# Patient Record
Sex: FEMALE | Race: WHITE | NOT HISPANIC OR LATINO
[De-identification: names, ages, dates, MRNs, and addresses within clinical notes are randomized per-mention and may not be internally consistent; named-entity substitution may affect disease eponyms.]

---

## 2019-01-05 ENCOUNTER — TRANSCRIPTION ENCOUNTER (OUTPATIENT)
Age: 54
End: 2019-01-05

## 2019-01-06 ENCOUNTER — RESULT REVIEW (OUTPATIENT)
Age: 54
End: 2019-01-06

## 2019-01-06 ENCOUNTER — INPATIENT (INPATIENT)
Facility: HOSPITAL | Age: 54
LOS: 4 days | Discharge: HOME | DRG: 329 | End: 2019-01-11
Attending: STUDENT IN AN ORGANIZED HEALTH CARE EDUCATION/TRAINING PROGRAM | Admitting: STUDENT IN AN ORGANIZED HEALTH CARE EDUCATION/TRAINING PROGRAM
Payer: COMMERCIAL

## 2019-01-06 VITALS
WEIGHT: 229.94 LBS | TEMPERATURE: 100 F | OXYGEN SATURATION: 97 % | SYSTOLIC BLOOD PRESSURE: 113 MMHG | HEART RATE: 120 BPM | RESPIRATION RATE: 26 BRPM | HEIGHT: 67 IN | DIASTOLIC BLOOD PRESSURE: 69 MMHG

## 2019-01-06 DIAGNOSIS — K56.609 UNSPECIFIED INTESTINAL OBSTRUCTION, UNSPECIFIED AS TO PARTIAL VERSUS COMPLETE OBSTRUCTION: ICD-10-CM

## 2019-01-06 DIAGNOSIS — K46.0 UNSPECIFIED ABDOMINAL HERNIA WITH OBSTRUCTION, WITHOUT GANGRENE: ICD-10-CM

## 2019-01-06 LAB
ALBUMIN SERPL ELPH-MCNC: 4.4 G/DL — SIGNIFICANT CHANGE UP (ref 3.3–5.2)
ALP SERPL-CCNC: 122 U/L — HIGH (ref 40–120)
ALT FLD-CCNC: 10 U/L — SIGNIFICANT CHANGE UP
ANION GAP SERPL CALC-SCNC: 21 MMOL/L — HIGH (ref 5–17)
APTT BLD: 27.7 SEC — SIGNIFICANT CHANGE UP (ref 27.5–36.3)
AST SERPL-CCNC: 8 U/L — SIGNIFICANT CHANGE UP
BASE EXCESS BLDV CALC-SCNC: -2.2 MMOL/L — LOW (ref -2–2)
BASOPHILS # BLD AUTO: 0 K/UL — SIGNIFICANT CHANGE UP (ref 0–0.2)
BASOPHILS NFR BLD AUTO: 0.1 % — SIGNIFICANT CHANGE UP (ref 0–2)
BILIRUB SERPL-MCNC: 0.7 MG/DL — SIGNIFICANT CHANGE UP (ref 0.4–2)
BUN SERPL-MCNC: 18 MG/DL — SIGNIFICANT CHANGE UP (ref 8–20)
CA-I SERPL-SCNC: 1.09 MMOL/L — LOW (ref 1.15–1.33)
CALCIUM SERPL-MCNC: 9.1 MG/DL — SIGNIFICANT CHANGE UP (ref 8.6–10.2)
CHLORIDE BLDV-SCNC: 99 MMOL/L — SIGNIFICANT CHANGE UP (ref 98–107)
CHLORIDE SERPL-SCNC: 92 MMOL/L — LOW (ref 98–107)
CO2 SERPL-SCNC: 21 MMOL/L — LOW (ref 22–29)
CREAT SERPL-MCNC: 0.66 MG/DL — SIGNIFICANT CHANGE UP (ref 0.5–1.3)
EOSINOPHIL # BLD AUTO: 0 K/UL — SIGNIFICANT CHANGE UP (ref 0–0.5)
EOSINOPHIL NFR BLD AUTO: 0 % — SIGNIFICANT CHANGE UP (ref 0–6)
GAS PNL BLDV: 138 MMOL/L — SIGNIFICANT CHANGE UP (ref 135–145)
GAS PNL BLDV: SIGNIFICANT CHANGE UP
GAS PNL BLDV: SIGNIFICANT CHANGE UP
GLUCOSE BLDV-MCNC: 423 MG/DL — HIGH (ref 70–99)
GLUCOSE SERPL-MCNC: 482 MG/DL — HIGH (ref 70–115)
HCO3 BLDV-SCNC: 22 MMOL/L — SIGNIFICANT CHANGE UP (ref 20–26)
HCT VFR BLD CALC: 43.1 % — SIGNIFICANT CHANGE UP (ref 37–47)
HCT VFR BLDA CALC: 47 — SIGNIFICANT CHANGE UP (ref 39–50)
HGB BLD CALC-MCNC: 15.4 G/DL — SIGNIFICANT CHANGE UP (ref 11.5–15.5)
HGB BLD-MCNC: 14.7 G/DL — SIGNIFICANT CHANGE UP (ref 12–16)
INR BLD: 1.11 RATIO — SIGNIFICANT CHANGE UP (ref 0.88–1.16)
LACTATE BLDV-MCNC: 2.1 MMOL/L — HIGH (ref 0.5–2)
LYMPHOCYTES # BLD AUTO: 0.9 K/UL — LOW (ref 1–4.8)
LYMPHOCYTES # BLD AUTO: 4.2 % — LOW (ref 20–55)
MCHC RBC-ENTMCNC: 31.3 PG — HIGH (ref 27–31)
MCHC RBC-ENTMCNC: 34.1 G/DL — SIGNIFICANT CHANGE UP (ref 32–36)
MCV RBC AUTO: 91.7 FL — SIGNIFICANT CHANGE UP (ref 81–99)
MONOCYTES # BLD AUTO: 1.2 K/UL — HIGH (ref 0–0.8)
MONOCYTES NFR BLD AUTO: 5.5 % — SIGNIFICANT CHANGE UP (ref 3–10)
NEUTROPHILS # BLD AUTO: 19.7 K/UL — HIGH (ref 1.8–8)
NEUTROPHILS NFR BLD AUTO: 89.7 % — HIGH (ref 37–73)
OTHER CELLS CSF MANUAL: 12 ML/DL — LOW (ref 18–22)
PCO2 BLDV: 44 MMHG — SIGNIFICANT CHANGE UP (ref 35–50)
PH BLDV: 7.34 — SIGNIFICANT CHANGE UP (ref 7.32–7.43)
PLATELET # BLD AUTO: 287 K/UL — SIGNIFICANT CHANGE UP (ref 150–400)
PO2 BLDV: 28 MMHG — SIGNIFICANT CHANGE UP (ref 25–45)
POTASSIUM BLDV-SCNC: 4.1 MMOL/L — SIGNIFICANT CHANGE UP (ref 3.4–4.5)
POTASSIUM SERPL-MCNC: 4.3 MMOL/L — SIGNIFICANT CHANGE UP (ref 3.5–5.3)
POTASSIUM SERPL-SCNC: 4.3 MMOL/L — SIGNIFICANT CHANGE UP (ref 3.5–5.3)
PROT SERPL-MCNC: 7.8 G/DL — SIGNIFICANT CHANGE UP (ref 6.6–8.7)
PROTHROM AB SERPL-ACNC: 12.8 SEC — SIGNIFICANT CHANGE UP (ref 10–12.9)
RBC # BLD: 4.7 M/UL — SIGNIFICANT CHANGE UP (ref 4.4–5.2)
RBC # FLD: 12.6 % — SIGNIFICANT CHANGE UP (ref 11–15.6)
SAO2 % BLDV: 59 % — SIGNIFICANT CHANGE UP
SODIUM SERPL-SCNC: 134 MMOL/L — LOW (ref 135–145)
TYPE + AB SCN PNL BLD: SIGNIFICANT CHANGE UP
WBC # BLD: 22 K/UL — HIGH (ref 4.8–10.8)
WBC # FLD AUTO: 22 K/UL — HIGH (ref 4.8–10.8)

## 2019-01-06 PROCEDURE — 44140 PARTIAL REMOVAL OF COLON: CPT

## 2019-01-06 PROCEDURE — 88307 TISSUE EXAM BY PATHOLOGIST: CPT | Mod: 26

## 2019-01-06 PROCEDURE — 99291 CRITICAL CARE FIRST HOUR: CPT

## 2019-01-06 PROCEDURE — 93010 ELECTROCARDIOGRAM REPORT: CPT

## 2019-01-06 PROCEDURE — 49561: CPT | Mod: 59

## 2019-01-06 PROCEDURE — 49568: CPT

## 2019-01-06 PROCEDURE — 71045 X-RAY EXAM CHEST 1 VIEW: CPT | Mod: 26

## 2019-01-06 PROCEDURE — 74177 CT ABD & PELVIS W/CONTRAST: CPT | Mod: 26

## 2019-01-06 PROCEDURE — 93010 ELECTROCARDIOGRAM REPORT: CPT | Mod: 77

## 2019-01-06 PROCEDURE — 71045 X-RAY EXAM CHEST 1 VIEW: CPT | Mod: 26,77

## 2019-01-06 RX ORDER — LISINOPRIL 2.5 MG/1
10 TABLET ORAL DAILY
Qty: 0 | Refills: 0 | Status: DISCONTINUED | OUTPATIENT
Start: 2019-01-06 | End: 2019-01-07

## 2019-01-06 RX ORDER — INSULIN LISPRO 100/ML
VIAL (ML) SUBCUTANEOUS
Qty: 0 | Refills: 0 | Status: DISCONTINUED | OUTPATIENT
Start: 2019-01-06 | End: 2019-01-07

## 2019-01-06 RX ORDER — PIPERACILLIN AND TAZOBACTAM 4; .5 G/20ML; G/20ML
3.38 INJECTION, POWDER, LYOPHILIZED, FOR SOLUTION INTRAVENOUS ONCE
Qty: 0 | Refills: 0 | Status: COMPLETED | OUTPATIENT
Start: 2019-01-06 | End: 2019-01-06

## 2019-01-06 RX ORDER — PIPERACILLIN AND TAZOBACTAM 4; .5 G/20ML; G/20ML
3.38 INJECTION, POWDER, LYOPHILIZED, FOR SOLUTION INTRAVENOUS EVERY 8 HOURS
Qty: 0 | Refills: 0 | Status: COMPLETED | OUTPATIENT
Start: 2019-01-06 | End: 2019-01-09

## 2019-01-06 RX ORDER — ATORVASTATIN CALCIUM 80 MG/1
20 TABLET, FILM COATED ORAL AT BEDTIME
Qty: 0 | Refills: 0 | Status: DISCONTINUED | OUTPATIENT
Start: 2019-01-06 | End: 2019-01-07

## 2019-01-06 RX ORDER — SODIUM CHLORIDE 9 MG/ML
3200 INJECTION INTRAMUSCULAR; INTRAVENOUS; SUBCUTANEOUS ONCE
Qty: 0 | Refills: 0 | Status: COMPLETED | OUTPATIENT
Start: 2019-01-06 | End: 2019-01-06

## 2019-01-06 RX ORDER — DEXTROSE 50 % IN WATER 50 %
12.5 SYRINGE (ML) INTRAVENOUS ONCE
Qty: 0 | Refills: 0 | Status: DISCONTINUED | OUTPATIENT
Start: 2019-01-06 | End: 2019-01-11

## 2019-01-06 RX ORDER — GLUCAGON INJECTION, SOLUTION 0.5 MG/.1ML
1 INJECTION, SOLUTION SUBCUTANEOUS ONCE
Qty: 0 | Refills: 0 | Status: DISCONTINUED | OUTPATIENT
Start: 2019-01-06 | End: 2019-01-11

## 2019-01-06 RX ORDER — MORPHINE SULFATE 50 MG/1
4 CAPSULE, EXTENDED RELEASE ORAL ONCE
Qty: 0 | Refills: 0 | Status: DISCONTINUED | OUTPATIENT
Start: 2019-01-06 | End: 2019-01-06

## 2019-01-06 RX ORDER — SODIUM CHLORIDE 9 MG/ML
1000 INJECTION, SOLUTION INTRAVENOUS
Qty: 0 | Refills: 0 | Status: DISCONTINUED | OUTPATIENT
Start: 2019-01-06 | End: 2019-01-08

## 2019-01-06 RX ORDER — HYDROCHLOROTHIAZIDE 25 MG
12.5 TABLET ORAL DAILY
Qty: 0 | Refills: 0 | Status: DISCONTINUED | OUTPATIENT
Start: 2019-01-06 | End: 2019-01-07

## 2019-01-06 RX ORDER — INSULIN HUMAN 100 [IU]/ML
10 INJECTION, SOLUTION SUBCUTANEOUS ONCE
Qty: 0 | Refills: 0 | Status: COMPLETED | OUTPATIENT
Start: 2019-01-06 | End: 2019-01-06

## 2019-01-06 RX ORDER — DEXTROSE 50 % IN WATER 50 %
25 SYRINGE (ML) INTRAVENOUS ONCE
Qty: 0 | Refills: 0 | Status: DISCONTINUED | OUTPATIENT
Start: 2019-01-06 | End: 2019-01-11

## 2019-01-06 RX ORDER — ENOXAPARIN SODIUM 100 MG/ML
40 INJECTION SUBCUTANEOUS DAILY
Qty: 0 | Refills: 0 | Status: DISCONTINUED | OUTPATIENT
Start: 2019-01-06 | End: 2019-01-11

## 2019-01-06 RX ORDER — DEXTROSE 50 % IN WATER 50 %
15 SYRINGE (ML) INTRAVENOUS ONCE
Qty: 0 | Refills: 0 | Status: DISCONTINUED | OUTPATIENT
Start: 2019-01-06 | End: 2019-01-11

## 2019-01-06 RX ORDER — ACETAMINOPHEN 500 MG
650 TABLET ORAL ONCE
Qty: 0 | Refills: 0 | Status: COMPLETED | OUTPATIENT
Start: 2019-01-06 | End: 2019-01-06

## 2019-01-06 RX ORDER — SODIUM CHLORIDE 9 MG/ML
1000 INJECTION, SOLUTION INTRAVENOUS
Qty: 0 | Refills: 0 | Status: DISCONTINUED | OUTPATIENT
Start: 2019-01-06 | End: 2019-01-11

## 2019-01-06 RX ORDER — ONDANSETRON 8 MG/1
4 TABLET, FILM COATED ORAL ONCE
Qty: 0 | Refills: 0 | Status: COMPLETED | OUTPATIENT
Start: 2019-01-06 | End: 2019-01-06

## 2019-01-06 RX ADMIN — MORPHINE SULFATE 4 MILLIGRAM(S): 50 CAPSULE, EXTENDED RELEASE ORAL at 14:43

## 2019-01-06 RX ADMIN — SODIUM CHLORIDE 125 MILLILITER(S): 9 INJECTION, SOLUTION INTRAVENOUS at 19:35

## 2019-01-06 RX ADMIN — Medication 650 MILLIGRAM(S): at 10:43

## 2019-01-06 RX ADMIN — MORPHINE SULFATE 4 MILLIGRAM(S): 50 CAPSULE, EXTENDED RELEASE ORAL at 11:57

## 2019-01-06 RX ADMIN — Medication 650 MILLIGRAM(S): at 12:45

## 2019-01-06 RX ADMIN — SODIUM CHLORIDE 3200 MILLILITER(S): 9 INJECTION INTRAMUSCULAR; INTRAVENOUS; SUBCUTANEOUS at 10:26

## 2019-01-06 RX ADMIN — ONDANSETRON 4 MILLIGRAM(S): 8 TABLET, FILM COATED ORAL at 10:27

## 2019-01-06 RX ADMIN — SODIUM CHLORIDE 3200 MILLILITER(S): 9 INJECTION INTRAMUSCULAR; INTRAVENOUS; SUBCUTANEOUS at 11:54

## 2019-01-06 RX ADMIN — MORPHINE SULFATE 4 MILLIGRAM(S): 50 CAPSULE, EXTENDED RELEASE ORAL at 10:27

## 2019-01-06 RX ADMIN — PIPERACILLIN AND TAZOBACTAM 200 GRAM(S): 4; .5 INJECTION, POWDER, LYOPHILIZED, FOR SOLUTION INTRAVENOUS at 10:27

## 2019-01-06 RX ADMIN — PIPERACILLIN AND TAZOBACTAM 3.38 GRAM(S): 4; .5 INJECTION, POWDER, LYOPHILIZED, FOR SOLUTION INTRAVENOUS at 11:54

## 2019-01-06 RX ADMIN — INSULIN HUMAN 10 UNIT(S): 100 INJECTION, SOLUTION SUBCUTANEOUS at 11:57

## 2019-01-06 RX ADMIN — MORPHINE SULFATE 4 MILLIGRAM(S): 50 CAPSULE, EXTENDED RELEASE ORAL at 11:45

## 2019-01-06 NOTE — ED PROVIDER NOTE - PHYSICAL EXAMINATION
Const: Awake, alert and oriented. Appears uncomfortable. Smells heavily of cigarettes.  HEENT: NC/AT. Dry mucous membranes.  Eyes: No scleral icterus. EOMI.  Neck:. Soft and supple. Full ROM without pain.  Cardiac: Tachycardic rate and regular rhythm. +S1/S2. No murmurs. Peripheral pulses 2+ and symmetric. No LE edema.  Resp: Speaking in full sentences. No evidence of respiratory distress. No wheezes, rales or rhonchi.  Abd: Soft, large 15 cm midline abdominal distension which is firm, extremely tender, absent bowel sounds within hernia, and non-reducible. Decreased bowel sounds in all other quadrants.   Back: Spine midline and non-tender. No CVAT.  Skin: No rashes, abrasions or lacerations.  Neuro: Awake, alert & oriented x 3. Moves all extremities symmetrically.

## 2019-01-06 NOTE — ED ADULT NURSE REASSESSMENT NOTE - NS ED NURSE REASSESS COMMENT FT1
Assumed care of patient at 1930, alert and oriented x4, resting in bed. IVS patient. Family at bedside, in NAD distress at this time.  NG tube to right nare to low wall suction noted. Chan catheter to straight drainage draining clear mehdi urine noted. Pt currently awaiting OR. Awaiting RN from OR to call for Report. pt educated on plan of care, pt able to successfully teach back plan of care to RN, RN will continue to reeducate pt during hospital stay.

## 2019-01-06 NOTE — H&P ADULT - NSHPPHYSICALEXAM_GEN_ALL_CORE
Constitutional: Patient resting comfortably in bed in no acute distress   HEENT: NG tube in place with 100cc gastric contents   Neck: No JVD, full ROM w/o pain   Respiratory: CTAB with unlabored respirations, no accessory muscle use or conversational dyspnea   Cardiovascular: Regular rate and rhythm with no arrythmias or murmurs  Gastrointestinal: No surgical incisions on inspection, large non reducible abdominal wall hernia likely umbilical in origin with no overlying skin changes but tender to palpation and firm to touch otherwise rest of abdomen soft, non-tender in other quadrants, non-distended with no rebound tenderness or guarding   : Chan in place with 300 mehdi colored in present   Neurological: GCS 15 ([id number]) with no gross sensory, motor or coordination deficits   Psychiatric: Normal mood and affect   Musculoskeletal: No joint pain, swelling or deformity with no limitation of movement

## 2019-01-06 NOTE — ED ADULT NURSE NOTE - OBJECTIVE STATEMENT
pt stated she has had an abd hernia and 10/10 she has been coughing which has made it worse.  pt also hasn't had a bm since friday. pt is febrile

## 2019-01-06 NOTE — ED PROVIDER NOTE - NS ED ROS FT
Const: Denies fever, chills  HEENT: Denies blurry vision, sore throat  Neck: Denies neck pain/stiffness  Resp: + coughing. Denies SOB  Cardiovascular: Denies CP, palpitations, LE edema  GI: + nausea, + vomiting, + abdominal pain, + constipation. Denies diarrhea, blood in stool  : Denies urinary frequency/urgency/dysuria, hematuria  MSK: Denies back pain  Neuro: Denies HA, dizziness, numbness, weakness  Skin: Denies rashes.

## 2019-01-06 NOTE — ED PROVIDER NOTE - MEDICAL DECISION MAKING DETAILS
Patient presents complaining of worsening abdominal pain, nausea, vomiting and now inability to pass stool or flatus with large, non-reducible, very tender midline abdominal hernia. No history of abdominal surgeries. I attempted to reduce hernia, but patient could not tolerate. Code Sepsis called, patient treated with 30 cc/kg fluid bolus, zosyn to cover intra-abdominal infections, tylenol for fever and will CT A/P to evaluate obstruction. Morphine given for pain and zofran for nausea.

## 2019-01-06 NOTE — ED PROVIDER NOTE - PROGRESS NOTE DETAILS
I shared results with patient and family who is at bedside. Patient is taking small sips of water and has not vomited since she has been here. She is still not passing gas. I explained plan for NG tube for decompression. She continues to complain of pain. Will give another 4 mg of morphine. Surgery to see.

## 2019-01-06 NOTE — ED PROVIDER NOTE - OBJECTIVE STATEMENT
Patient with no significant PMH presents complaining of severe abdominal pain, nausea, vomiting and inability to pass flatus for the past 4 days which started after coughing. She has had a large abdominal hernia since her last pregnancy many years ago, but 4 days ago she coughed and the hernia suddenly became enlarged and hard. She states at that time she became nauseas and had multiple episodes of vomiting afterwards. She has had some bowel movements but notes they are not normal in that they have been small and very soft, and today she was unable to pass stool or flatus. She has not taken any medication for pain. She denies fevers at home, chest pain, sore throat, SOB, urinary symptoms. She has been experiencing a dry cough. She denies any history of abdominal surgeries. Patient with no significant PMH presents complaining of severe abdominal pain, nausea, vomiting and inability to pass flatus for the past 4 days which started after coughing. She has had a large abdominal hernia since her last pregnancy many years ago, but 4 days ago she coughed and the hernia suddenly became enlarged and hard. She states at that time she became nauseas and had multiple episodes of vomiting afterwards. She has had some bowel movements but notes they are not normal in that they have been small and very soft, and today she was unable to pass stool or flatus. She has not taken any medication for pain. She denies fevers at home, chest pain, sore throat, SOB, urinary symptoms. She has been experiencing a dry cough. She denies any history of abdominal surgeries. She has not taken any of her medications in the past 4 days due to nausea and vomiting.  PMD: [place]

## 2019-01-06 NOTE — H&P ADULT - HISTORY OF PRESENT ILLNESS
53 year old female presenting with abdominal pain x 4 days. ACS/Trauma consulted to evaluate patient's hernia. Patient seen and examined at bedside with on call surgery attending. Per patient report, she has had this hernia for the past 20 years and has always been asymptomatic (thus had no plans for any operative repair) was reducible when it was smaller but has not been reducible for the past couple of years. Also states that the hernia has always been soft on palpation but she noticed contents getting hard/firm 4 days ago. Describes abdominal pain as located in the hernia as dull, intermittent non radiating pain that progressively got worse prompting her to come to the ED 1/6/19. Abdominal pain was associated with 2 episodes of bilious emesis over past 4 days. Poor PO intake secondary to nausea, vomiting and poor appetite (last meal Friday) Continues to have small BMs (last stool yesterday 1/5) and denies passing much flatus. Denies any surgical history and suspects that her hernia was secondary to diastasis rectus in the past versus umbilical hernia that worsened when she was pregnant.     PMHx: DM, HLD, HTN, TIAs in the past   PSHx: None   Allergies: Biaxin   Meds: Atorvastatin 20mg, Lisinopril-HCTZ 10/12.5mg, metformin 500mg BID, glipizide ER 10mg, gemfibrozil 600mg   FamHx: Non contributory   SocHx: Current smoker (1pack/day x >20yrs) denies EtOH and illicit drug use 53 year old female presenting with abdominal pain x 4 days. ACS/Trauma consulted to evaluate patient's hernia. Patient seen and examined at bedside with on call surgery attending. Per patient report, she has had this hernia for the past 20 years and has always been asymptomatic (thus had no plans for any operative repair) was reducible when it was smaller but has not been reducible for the past couple of years. Also states that the hernia has always been soft on palpation but she noticed contents getting hard/firm 4 days ago.     Describes abdominal pain as located in the hernia as dull, intermittent non radiating pain that progressively got worse prompting her to come to the ED 1/6/19. Abdominal pain was associated with 2 episodes of bilious emesis over past 4 days. Poor PO intake secondary to nausea, vomiting and poor appetite (last meal Friday) Continues to have small BMs (last stool yesterday 1/5) and denies passing much flatus. Denies any surgical history and suspects that her hernia was secondary to diastasis rectus in the past versus umbilical hernia that worsened when she was pregnant.     PMHx: DM, HLD, HTN, TIAs in the past   PSHx: None   Allergies: Biaxin   Meds: Atorvastatin 20mg, Lisinopril-HCTZ 10/12.5mg, metformin 500mg BID, glipizide ER 10mg, gemfibrozil 600mg   FamHx: Non contributory   SocHx: Current smoker (1pack/day x >20yrs) denies EtOH and illicit drug use

## 2019-01-06 NOTE — H&P ADULT - ASSESSMENT
53 year old female with incarcerated ventral wall hernia requiring operative intervention by ACS/Trauma team.

## 2019-01-06 NOTE — H&P ADULT - PROBLEM SELECTOR PLAN 1
1. Admit to ACS/Trauma under Dr. Collins   2. NPO, IVFs and IV ABx (zosyn) started   3. Pre-op labs ordered 1. Admit to ACS/Trauma under Dr. Collins   2. NPO, IVFs and IV ABx (zosyn) started   3. Pre-op labs ordered (type and screen, serum pregnancy test etc)   4. OR 1/6/18  5. Restart home medications 1. Admit to ACS/Trauma under Dr. Collins   2. NPO, IVFs and IV ABx (zosyn) started   3. Pre-op labs ordered (type and screen, serum pregnancy test etc)   4. Restart home medications post operatively   5. Routine DVT PPx

## 2019-01-07 LAB
ANION GAP SERPL CALC-SCNC: 10 MMOL/L — SIGNIFICANT CHANGE UP (ref 5–17)
BASOPHILS # BLD AUTO: 0 K/UL — SIGNIFICANT CHANGE UP (ref 0–0.2)
BASOPHILS NFR BLD AUTO: 0.1 % — SIGNIFICANT CHANGE UP (ref 0–2)
BUN SERPL-MCNC: 20 MG/DL — SIGNIFICANT CHANGE UP (ref 8–20)
CALCIUM SERPL-MCNC: 7.7 MG/DL — LOW (ref 8.6–10.2)
CHLORIDE SERPL-SCNC: 102 MMOL/L — SIGNIFICANT CHANGE UP (ref 98–107)
CO2 SERPL-SCNC: 23 MMOL/L — SIGNIFICANT CHANGE UP (ref 22–29)
CREAT SERPL-MCNC: 0.63 MG/DL — SIGNIFICANT CHANGE UP (ref 0.5–1.3)
EOSINOPHIL # BLD AUTO: 0 K/UL — SIGNIFICANT CHANGE UP (ref 0–0.5)
EOSINOPHIL NFR BLD AUTO: 0.1 % — SIGNIFICANT CHANGE UP (ref 0–6)
GAS PNL BLDA: SIGNIFICANT CHANGE UP
GAS PNL BLDA: SIGNIFICANT CHANGE UP
GLUCOSE BLDC GLUCOMTR-MCNC: 126 MG/DL — HIGH (ref 70–99)
GLUCOSE BLDC GLUCOMTR-MCNC: 156 MG/DL — HIGH (ref 70–99)
GLUCOSE BLDC GLUCOMTR-MCNC: 181 MG/DL — HIGH (ref 70–99)
GLUCOSE BLDC GLUCOMTR-MCNC: 183 MG/DL — HIGH (ref 70–99)
GLUCOSE BLDC GLUCOMTR-MCNC: 197 MG/DL — HIGH (ref 70–99)
GLUCOSE BLDC GLUCOMTR-MCNC: 207 MG/DL — HIGH (ref 70–99)
GLUCOSE BLDC GLUCOMTR-MCNC: 219 MG/DL — HIGH (ref 70–99)
GLUCOSE BLDC GLUCOMTR-MCNC: 235 MG/DL — HIGH (ref 70–99)
GLUCOSE BLDC GLUCOMTR-MCNC: 275 MG/DL — HIGH (ref 70–99)
GLUCOSE BLDC GLUCOMTR-MCNC: 284 MG/DL — HIGH (ref 70–99)
GLUCOSE BLDC GLUCOMTR-MCNC: 298 MG/DL — HIGH (ref 70–99)
GLUCOSE BLDC GLUCOMTR-MCNC: 299 MG/DL — HIGH (ref 70–99)
GLUCOSE BLDC GLUCOMTR-MCNC: 325 MG/DL — HIGH (ref 70–99)
GLUCOSE BLDC GLUCOMTR-MCNC: 361 MG/DL — HIGH (ref 70–99)
GLUCOSE SERPL-MCNC: 318 MG/DL — HIGH (ref 70–115)
HCT VFR BLD CALC: 36.5 % — LOW (ref 37–47)
HGB BLD-MCNC: 12.2 G/DL — SIGNIFICANT CHANGE UP (ref 12–16)
LYMPHOCYTES # BLD AUTO: 1.1 K/UL — SIGNIFICANT CHANGE UP (ref 1–4.8)
LYMPHOCYTES # BLD AUTO: 7.1 % — LOW (ref 20–55)
MAGNESIUM SERPL-MCNC: 2.1 MG/DL — SIGNIFICANT CHANGE UP (ref 1.6–2.6)
MCHC RBC-ENTMCNC: 30.8 PG — SIGNIFICANT CHANGE UP (ref 27–31)
MCHC RBC-ENTMCNC: 33.4 G/DL — SIGNIFICANT CHANGE UP (ref 32–36)
MCV RBC AUTO: 92.2 FL — SIGNIFICANT CHANGE UP (ref 81–99)
MONOCYTES # BLD AUTO: 0.8 K/UL — SIGNIFICANT CHANGE UP (ref 0–0.8)
MONOCYTES NFR BLD AUTO: 5.3 % — SIGNIFICANT CHANGE UP (ref 3–10)
NEUTROPHILS # BLD AUTO: 13.8 K/UL — HIGH (ref 1.8–8)
NEUTROPHILS NFR BLD AUTO: 87 % — HIGH (ref 37–73)
PHOSPHATE SERPL-MCNC: 3.1 MG/DL — SIGNIFICANT CHANGE UP (ref 2.4–4.7)
PLATELET # BLD AUTO: 217 K/UL — SIGNIFICANT CHANGE UP (ref 150–400)
POTASSIUM SERPL-MCNC: 4 MMOL/L — SIGNIFICANT CHANGE UP (ref 3.5–5.3)
POTASSIUM SERPL-SCNC: 4 MMOL/L — SIGNIFICANT CHANGE UP (ref 3.5–5.3)
RBC # BLD: 3.96 M/UL — LOW (ref 4.4–5.2)
RBC # FLD: 12.7 % — SIGNIFICANT CHANGE UP (ref 11–15.6)
SODIUM SERPL-SCNC: 135 MMOL/L — SIGNIFICANT CHANGE UP (ref 135–145)
WBC # BLD: 15.9 K/UL — HIGH (ref 4.8–10.8)
WBC # FLD AUTO: 15.9 K/UL — HIGH (ref 4.8–10.8)

## 2019-01-07 PROCEDURE — 71045 X-RAY EXAM CHEST 1 VIEW: CPT | Mod: 26

## 2019-01-07 PROCEDURE — 74018 RADEX ABDOMEN 1 VIEW: CPT | Mod: 26

## 2019-01-07 RX ORDER — METOCLOPRAMIDE HCL 10 MG
10 TABLET ORAL ONCE
Qty: 0 | Refills: 0 | Status: COMPLETED | OUTPATIENT
Start: 2019-01-07 | End: 2019-01-07

## 2019-01-07 RX ORDER — ACETAMINOPHEN 500 MG
1000 TABLET ORAL ONCE
Qty: 0 | Refills: 0 | Status: COMPLETED | OUTPATIENT
Start: 2019-01-07 | End: 2019-01-07

## 2019-01-07 RX ORDER — INSULIN LISPRO 100/ML
VIAL (ML) SUBCUTANEOUS
Qty: 0 | Refills: 0 | Status: DISCONTINUED | OUTPATIENT
Start: 2019-01-07 | End: 2019-01-07

## 2019-01-07 RX ORDER — INSULIN LISPRO 100/ML
VIAL (ML) SUBCUTANEOUS EVERY 6 HOURS
Qty: 0 | Refills: 0 | Status: DISCONTINUED | OUTPATIENT
Start: 2019-01-07 | End: 2019-01-08

## 2019-01-07 RX ORDER — PROPOFOL 10 MG/ML
5 INJECTION, EMULSION INTRAVENOUS
Qty: 1000 | Refills: 0 | Status: DISCONTINUED | OUTPATIENT
Start: 2019-01-07 | End: 2019-01-07

## 2019-01-07 RX ORDER — PROPOFOL 10 MG/ML
10 INJECTION, EMULSION INTRAVENOUS ONCE
Qty: 0 | Refills: 0 | Status: COMPLETED | OUTPATIENT
Start: 2019-01-07 | End: 2019-01-07

## 2019-01-07 RX ORDER — INSULIN HUMAN 100 [IU]/ML
4 INJECTION, SOLUTION SUBCUTANEOUS
Qty: 50 | Refills: 0 | Status: DISCONTINUED | OUTPATIENT
Start: 2019-01-07 | End: 2019-01-07

## 2019-01-07 RX ORDER — FENTANYL CITRATE 50 UG/ML
50 INJECTION INTRAVENOUS ONCE
Qty: 0 | Refills: 0 | Status: DISCONTINUED | OUTPATIENT
Start: 2019-01-07 | End: 2019-01-08

## 2019-01-07 RX ORDER — ONDANSETRON 8 MG/1
4 TABLET, FILM COATED ORAL ONCE
Qty: 0 | Refills: 0 | Status: COMPLETED | OUTPATIENT
Start: 2019-01-07 | End: 2019-01-07

## 2019-01-07 RX ORDER — FENTANYL CITRATE 50 UG/ML
0.5 INJECTION INTRAVENOUS
Qty: 2500 | Refills: 0 | Status: DISCONTINUED | OUTPATIENT
Start: 2019-01-07 | End: 2019-01-07

## 2019-01-07 RX ORDER — PROPOFOL 10 MG/ML
25 INJECTION, EMULSION INTRAVENOUS
Qty: 1000 | Refills: 0 | Status: DISCONTINUED | OUTPATIENT
Start: 2019-01-07 | End: 2019-01-07

## 2019-01-07 RX ORDER — SODIUM CHLORIDE 9 MG/ML
1000 INJECTION, SOLUTION INTRAVENOUS ONCE
Qty: 0 | Refills: 0 | Status: COMPLETED | OUTPATIENT
Start: 2019-01-07 | End: 2019-01-07

## 2019-01-07 RX ADMIN — INSULIN HUMAN 4 UNIT(S)/HR: 100 INJECTION, SOLUTION SUBCUTANEOUS at 02:29

## 2019-01-07 RX ADMIN — PROPOFOL 3.13 MICROGRAM(S)/KG/MIN: 10 INJECTION, EMULSION INTRAVENOUS at 07:05

## 2019-01-07 RX ADMIN — SODIUM CHLORIDE 125 MILLILITER(S): 9 INJECTION, SOLUTION INTRAVENOUS at 07:07

## 2019-01-07 RX ADMIN — Medication 1000 MILLIGRAM(S): at 17:23

## 2019-01-07 RX ADMIN — Medication 400 MILLIGRAM(S): at 10:50

## 2019-01-07 RX ADMIN — PIPERACILLIN AND TAZOBACTAM 25 GRAM(S): 4; .5 INJECTION, POWDER, LYOPHILIZED, FOR SOLUTION INTRAVENOUS at 22:12

## 2019-01-07 RX ADMIN — PIPERACILLIN AND TAZOBACTAM 25 GRAM(S): 4; .5 INJECTION, POWDER, LYOPHILIZED, FOR SOLUTION INTRAVENOUS at 13:24

## 2019-01-07 RX ADMIN — PIPERACILLIN AND TAZOBACTAM 25 GRAM(S): 4; .5 INJECTION, POWDER, LYOPHILIZED, FOR SOLUTION INTRAVENOUS at 07:06

## 2019-01-07 RX ADMIN — FENTANYL CITRATE 5.21 MICROGRAM(S)/KG/HR: 50 INJECTION INTRAVENOUS at 02:30

## 2019-01-07 RX ADMIN — SODIUM CHLORIDE 1000 MILLILITER(S): 9 INJECTION, SOLUTION INTRAVENOUS at 02:30

## 2019-01-07 RX ADMIN — Medication 10 MILLIGRAM(S): at 12:30

## 2019-01-07 RX ADMIN — ONDANSETRON 4 MILLIGRAM(S): 8 TABLET, FILM COATED ORAL at 10:40

## 2019-01-07 RX ADMIN — ENOXAPARIN SODIUM 40 MILLIGRAM(S): 100 INJECTION SUBCUTANEOUS at 11:26

## 2019-01-07 RX ADMIN — Medication 4: at 11:26

## 2019-01-07 RX ADMIN — Medication 400 MILLIGRAM(S): at 17:22

## 2019-01-07 RX ADMIN — ONDANSETRON 4 MILLIGRAM(S): 8 TABLET, FILM COATED ORAL at 12:00

## 2019-01-07 RX ADMIN — PROPOFOL 3.13 MICROGRAM(S)/KG/MIN: 10 INJECTION, EMULSION INTRAVENOUS at 02:30

## 2019-01-07 RX ADMIN — Medication 1000 MILLIGRAM(S): at 10:51

## 2019-01-07 RX ADMIN — Medication 4: at 17:22

## 2019-01-07 NOTE — BRIEF OPERATIVE NOTE - PROCEDURE
<<-----Click on this checkbox to enter Procedure Ventral hernia repair with mesh  01/07/2019    Active  CSULZBACK  Right hemicolectomy with anastomosis of small intestine to transverse colon  01/07/2019    Active  CSULZBACK  Exploratory laparotomy  01/07/2019    Active  CSULZBACK

## 2019-01-07 NOTE — BRIEF OPERATIVE NOTE - OPERATION/FINDINGS
ischemia of right colon with nonfixation, infarcted omentum which was resected  small bowel viable  right colectomy performed with primary side to side stapled anastomosis  hernia sac excised  subcutaneous skin flaps raised allowing fascia to be brought together in midline  fascia closed primary in midline  biologic onlay mesh placed over fascial closure  two JOSE CARLOS drains placed over mesh exiting in the R and L lower quadrants, RLQ drain runs along inferior aspect of wound and courses along L gutter, LLQ drain runs along inferior aspect of wound and courses along R gutter  skin closed with staples and dry dressing placed, abdominal binder placed in OR

## 2019-01-07 NOTE — PROGRESS NOTE ADULT - ASSESSMENT
A/p: 53yF s/p strangulated ventral hernia, now s/p right hemicolectomy w/ primary ileocolic anastomosis w/ onlay biological mesh    Neuro: Pain adequately controlled, transitioned to oral pain regimen    Card: Continue hemodynamic monitoring    Pulm: Pulmonary toileting out of bed to chair, IS    GI: NPO, will advance as tolerated     : Chan in place, strict i/o's     Endo: ISS, will transition to oral meds     Hem/DVT Lovenox    ID: Zosyn -likely d/c     Lines: PIV    Dispo: SICU A/p: 53yF s/p strangulated ventral hernia, now s/p right hemicolectomy w/ primary ileocolic anastomosis w/ onlay biological mesh    Neuro: Pain adequately controlled, transitioned to oral pain regimen    Card: Continue hemodynamic monitoring    Pulm: Pulmonary toileting out of bed to chair, IS    GI: NPO, will advance as tolerated     : Chan in place, strict i/o's     Endo: ISS, will transition to oral meds     Hem/DVT Lovenox    ID: Zosyn for 4 days    Lines: PIV    Dispo: SICU

## 2019-01-07 NOTE — PROGRESS NOTE ADULT - SUBJECTIVE AND OBJECTIVE BOX
HISTORY  53y Female    24 HOUR EVENTS: Pt doing well this morning, extubated.  Pt states pain is well controlled.  Pt complaining of nausea, given zofran x2. Not passing gas currently, no BM. Pt states nausea is controlled.  Continues on insulin gtt, transitioned to ISS.  JOSE CARLOS's x2 above mesh draining ~50/cc per shift.    SUBJECTIVE/ROS:  [x ] A ten-point review of systems was otherwise negative except as noted.  [ ] Due to altered mental status/intubation, subjective information were not able to be obtained from the patient. History was obtained, to the extent possible, from review of the chart and collateral sources of information.      NEURO  RASS: 0     GCS:  15   CAM ICU: neg  Exam: No focal neuro deficits, RITTER  Meds: acetaminophen  IVPB .. 1000 milliGRAM(s) IV Intermittent once  fentaNYL    Injectable 50 MICROGram(s) IV Push once    [x] Adequacy of sedation and pain control has been assessed and adjusted      RESPIRATORY  RR: 28 (01-07-19 @ 10:00) (18 - 36)  SpO2: 94% (01-07-19 @ 10:00) (89% - 97%)  Wt(kg): --  Exam: unlabored, clear to auscultation bilaterally  Mechanical Ventilation: Mode: CPAP with PS, RR (patient): 29, FiO2: 50, PEEP: 5, PS: 5, MAP: 7.6  ABG - ( 07 Jan 2019 10:01 )  pH: 7.42  /  pCO2: 39    /  pO2: 65    / HCO3: 25    / Base Excess: 0.7   /  SaO2: 95      Lactate: x                [ ] Extubation Readiness Assessed  Meds:       CARDIOVASCULAR  HR: 105 (01-07-19 @ 10:00) (94 - 120)  BP: 127/68 (01-07-19 @ 10:00) (91/52 - 136/63)  BP(mean): 92 (01-07-19 @ 10:00) (66 - 92)  ABP: --  ABP(mean): --  Wt(kg): --  CVP(cm H2O): --  VBG - ( 06 Jan 2019 10:25 )  pH: 7.34  /  pCO2: 44    /  pO2: 28    / HCO3: 22    / Base Excess: -2.2  /  SaO2: 59     Lactate: 2.1                Exam: RRR  Cardiac Rhythm: NSR  Perfusion     [ x]Adequate   [ ]Inadequate  Mentation   [x ]Normal       [ ]Reduced  Extremities  [x ]Warm         [ ]Cool  Volume Status [ ]Hypervolemic [x ]Euvolemic [ ]Hypovolemic  Meds:       GI/NUTRITION  Exam: soft, ttp over incision sites, nondistended, wet to dry in place  Diet: NPO  Meds:     GENITOURINARY  I&O's Detail    01-06 @ 07:01  -  01-07 @ 07:00  --------------------------------------------------------  IN:    fentaNYL Infusion.: 31.8 mL    insulin Infusion: 28 mL    Lactated Ringers IV Bolus: 2000 mL    lactated ringers.: 1125 mL    propofol Infusion: 57.2 mL  Total IN: 3242 mL    OUT:    Bulb: 20 mL    Bulb: 50 mL    Indwelling Catheter - Urethral: 750 mL    Nasoenteral Tube: 300 mL  Total OUT: 1120 mL    Total NET: 2122 mL      01-07 @ 07:01 - 01-07 @ 13:55  --------------------------------------------------------  IN:  Total IN: 0 mL    OUT:    Indwelling Catheter - Urethral: 235 mL  Total OUT: 235 mL    Total NET: -235 mL        Weight (kg): 104.3 (01-06 @ 17:32)  01-07    135  |  102  |  20.0  ----------------------------<  318<H>  4.0   |  23.0  |  0.63    Ca    7.7<L>      07 Jan 2019 02:19  Phos  3.1     01-07  Mg     2.1     01-07    TPro  7.8  /  Alb  4.4  /  TBili  0.7  /  DBili  x   /  AST  8   /  ALT  10  /  AlkPhos  122<H>  01-06    [x ] Chan catheter, indication: critically ill   Meds: dextrose 5%. 1000 milliLiter(s) IV Continuous <Continuous>  lactated ringers. 1000 milliLiter(s) IV Continuous <Continuous>        HEMATOLOGIC  Meds: enoxaparin Injectable 40 milliGRAM(s) SubCutaneous daily    [x] VTE Prophylaxis                        12.2   15.9  )-----------( 217      ( 07 Jan 2019 02:19 )             36.5     PT/INR - ( 06 Jan 2019 10:25 )   PT: 12.8 sec;   INR: 1.11 ratio         PTT - ( 06 Jan 2019 10:25 )  PTT:27.7 sec  Transfusion     [ ] PRBC   [ ] Platelets   [ ] FFP   [ ] Cryoprecipitate      INFECTIOUS DISEASES  T(C): 36.6 (01-07-19 @ 12:00), Max: 37.6 (01-07-19 @ 04:00)  Wt(kg): --  WBC Count: 15.9 K/uL (01-07 @ 02:19)    Recent Cultures:    Meds: piperacillin/tazobactam IVPB. 3.375 Gram(s) IV Intermittent every 8 hours        ENDOCRINE  Capillary Blood Glucose    Meds: dextrose 40% Gel 15 Gram(s) Oral once PRN  dextrose 50% Injectable 12.5 Gram(s) IV Push once  dextrose 50% Injectable 25 Gram(s) IV Push once  dextrose 50% Injectable 25 Gram(s) IV Push once  glucagon  Injectable 1 milliGRAM(s) IntraMuscular once PRN  insulin lispro (HumaLOG) corrective regimen sliding scale   SubCutaneous three times a day before meals        ACCESS DEVICES:  [ ] Peripheral IV  [ ] Central Venous Line	[ ] R	[ ] L	[ ] IJ	[ ] Fem	[ ] SC	Placed:   [ ] Arterial Line		[ ] R	[ ] L	[ ] Fem	[ ] Rad	[ ] Ax	Placed:   [ ] PICC:					[ ] Mediport  [ ] Urinary Catheter, Date Placed:   [ ] Necessity of urinary, arterial, and venous catheters discussed    OTHER MEDICATIONS:      CODE STATUS:     IMAGING:

## 2019-01-08 LAB
ANION GAP SERPL CALC-SCNC: 15 MMOL/L — SIGNIFICANT CHANGE UP (ref 5–17)
BASOPHILS # BLD AUTO: 0 K/UL — SIGNIFICANT CHANGE UP (ref 0–0.2)
BASOPHILS NFR BLD AUTO: 0.1 % — SIGNIFICANT CHANGE UP (ref 0–2)
BUN SERPL-MCNC: 18 MG/DL — SIGNIFICANT CHANGE UP (ref 8–20)
CALCIUM SERPL-MCNC: 8.3 MG/DL — LOW (ref 8.6–10.2)
CHLORIDE SERPL-SCNC: 99 MMOL/L — SIGNIFICANT CHANGE UP (ref 98–107)
CHLORIDE UR-SCNC: <27 MMOL/L — SIGNIFICANT CHANGE UP
CO2 SERPL-SCNC: 33 MMOL/L — HIGH (ref 22–29)
CREAT ?TM UR-MCNC: 77 MG/DL — SIGNIFICANT CHANGE UP
CREAT SERPL-MCNC: 0.52 MG/DL — SIGNIFICANT CHANGE UP (ref 0.5–1.3)
EOSINOPHIL # BLD AUTO: 0 K/UL — SIGNIFICANT CHANGE UP (ref 0–0.5)
EOSINOPHIL NFR BLD AUTO: 0.2 % — SIGNIFICANT CHANGE UP (ref 0–6)
GLUCOSE BLDC GLUCOMTR-MCNC: 168 MG/DL — HIGH (ref 70–99)
GLUCOSE BLDC GLUCOMTR-MCNC: 193 MG/DL — HIGH (ref 70–99)
GLUCOSE BLDC GLUCOMTR-MCNC: 202 MG/DL — HIGH (ref 70–99)
GLUCOSE BLDC GLUCOMTR-MCNC: 204 MG/DL — HIGH (ref 70–99)
GLUCOSE BLDC GLUCOMTR-MCNC: 259 MG/DL — HIGH (ref 70–99)
GLUCOSE BLDC GLUCOMTR-MCNC: 427 MG/DL — HIGH (ref 70–99)
GLUCOSE BLDC GLUCOMTR-MCNC: 446 MG/DL — HIGH (ref 70–99)
GLUCOSE BLDC GLUCOMTR-MCNC: 468 MG/DL — CRITICAL HIGH (ref 70–99)
GLUCOSE SERPL-MCNC: 199 MG/DL — HIGH (ref 70–115)
HBA1C BLD-MCNC: 10.8 % — HIGH (ref 4–5.6)
HCT VFR BLD CALC: 36 % — LOW (ref 37–47)
HGB BLD-MCNC: 11.7 G/DL — LOW (ref 12–16)
LYMPHOCYTES # BLD AUTO: 1.2 K/UL — SIGNIFICANT CHANGE UP (ref 1–4.8)
LYMPHOCYTES # BLD AUTO: 10.9 % — LOW (ref 20–55)
MAGNESIUM SERPL-MCNC: 2.4 MG/DL — SIGNIFICANT CHANGE UP (ref 1.6–2.6)
MCHC RBC-ENTMCNC: 30.4 PG — SIGNIFICANT CHANGE UP (ref 27–31)
MCHC RBC-ENTMCNC: 32.5 G/DL — SIGNIFICANT CHANGE UP (ref 32–36)
MCV RBC AUTO: 93.5 FL — SIGNIFICANT CHANGE UP (ref 81–99)
MONOCYTES # BLD AUTO: 0.6 K/UL — SIGNIFICANT CHANGE UP (ref 0–0.8)
MONOCYTES NFR BLD AUTO: 5.8 % — SIGNIFICANT CHANGE UP (ref 3–10)
NEUTROPHILS # BLD AUTO: 9 K/UL — HIGH (ref 1.8–8)
NEUTROPHILS NFR BLD AUTO: 82.5 % — HIGH (ref 37–73)
PHOSPHATE SERPL-MCNC: 1.9 MG/DL — LOW (ref 2.4–4.7)
PLATELET # BLD AUTO: 236 K/UL — SIGNIFICANT CHANGE UP (ref 150–400)
POTASSIUM SERPL-MCNC: 3.2 MMOL/L — LOW (ref 3.5–5.3)
POTASSIUM SERPL-SCNC: 3.2 MMOL/L — LOW (ref 3.5–5.3)
RBC # BLD: 3.85 M/UL — LOW (ref 4.4–5.2)
RBC # FLD: 12.8 % — SIGNIFICANT CHANGE UP (ref 11–15.6)
SODIUM SERPL-SCNC: 147 MMOL/L — HIGH (ref 135–145)
SODIUM UR-SCNC: 99 MMOL/L — SIGNIFICANT CHANGE UP
WBC # BLD: 11 K/UL — HIGH (ref 4.8–10.8)
WBC # FLD AUTO: 11 K/UL — HIGH (ref 4.8–10.8)

## 2019-01-08 RX ORDER — IPRATROPIUM/ALBUTEROL SULFATE 18-103MCG
3 AEROSOL WITH ADAPTER (GRAM) INHALATION EVERY 6 HOURS
Qty: 0 | Refills: 0 | Status: DISCONTINUED | OUTPATIENT
Start: 2019-01-08 | End: 2019-01-11

## 2019-01-08 RX ORDER — POTASSIUM CHLORIDE 20 MEQ
10 PACKET (EA) ORAL
Qty: 0 | Refills: 0 | Status: COMPLETED | OUTPATIENT
Start: 2019-01-08 | End: 2019-01-08

## 2019-01-08 RX ORDER — POTASSIUM PHOSPHATE, MONOBASIC POTASSIUM PHOSPHATE, DIBASIC 236; 224 MG/ML; MG/ML
30 INJECTION, SOLUTION INTRAVENOUS ONCE
Qty: 0 | Refills: 0 | Status: COMPLETED | OUTPATIENT
Start: 2019-01-08 | End: 2019-01-08

## 2019-01-08 RX ORDER — SODIUM CHLORIDE 9 MG/ML
1000 INJECTION, SOLUTION INTRAVENOUS
Qty: 0 | Refills: 0 | Status: DISCONTINUED | OUTPATIENT
Start: 2019-01-08 | End: 2019-01-09

## 2019-01-08 RX ORDER — IPRATROPIUM BROMIDE 0.2 MG/ML
500 SOLUTION, NON-ORAL INHALATION EVERY 6 HOURS
Qty: 0 | Refills: 0 | Status: DISCONTINUED | OUTPATIENT
Start: 2019-01-08 | End: 2019-01-08

## 2019-01-08 RX ORDER — ALBUTEROL 90 UG/1
2.5 AEROSOL, METERED ORAL EVERY 6 HOURS
Qty: 0 | Refills: 0 | Status: DISCONTINUED | OUTPATIENT
Start: 2019-01-08 | End: 2019-01-08

## 2019-01-08 RX ORDER — SODIUM CHLORIDE 9 MG/ML
1000 INJECTION, SOLUTION INTRAVENOUS ONCE
Qty: 0 | Refills: 0 | Status: COMPLETED | OUTPATIENT
Start: 2019-01-08 | End: 2019-01-08

## 2019-01-08 RX ORDER — SODIUM,POTASSIUM PHOSPHATES 278-250MG
1 POWDER IN PACKET (EA) ORAL EVERY 4 HOURS
Qty: 0 | Refills: 0 | Status: COMPLETED | OUTPATIENT
Start: 2019-01-08 | End: 2019-01-08

## 2019-01-08 RX ORDER — INSULIN LISPRO 100/ML
VIAL (ML) SUBCUTANEOUS
Qty: 0 | Refills: 0 | Status: DISCONTINUED | OUTPATIENT
Start: 2019-01-08 | End: 2019-01-11

## 2019-01-08 RX ORDER — IPRATROPIUM/ALBUTEROL SULFATE 18-103MCG
3 AEROSOL WITH ADAPTER (GRAM) INHALATION EVERY 6 HOURS
Qty: 0 | Refills: 0 | Status: DISCONTINUED | OUTPATIENT
Start: 2019-01-08 | End: 2019-01-08

## 2019-01-08 RX ADMIN — ENOXAPARIN SODIUM 40 MILLIGRAM(S): 100 INJECTION SUBCUTANEOUS at 11:41

## 2019-01-08 RX ADMIN — POTASSIUM PHOSPHATE, MONOBASIC POTASSIUM PHOSPHATE, DIBASIC 83.33 MILLIMOLE(S): 236; 224 INJECTION, SOLUTION INTRAVENOUS at 12:23

## 2019-01-08 RX ADMIN — Medication 3 MILLILITER(S): at 20:14

## 2019-01-08 RX ADMIN — Medication 1 TABLET(S): at 17:10

## 2019-01-08 RX ADMIN — PIPERACILLIN AND TAZOBACTAM 25 GRAM(S): 4; .5 INJECTION, POWDER, LYOPHILIZED, FOR SOLUTION INTRAVENOUS at 15:24

## 2019-01-08 RX ADMIN — Medication 3 MILLILITER(S): at 15:27

## 2019-01-08 RX ADMIN — PIPERACILLIN AND TAZOBACTAM 25 GRAM(S): 4; .5 INJECTION, POWDER, LYOPHILIZED, FOR SOLUTION INTRAVENOUS at 22:14

## 2019-01-08 RX ADMIN — Medication 6: at 00:23

## 2019-01-08 RX ADMIN — SODIUM CHLORIDE 125 MILLILITER(S): 9 INJECTION, SOLUTION INTRAVENOUS at 02:03

## 2019-01-08 RX ADMIN — PIPERACILLIN AND TAZOBACTAM 25 GRAM(S): 4; .5 INJECTION, POWDER, LYOPHILIZED, FOR SOLUTION INTRAVENOUS at 05:12

## 2019-01-08 RX ADMIN — Medication 100 MILLIEQUIVALENT(S): at 11:40

## 2019-01-08 RX ADMIN — Medication 4: at 11:40

## 2019-01-08 RX ADMIN — Medication 8: at 17:10

## 2019-01-08 RX ADMIN — Medication 1 TABLET(S): at 22:26

## 2019-01-08 RX ADMIN — SODIUM CHLORIDE 1000 MILLILITER(S): 9 INJECTION, SOLUTION INTRAVENOUS at 10:26

## 2019-01-08 RX ADMIN — SODIUM CHLORIDE 1000 MILLILITER(S): 9 INJECTION, SOLUTION INTRAVENOUS at 13:11

## 2019-01-08 RX ADMIN — Medication 100 MILLIEQUIVALENT(S): at 10:28

## 2019-01-08 RX ADMIN — Medication 6: at 05:17

## 2019-01-08 RX ADMIN — Medication 4: at 22:26

## 2019-01-08 NOTE — DIETITIAN INITIAL EVALUATION ADULT. - ETIOLOGY
related to altered GI function in setting of s/p strangulated ventral hernia, now s/p right hemicolectomy w/ primary ileocolic anastomosis w/ onlay biological mesh

## 2019-01-08 NOTE — DIETITIAN INITIAL EVALUATION ADULT. - PROBLEM SELECTOR PLAN 1
1. Admit to ACS/Trauma under Dr. Collins   2. NPO, IVFs and IV ABx (zosyn) started   3. Pre-op labs ordered (type and screen, serum pregnancy test etc)   4. Restart home medications post operatively   5. Routine DVT PPx

## 2019-01-08 NOTE — DIETITIAN INITIAL EVALUATION ADULT. - PERTINENT LABORATORY DATA
01-08 Na147 mmol/L<H> Glu 199 mg/dL<H> K+ 3.2 mmol/L<L> Cr  0.52 mg/dL BUN 18.0 mg/dL Phos 1.9 mg/dL<L> Alb n/a   PAB n/a

## 2019-01-08 NOTE — PROGRESS NOTE ADULT - SUBJECTIVE AND OBJECTIVE BOX
HISTORY  53F s/p open right colectomy, primary anastamosis, onlay mesh for strangulated epigastric hernia POD2, nontoxic, HD nl    24 HOUR EVENTS: Extubated yesterday, high NGTOP, looks post pyloric, has bowel function, physiologically well.   tolerated HFNC, clamp trial of NGT started in AM      SUBJECTIVE/ROS:  [x ] A ten-point review of systems was otherwise negative except as noted.  [ ] Due to altered mental status/intubation, subjective information were not able to be obtained from the patient. History was obtained, to the extent possible, from review of the chart and collateral sources of information.      NEURO  RASS:  0   GCS: 15    CAM ICU:  Exam: nonfocal  Meds: fentaNYL    Injectable 50 MICROGram(s) IV Push once    [x] Adequacy of sedation and pain control has been assessed and adjusted      RESPIRATORY  RR: 23 (01-08-19 @ 08:00) (17 - 44)  SpO2: 94% (01-08-19 @ 08:00) (84% - 100%)  Wt(kg): --  Exam: unlabored, clear to auscultation bilaterally  Mechanical Ventilation:   ABG - ( 07 Jan 2019 10:01 )  pH: 7.42  /  pCO2: 39    /  pO2: 65    / HCO3: 25    / Base Excess: 0.7   /  SaO2: 95      Lactate: x                [ ] Extubation Readiness Assessed  Meds:       CARDIOVASCULAR  HR: 103 (01-08-19 @ 08:00) (92 - 128)  BP: 126/67 (01-08-19 @ 08:00) (107/67 - 140/76)  BP(mean): 89 (01-08-19 @ 08:00) (69 - 95)  ABP: --  ABP(mean): --  Wt(kg): --  CVP(cm H2O): --  VBG - ( 06 Jan 2019 10:25 )  pH: 7.34  /  pCO2: 44    /  pO2: 28    / HCO3: 22    / Base Excess: -2.2  /  SaO2: 59     Lactate: 2.1                Exam: RRR  Cardiac Rhythm: RRR  Perfusion     [x ]Adequate   [ ]Inadequate  Mentation   [x ]Normal       [ ]Reduced  Extremities  [x ]Warm         [ ]Cool  Volume Status [ ]Hypervolemic [ ]Euvolemic [ ]Hypovolemic  Meds:       GI/NUTRITION  Exam: soft, ND, NTTP  Diet:  Meds:     GENITOURINARY  I&O's Detail    01-07 @ 07:01 - 01-08 @ 07:00  --------------------------------------------------------  IN:    IV PiggyBack: 200 mL    lactated ringers.: 3000 mL  Total IN: 3200 mL    OUT:    Bulb: 120 mL    Bulb: 40 mL    Indwelling Catheter - Urethral: 1426 mL    Nasoenteral Tube: 4350 mL  Total OUT: 5936 mL    Total NET: -2736 mL      01-08 @ 07:01 - 01-08 @ 10:14  --------------------------------------------------------  IN:    lactated ringers.: 125 mL  Total IN: 125 mL    OUT:    Indwelling Catheter - Urethral: 75 mL  Total OUT: 75 mL    Total NET: 50 mL          01-08    147<H>  |  99  |  18.0  ----------------------------<  199<H>  3.2<L>   |  33.0<H>  |  0.52    Ca    8.3<L>      08 Jan 2019 06:06  Phos  1.9     01-08  Mg     2.4     01-08    TPro  7.8  /  Alb  4.4  /  TBili  0.7  /  DBili  x   /  AST  8   /  ALT  10  /  AlkPhos  122<H>  01-06    [ ] Chan catheter, indication: N/A  Meds: dextrose 5%. 1000 milliLiter(s) IV Continuous <Continuous>  lactated ringers. 1000 milliLiter(s) IV Continuous <Continuous>  multiple electrolytes Injection Type 1 Bolus 1000 milliLiter(s) IV Bolus once  potassium chloride  10 mEq/100 mL IVPB 10 milliEquivalent(s) IV Intermittent every 1 hour  potassium phosphate IVPB 30 milliMole(s) IV Intermittent once        HEMATOLOGIC  Meds: enoxaparin Injectable 40 milliGRAM(s) SubCutaneous daily    [x] VTE Prophylaxis                        11.7   11.0  )-----------( 236      ( 08 Jan 2019 06:06 )             36.0     PT/INR - ( 06 Jan 2019 10:25 )   PT: 12.8 sec;   INR: 1.11 ratio         PTT - ( 06 Jan 2019 10:25 )  PTT:27.7 sec  Transfusion     [ ] PRBC   [ ] Platelets   [ ] FFP   [ ] Cryoprecipitate      INFECTIOUS DISEASES  T(C): 36.9 (01-08-19 @ 08:00), Max: 37.7 (01-07-19 @ 16:26)  Wt(kg): --  WBC Count: 11.0 K/uL (01-08 @ 06:06)    Recent Cultures:    Meds: piperacillin/tazobactam IVPB. 3.375 Gram(s) IV Intermittent every 8 hours        ENDOCRINE  Capillary Blood Glucose    Meds: dextrose 40% Gel 15 Gram(s) Oral once PRN  dextrose 50% Injectable 12.5 Gram(s) IV Push once  dextrose 50% Injectable 25 Gram(s) IV Push once  dextrose 50% Injectable 25 Gram(s) IV Push once  glucagon  Injectable 1 milliGRAM(s) IntraMuscular once PRN  insulin lispro (HumaLOG) corrective regimen sliding scale   SubCutaneous every 6 hours        ACCESS DEVICES:  [X ] Peripheral IV  [ ] Central Venous Line	[ ] R	[ ] L	[ ] IJ	[ ] Fem	[ ] SC	Placed:   [ ] Arterial Line		[ ] R	[ ] L	[ ] Fem	[ ] Rad	[ ] Ax	Placed:   [ ] PICC:					[ ] Mediport  [ ] Urinary Catheter, Date Placed:   [ ] Necessity of urinary, arterial, and venous catheters discussed    OTHER MEDICATIONS:      CODE STATUS:     IMAGING:

## 2019-01-08 NOTE — PROGRESS NOTE ADULT - ASSESSMENT
53F s/p open right colectomy, primary anastamosis, onlay mesh for strangulated epigastric hernia POD2, nontoxic, HD nl with + bowel function    Neuro: Pain adequately controlled, transitioned to oral pain regimen    Card: Continue hemodynamic monitoring    Pulm: Pulmonary toileting out of bed to chair, IS, cont HFNC    GI: NPO, will advance as tolerated NGT clamp trial today    : Byrnes in place, strict i/o's possible DC byrnes    Endo: ISS, will transition to oral meds     Hem/DVT Lovenox    ID: Zosyn for 4 days    Lines: PIV    Dispo: SICU

## 2019-01-08 NOTE — DIETITIAN INITIAL EVALUATION ADULT. - OTHER INFO
Pt s/p strangulated ventral hernia, now s/p right hemicolectomy w/ primary ileocolic anastomosis w/ onlay biological mesh, s/p extubation, complains of Mild nausea, remains NPO at this time. Aware HgBA1c 10.8, pt will benefit from Diabetic education Prior to discharge.

## 2019-01-09 LAB
ALBUMIN SERPL ELPH-MCNC: 2.3 G/DL — LOW (ref 3.3–5.2)
ALP SERPL-CCNC: 89 U/L — SIGNIFICANT CHANGE UP (ref 40–120)
ALT FLD-CCNC: 8 U/L — SIGNIFICANT CHANGE UP
ANION GAP SERPL CALC-SCNC: 10 MMOL/L — SIGNIFICANT CHANGE UP (ref 5–17)
ANION GAP SERPL CALC-SCNC: 13 MMOL/L — SIGNIFICANT CHANGE UP (ref 5–17)
AST SERPL-CCNC: 9 U/L — SIGNIFICANT CHANGE UP
BASOPHILS # BLD AUTO: 0 K/UL — SIGNIFICANT CHANGE UP (ref 0–0.2)
BASOPHILS NFR BLD AUTO: 0.1 % — SIGNIFICANT CHANGE UP (ref 0–2)
BILIRUB SERPL-MCNC: 0.5 MG/DL — SIGNIFICANT CHANGE UP (ref 0.4–2)
BUN SERPL-MCNC: 11 MG/DL — SIGNIFICANT CHANGE UP (ref 8–20)
BUN SERPL-MCNC: 11 MG/DL — SIGNIFICANT CHANGE UP (ref 8–20)
CALCIUM SERPL-MCNC: 7.3 MG/DL — LOW (ref 8.6–10.2)
CALCIUM SERPL-MCNC: 7.7 MG/DL — LOW (ref 8.6–10.2)
CHLORIDE SERPL-SCNC: 96 MMOL/L — LOW (ref 98–107)
CHLORIDE SERPL-SCNC: 97 MMOL/L — LOW (ref 98–107)
CO2 SERPL-SCNC: 30 MMOL/L — HIGH (ref 22–29)
CO2 SERPL-SCNC: 30 MMOL/L — HIGH (ref 22–29)
CREAT SERPL-MCNC: 0.47 MG/DL — LOW (ref 0.5–1.3)
CREAT SERPL-MCNC: 0.49 MG/DL — LOW (ref 0.5–1.3)
EOSINOPHIL # BLD AUTO: 0.1 K/UL — SIGNIFICANT CHANGE UP (ref 0–0.5)
EOSINOPHIL NFR BLD AUTO: 0.9 % — SIGNIFICANT CHANGE UP (ref 0–6)
GLUCOSE BLDC GLUCOMTR-MCNC: 179 MG/DL — HIGH (ref 70–99)
GLUCOSE BLDC GLUCOMTR-MCNC: 180 MG/DL — HIGH (ref 70–99)
GLUCOSE BLDC GLUCOMTR-MCNC: 213 MG/DL — HIGH (ref 70–99)
GLUCOSE BLDC GLUCOMTR-MCNC: 234 MG/DL — HIGH (ref 70–99)
GLUCOSE SERPL-MCNC: 171 MG/DL — HIGH (ref 70–115)
GLUCOSE SERPL-MCNC: 224 MG/DL — HIGH (ref 70–115)
HCT VFR BLD CALC: 30.7 % — LOW (ref 37–47)
HGB BLD-MCNC: 9.7 G/DL — LOW (ref 12–16)
LYMPHOCYTES # BLD AUTO: 1.7 K/UL — SIGNIFICANT CHANGE UP (ref 1–4.8)
LYMPHOCYTES # BLD AUTO: 15.9 % — LOW (ref 20–55)
MAGNESIUM SERPL-MCNC: 2.1 MG/DL — SIGNIFICANT CHANGE UP (ref 1.6–2.6)
MAGNESIUM SERPL-MCNC: 2.2 MG/DL — SIGNIFICANT CHANGE UP (ref 1.6–2.6)
MCHC RBC-ENTMCNC: 30.1 PG — SIGNIFICANT CHANGE UP (ref 27–31)
MCHC RBC-ENTMCNC: 31.6 G/DL — LOW (ref 32–36)
MCV RBC AUTO: 95.3 FL — SIGNIFICANT CHANGE UP (ref 81–99)
MONOCYTES # BLD AUTO: 0.6 K/UL — SIGNIFICANT CHANGE UP (ref 0–0.8)
MONOCYTES NFR BLD AUTO: 6.1 % — SIGNIFICANT CHANGE UP (ref 3–10)
NEUTROPHILS # BLD AUTO: 8.1 K/UL — HIGH (ref 1.8–8)
NEUTROPHILS NFR BLD AUTO: 76.5 % — HIGH (ref 37–73)
PHOSPHATE SERPL-MCNC: 1.6 MG/DL — LOW (ref 2.4–4.7)
PHOSPHATE SERPL-MCNC: 2.4 MG/DL — SIGNIFICANT CHANGE UP (ref 2.4–4.7)
PLATELET # BLD AUTO: 226 K/UL — SIGNIFICANT CHANGE UP (ref 150–400)
POTASSIUM SERPL-MCNC: 2.9 MMOL/L — CRITICAL LOW (ref 3.5–5.3)
POTASSIUM SERPL-MCNC: 3.5 MMOL/L — SIGNIFICANT CHANGE UP (ref 3.5–5.3)
POTASSIUM SERPL-SCNC: 2.9 MMOL/L — CRITICAL LOW (ref 3.5–5.3)
POTASSIUM SERPL-SCNC: 3.5 MMOL/L — SIGNIFICANT CHANGE UP (ref 3.5–5.3)
PROT SERPL-MCNC: 5.5 G/DL — LOW (ref 6.6–8.7)
RBC # BLD: 3.22 M/UL — LOW (ref 4.4–5.2)
RBC # FLD: 12.8 % — SIGNIFICANT CHANGE UP (ref 11–15.6)
SODIUM SERPL-SCNC: 136 MMOL/L — SIGNIFICANT CHANGE UP (ref 135–145)
SODIUM SERPL-SCNC: 140 MMOL/L — SIGNIFICANT CHANGE UP (ref 135–145)
WBC # BLD: 10.6 K/UL — SIGNIFICANT CHANGE UP (ref 4.8–10.8)
WBC # FLD AUTO: 10.6 K/UL — SIGNIFICANT CHANGE UP (ref 4.8–10.8)

## 2019-01-09 RX ORDER — POTASSIUM CHLORIDE 20 MEQ
40 PACKET (EA) ORAL EVERY 4 HOURS
Qty: 0 | Refills: 0 | Status: COMPLETED | OUTPATIENT
Start: 2019-01-09 | End: 2019-01-09

## 2019-01-09 RX ORDER — SODIUM,POTASSIUM PHOSPHATES 278-250MG
1 POWDER IN PACKET (EA) ORAL ONCE
Qty: 0 | Refills: 0 | Status: COMPLETED | OUTPATIENT
Start: 2019-01-09 | End: 2019-01-09

## 2019-01-09 RX ORDER — POTASSIUM CHLORIDE 20 MEQ
10 PACKET (EA) ORAL
Qty: 0 | Refills: 0 | Status: DISCONTINUED | OUTPATIENT
Start: 2019-01-09 | End: 2019-01-09

## 2019-01-09 RX ADMIN — Medication 4: at 11:26

## 2019-01-09 RX ADMIN — Medication 6: at 21:30

## 2019-01-09 RX ADMIN — PIPERACILLIN AND TAZOBACTAM 25 GRAM(S): 4; .5 INJECTION, POWDER, LYOPHILIZED, FOR SOLUTION INTRAVENOUS at 05:19

## 2019-01-09 RX ADMIN — Medication 3 MILLILITER(S): at 03:40

## 2019-01-09 RX ADMIN — Medication 3 MILLILITER(S): at 09:10

## 2019-01-09 RX ADMIN — Medication 4: at 08:10

## 2019-01-09 RX ADMIN — Medication 1 TABLET(S): at 08:28

## 2019-01-09 RX ADMIN — PIPERACILLIN AND TAZOBACTAM 25 GRAM(S): 4; .5 INJECTION, POWDER, LYOPHILIZED, FOR SOLUTION INTRAVENOUS at 21:31

## 2019-01-09 RX ADMIN — Medication 3 MILLILITER(S): at 20:15

## 2019-01-09 RX ADMIN — Medication 40 MILLIEQUIVALENT(S): at 15:07

## 2019-01-09 RX ADMIN — Medication 3 MILLILITER(S): at 15:24

## 2019-01-09 RX ADMIN — ENOXAPARIN SODIUM 40 MILLIGRAM(S): 100 INJECTION SUBCUTANEOUS at 11:26

## 2019-01-09 RX ADMIN — PIPERACILLIN AND TAZOBACTAM 25 GRAM(S): 4; .5 INJECTION, POWDER, LYOPHILIZED, FOR SOLUTION INTRAVENOUS at 15:07

## 2019-01-09 RX ADMIN — Medication 40 MILLIEQUIVALENT(S): at 09:47

## 2019-01-09 RX ADMIN — Medication 6: at 17:05

## 2019-01-09 NOTE — PHYSICAL THERAPY INITIAL EVALUATION ADULT - ADDITIONAL COMMENTS
per patient she does not own or use and AD. She has 3 steps to enter the home with a single hand rail. Pt living with family that will assist as needed. Pt reporting she is independent prior to admission and still works at the bank.

## 2019-01-09 NOTE — PHYSICAL THERAPY INITIAL EVALUATION ADULT - PLANNED THERAPY INTERVENTIONS, PT EVAL
balance training/gait training/stair training/bed mobility training/strengthening/stretching/ROM/transfer training

## 2019-01-09 NOTE — PROGRESS NOTE ADULT - SUBJECTIVE AND OBJECTIVE BOX
INTERVAL HPI/OVERNIGHT EVENTS:    NGT DC'd after tolerating clamp trial.  Started on clears and tolerating.  Duonebs added.  Pt OOB to chair, ambulating, and continues with incentive spirometry.  High flow NC weaned off and placed on NC and tolerating.  Pt admits to 1-2 pack/day smoking history.  Pt continues with bowel function.  Pt remains with high post void residuals s/p byrnes removal with straight cath x 1.      MEDICATIONS  (STANDING):  ALBUTerol/ipratropium for Nebulization 3 milliLiter(s) Nebulizer every 6 hours  dextrose 5%. 1000 milliLiter(s) (50 mL/Hr) IV Continuous <Continuous>  dextrose 50% Injectable 12.5 Gram(s) IV Push once  dextrose 50% Injectable 25 Gram(s) IV Push once  dextrose 50% Injectable 25 Gram(s) IV Push once  enoxaparin Injectable 40 milliGRAM(s) SubCutaneous daily  insulin lispro (HumaLOG) corrective regimen sliding scale   SubCutaneous Before meals and at bedtime  piperacillin/tazobactam IVPB. 3.375 Gram(s) IV Intermittent every 8 hours  potassium chloride    Tablet ER 40 milliEquivalent(s) Oral every 4 hours    MEDICATIONS  (PRN):  dextrose 40% Gel 15 Gram(s) Oral once PRN Blood Glucose LESS THAN 70 milliGRAM(s)/deciliter  glucagon  Injectable 1 milliGRAM(s) IntraMuscular once PRN Glucose LESS THAN 70 milligrams/deciliter      Drug Dosing Weight  Height (cm): 170.18 (06 Jan 2019 17:32)  Weight (kg): 104.3 (06 Jan 2019 17:32)  BMI (kg/m2): 36 (06 Jan 2019 17:32)  BSA (m2): 2.15 (06 Jan 2019 17:32)        PAST MEDICAL & SURGICAL HISTORY:  Hypercholesteremia  TIA (transient ischemic attack)  DM (diabetes mellitus)      ICU Vital Signs Last 24 Hrs  T(C): 36.7 (09 Jan 2019 08:00), Max: 37.5 (09 Jan 2019 00:01)  T(F): 98.1 (09 Jan 2019 08:00), Max: 99.5 (09 Jan 2019 00:01)  HR: 90 (09 Jan 2019 09:11) (89 - 110)  BP: 139/62 (09 Jan 2019 08:00) (109/55 - 155/73)  BP(mean): 89 (09 Jan 2019 08:00) (76 - 101)  ABP: --  ABP(mean): --  RR: 23 (09 Jan 2019 08:00) (18 - 29)  SpO2: 93% (09 Jan 2019 09:11) (75% - 96%)          I&O's Detail    08 Jan 2019 07:01  -  09 Jan 2019 07:00  --------------------------------------------------------  IN:    0.9% NaCl: 1500 mL    IV PiggyBack: 200 mL    lactated ringers.: 625 mL    multiple electrolytes Injection Type 1multiple electrolytes Injection Type 1: 1700 mL  Total IN: 4025 mL    OUT:    Bulb: 90 mL    Bulb: 50 mL    Indwelling Catheter - Urethral: 200 mL    Nasoenteral Tube: 600 mL    Voided: 200 mL  Total OUT: 1140 mL    Total NET: 2885 mL              Physical Exam:    Neurological:  No sensory/motor deficits    HEENT: PERRLA, EOMI, no drainage or redness    Neck: No bruits; no thyromegaly or nodules,  No JVD    Back: Normal spine flexure, No CVA tenderness, No deformity or limitation of movement    Respiratory: Breath Sounds equal & clear to auscultation, no accessory muscle use    Cardiovascular: Regular rate & rhythm, normal S1, S2; no murmurs, gallops or rubs    Gastrointestinal: Soft, non-tender, normal bowel sounds    Extremities: No peripheral edema, No cyanosis, clubbing     Vascular: Equal and normal pulses: 2+ peripheral pulses throughout    Musculoskeletal: No joint pain, swelling or deformity; no limitation of movement    Skin: No rashes    LABS:  CBC Full  -  ( 09 Jan 2019 05:59 )  WBC Count : 10.6 K/uL  Hemoglobin : 9.7 g/dL  Hematocrit : 30.7 %  Platelet Count - Automated : 226 K/uL  Mean Cell Volume : 95.3 fl  Mean Cell Hemoglobin : 30.1 pg  Mean Cell Hemoglobin Concentration : 31.6 g/dL  Auto Neutrophil # : 8.1 K/uL  Auto Lymphocyte # : 1.7 K/uL  Auto Monocyte # : 0.6 K/uL  Auto Eosinophil # : 0.1 K/uL  Auto Basophil # : 0.0 K/uL  Auto Neutrophil % : 76.5 %  Auto Lymphocyte % : 15.9 %  Auto Monocyte % : 6.1 %  Auto Eosinophil % : 0.9 %  Auto Basophil % : 0.1 %    01-09    140  |  97<L>  |  11.0  ----------------------------<  171<H>  2.9<LL>   |  30.0<H>  |  0.49<L>    Ca    7.3<L>      09 Jan 2019 05:59  Phos  2.4     01-09  Mg     2.2     01-09    TPro  5.5<L>  /  Alb  2.3<L>  /  TBili  0.5  /  DBili  x   /  AST  9   /  ALT  8   /  AlkPhos  89  01-09          Assessment and Plan:    Neuro: GCS 15. Monitor for delirium.  Continue to optimize pain control. Serial Neurologic assessments    HEENT: no issues    CV: Continue hemodynamic monitoring    Pulm: Pulmonary toilet.  Continue incentive spirometer.  Chest PT.  Encourage OOB to chair and ambulation     GI/Nutrition: Bowel Regimen    /Renal: monitor UOP. Monitor BMP.  Replete Lytes as needed      HEME- DVT prophylaxis, SCDs    ID:     Lines/Tubes:     Endo:     Skin:     Dispo: INTERVAL HPI/OVERNIGHT EVENTS:    NGT DC'd after tolerating clamp trial.  Started on clears and tolerating.  Duonebs added.  Pt OOB to chair, ambulating, and continues with incentive spirometry.  High flow NC weaned off and placed on NC and tolerating.  Pt admits to 1-2 pack/day smoking history.  Pt continues with bowel function.  Pt remains with high post void residuals s/p byrnes removal with straight cath x 1.      MEDICATIONS  (STANDING):  ALBUTerol/ipratropium for Nebulization 3 milliLiter(s) Nebulizer every 6 hours  dextrose 5%. 1000 milliLiter(s) (50 mL/Hr) IV Continuous <Continuous>  dextrose 50% Injectable 12.5 Gram(s) IV Push once  dextrose 50% Injectable 25 Gram(s) IV Push once  dextrose 50% Injectable 25 Gram(s) IV Push once  enoxaparin Injectable 40 milliGRAM(s) SubCutaneous daily  insulin lispro (HumaLOG) corrective regimen sliding scale   SubCutaneous Before meals and at bedtime  piperacillin/tazobactam IVPB. 3.375 Gram(s) IV Intermittent every 8 hours  potassium chloride    Tablet ER 40 milliEquivalent(s) Oral every 4 hours    MEDICATIONS  (PRN):  dextrose 40% Gel 15 Gram(s) Oral once PRN Blood Glucose LESS THAN 70 milliGRAM(s)/deciliter  glucagon  Injectable 1 milliGRAM(s) IntraMuscular once PRN Glucose LESS THAN 70 milligrams/deciliter      Drug Dosing Weight  Height (cm): 170.18 (06 Jan 2019 17:32)  Weight (kg): 104.3 (06 Jan 2019 17:32)  BMI (kg/m2): 36 (06 Jan 2019 17:32)  BSA (m2): 2.15 (06 Jan 2019 17:32)        PAST MEDICAL & SURGICAL HISTORY:  Hypercholesteremia  TIA (transient ischemic attack)  DM (diabetes mellitus)      ICU Vital Signs Last 24 Hrs  T(C): 36.7 (09 Jan 2019 08:00), Max: 37.5 (09 Jan 2019 00:01)  T(F): 98.1 (09 Jan 2019 08:00), Max: 99.5 (09 Jan 2019 00:01)  HR: 90 (09 Jan 2019 09:11) (89 - 110)  BP: 139/62 (09 Jan 2019 08:00) (109/55 - 155/73)  BP(mean): 89 (09 Jan 2019 08:00) (76 - 101)  ABP: --  ABP(mean): --  RR: 23 (09 Jan 2019 08:00) (18 - 29)  SpO2: 93% (09 Jan 2019 09:11) (75% - 96%)          I&O's Detail    08 Jan 2019 07:01  -  09 Jan 2019 07:00  --------------------------------------------------------  IN:    0.9% NaCl: 1500 mL    IV PiggyBack: 200 mL    lactated ringers.: 625 mL    multiple electrolytes Injection Type 1multiple electrolytes Injection Type 1: 1700 mL  Total IN: 4025 mL    OUT:    Bulb: 90 mL    Bulb: 50 mL    Indwelling Catheter - Urethral: 200 mL    Nasoenteral Tube: 600 mL    Voided: 200 mL  Total OUT: 1140 mL    Total NET: 2885 mL              Physical Exam:    Neurological:  No sensory/motor deficits    HEENT: PERRLA, EOMI, no drainage or redness    Neck: No bruits; no thyromegaly or nodules,  No JVD    Back: Normal spine flexure, No CVA tenderness, No deformity or limitation of movement    Respiratory: Breath Sounds equal & clear to auscultation, no accessory muscle use    Cardiovascular: Regular rate & rhythm, normal S1, S2; no murmurs, gallops or rubs    Gastrointestinal:  Closed midline incision, C/D/I.  soft, non-distended.  Carie-incisional tenderness.  No rigidity.      Extremities: No peripheral edema, No cyanosis, clubbing     Vascular: Equal and normal pulses: 2+ peripheral pulses throughout          LABS:  CBC Full  -  ( 09 Jan 2019 05:59 )  WBC Count : 10.6 K/uL  Hemoglobin : 9.7 g/dL  Hematocrit : 30.7 %  Platelet Count - Automated : 226 K/uL  Mean Cell Volume : 95.3 fl  Mean Cell Hemoglobin : 30.1 pg  Mean Cell Hemoglobin Concentration : 31.6 g/dL  Auto Neutrophil # : 8.1 K/uL  Auto Lymphocyte # : 1.7 K/uL  Auto Monocyte # : 0.6 K/uL  Auto Eosinophil # : 0.1 K/uL  Auto Basophil # : 0.0 K/uL  Auto Neutrophil % : 76.5 %  Auto Lymphocyte % : 15.9 %  Auto Monocyte % : 6.1 %  Auto Eosinophil % : 0.9 %  Auto Basophil % : 0.1 %    01-09    140  |  97<L>  |  11.0  ----------------------------<  171<H>  2.9<LL>   |  30.0<H>  |  0.49<L>    Ca    7.3<L>      09 Jan 2019 05:59  Phos  2.4     01-09  Mg     2.2     01-09    TPro  5.5<L>  /  Alb  2.3<L>  /  TBili  0.5  /  DBili  x   /  AST  9   /  ALT  8   /  AlkPhos  89  01-09          Assessment and Plan:    Neuro: GCS 15. Monitor for delirium.  Continue to optimize pain control. Serial Neurologic assessments    HEENT: no issues    CV: Continue hemodynamic monitoring    Pulm: Pulmonary toilet.  Continue incentive spirometer.  Chest PT.  Encourage OOB to chair and ambulation     GI/Nutrition: Bowel Regimen    /Renal: monitor UOP. Monitor BMP.  Replete Lytes as needed      HEME- DVT prophylaxis, SCDs    ID:     Lines/Tubes:     Endo:     Skin:     Dispo:

## 2019-01-09 NOTE — PROGRESS NOTE ADULT - ASSESSMENT
53F s/p open right colectomy, primary anastomosis onlay mesh for strangulated epigastric hernia POD2, nontoxic, HD nl with + bowel function    Neuro: Pain adequately controlled, transitioned to oral pain regimen    Card: Continue hemodynamic monitoring    Pulm:  Continue OOB to chair and ambulating.  Continue incentive spirometer, Pulmonary toileting    GI:  Tolerating clears, advance to Consistent Carb diet    : Byrnes in place, strict i/o's possible DC byrnes    Endo: ISS.  Consider transitioning back to oral agents if do not foresee future contrast load.      Hem/DVT Lovenox    ID: Zosyn for 4 days    Lines: PIV    Dispo:  Floor transfer today

## 2019-01-09 NOTE — PHYSICAL THERAPY INITIAL EVALUATION ADULT - PERTINENT HX OF CURRENT PROBLEM, REHAB EVAL
Pt with hx of incarnated hernia presents to [facility] with reports of worsening abdominal pain

## 2019-01-09 NOTE — PROGRESS NOTE ADULT - ASSESSMENT
52yo female s/p right hemicolectomy with primary ileocolic anastomosis, primary fascia closure with onlay biologic mesh. Downgraded from the SICU doing well.  -pain control  -OOB and ambulation  -Currrently a TOV  -DVT Prophylaxis

## 2019-01-09 NOTE — PROGRESS NOTE ADULT - SUBJECTIVE AND OBJECTIVE BOX
SICU Downgrade Note    HPI/OVERNIGHT EVENTS: Patient seen and examined at bedside resting comfortably. Pain well controlled, full bowel function,     Denies fever, chills, nausea, vomitting, chest pain, SOB, dizziness, abd pain or any other concerning symptoms    Vital Signs Last 24 Hrs  T(C): 36.8 (09 Jan 2019 16:11), Max: 37.5 (09 Jan 2019 00:01)  T(F): 98.2 (09 Jan 2019 16:11), Max: 99.5 (09 Jan 2019 00:01)  HR: 100 (09 Jan 2019 16:11) (89 - 110)  BP: 113/84 (09 Jan 2019 16:11) (109/55 - 144/67)  BP(mean): 95 (09 Jan 2019 10:00) (76 - 97)  RR: 20 (09 Jan 2019 16:11) (18 - 26)  SpO2: 95% (09 Jan 2019 16:11) (75% - 97%)    I&O's Detail    08 Jan 2019 07:01  -  09 Jan 2019 07:00  --------------------------------------------------------  IN:    0.9% NaCl: 1500 mL    IV PiggyBack: 200 mL    lactated ringers.: 625 mL    multiple electrolytes Injection Type 1multiple electrolytes Injection Type 1: 1700 mL  Total IN: 4025 mL    OUT:    Bulb: 90 mL    Bulb: 50 mL    Indwelling Catheter - Urethral: 200 mL    Nasoenteral Tube: 600 mL    Voided: 200 mL  Total OUT: 1140 mL    Total NET: 2885 mL      09 Jan 2019 07:01  -  09 Jan 2019 16:20  --------------------------------------------------------  IN:  Total IN: 0 mL    OUT:    Voided: 660 mL  Total OUT: 660 mL    Total NET: -660 mL          Constitutional: patient resting comfortably in bed, in no acute distress  HEENT: EOMI / PERRL b/l  Neck: No JVD, full ROM without pain   Respiratory: CTAB, respirations are unlabored, no accessory muscle use, no conversational dyspnea  Cardiovascular: regular rate & rhythm  Gastrointestinal: Abdomen soft, mildly tender on sandee-incisional region, non-distended, no rebound tenderness / guarding  Incision/Wound: Clean, dry and intact  Neurological: GCS: 15 (4/5/6). A&O x 3; no gross sensory / motor / coordination deficits  Musculoskeletal: No joint pain, swelling or deformity; no limitation of movement     LABS:                        9.7    10.6  )-----------( 226      ( 09 Jan 2019 05:59 )             30.7     01-09    140  |  97<L>  |  11.0  ----------------------------<  171<H>  2.9<LL>   |  30.0<H>  |  0.49<L>    Ca    7.3<L>      09 Jan 2019 05:59  Phos  2.4     01-09  Mg     2.2     01-09    TPro  5.5<L>  /  Alb  2.3<L>  /  TBili  0.5  /  DBili  x   /  AST  9   /  ALT  8   /  AlkPhos  89  01-09          MEDICATIONS  (STANDING):  ALBUTerol/ipratropium for Nebulization 3 milliLiter(s) Nebulizer every 6 hours  dextrose 5%. 1000 milliLiter(s) (50 mL/Hr) IV Continuous <Continuous>  dextrose 50% Injectable 12.5 Gram(s) IV Push once  dextrose 50% Injectable 25 Gram(s) IV Push once  dextrose 50% Injectable 25 Gram(s) IV Push once  enoxaparin Injectable 40 milliGRAM(s) SubCutaneous daily  insulin lispro (HumaLOG) corrective regimen sliding scale   SubCutaneous Before meals and at bedtime  piperacillin/tazobactam IVPB. 3.375 Gram(s) IV Intermittent every 8 hours    MEDICATIONS  (PRN):  dextrose 40% Gel 15 Gram(s) Oral once PRN Blood Glucose LESS THAN 70 milliGRAM(s)/deciliter  glucagon  Injectable 1 milliGRAM(s) IntraMuscular once PRN Glucose LESS THAN 70 milligrams/deciliter      MICRO:   Cultures     STUDIES:   EKG, CXR, U/S, CT, MRI

## 2019-01-10 ENCOUNTER — TRANSCRIPTION ENCOUNTER (OUTPATIENT)
Age: 54
End: 2019-01-10

## 2019-01-10 LAB
ALBUMIN SERPL ELPH-MCNC: 2.8 G/DL — LOW (ref 3.3–5.2)
ALP SERPL-CCNC: 103 U/L — SIGNIFICANT CHANGE UP (ref 40–120)
ALT FLD-CCNC: 7 U/L — SIGNIFICANT CHANGE UP
ANION GAP SERPL CALC-SCNC: 11 MMOL/L — SIGNIFICANT CHANGE UP (ref 5–17)
AST SERPL-CCNC: 11 U/L — SIGNIFICANT CHANGE UP
BASOPHILS # BLD AUTO: 0 K/UL — SIGNIFICANT CHANGE UP (ref 0–0.2)
BASOPHILS NFR BLD AUTO: 0.4 % — SIGNIFICANT CHANGE UP (ref 0–2)
BILIRUB SERPL-MCNC: 0.4 MG/DL — SIGNIFICANT CHANGE UP (ref 0.4–2)
BUN SERPL-MCNC: 10 MG/DL — SIGNIFICANT CHANGE UP (ref 8–20)
CALCIUM SERPL-MCNC: 7.6 MG/DL — LOW (ref 8.6–10.2)
CHLORIDE SERPL-SCNC: 99 MMOL/L — SIGNIFICANT CHANGE UP (ref 98–107)
CO2 SERPL-SCNC: 25 MMOL/L — SIGNIFICANT CHANGE UP (ref 22–29)
CREAT SERPL-MCNC: 0.42 MG/DL — LOW (ref 0.5–1.3)
EOSINOPHIL # BLD AUTO: 0.2 K/UL — SIGNIFICANT CHANGE UP (ref 0–0.5)
EOSINOPHIL NFR BLD AUTO: 2.3 % — SIGNIFICANT CHANGE UP (ref 0–6)
GLUCOSE BLDC GLUCOMTR-MCNC: 203 MG/DL — HIGH (ref 70–99)
GLUCOSE BLDC GLUCOMTR-MCNC: 235 MG/DL — HIGH (ref 70–99)
GLUCOSE BLDC GLUCOMTR-MCNC: 309 MG/DL — HIGH (ref 70–99)
GLUCOSE BLDC GLUCOMTR-MCNC: 310 MG/DL — HIGH (ref 70–99)
GLUCOSE SERPL-MCNC: 307 MG/DL — HIGH (ref 70–115)
HCT VFR BLD CALC: 34 % — LOW (ref 37–47)
HGB BLD-MCNC: 10.7 G/DL — LOW (ref 12–16)
LYMPHOCYTES # BLD AUTO: 1.6 K/UL — SIGNIFICANT CHANGE UP (ref 1–4.8)
LYMPHOCYTES # BLD AUTO: 16.6 % — LOW (ref 20–55)
MAGNESIUM SERPL-MCNC: 2.1 MG/DL — SIGNIFICANT CHANGE UP (ref 1.6–2.6)
MCHC RBC-ENTMCNC: 30 PG — SIGNIFICANT CHANGE UP (ref 27–31)
MCHC RBC-ENTMCNC: 31.5 G/DL — LOW (ref 32–36)
MCV RBC AUTO: 95.2 FL — SIGNIFICANT CHANGE UP (ref 81–99)
MONOCYTES # BLD AUTO: 0.8 K/UL — SIGNIFICANT CHANGE UP (ref 0–0.8)
MONOCYTES NFR BLD AUTO: 7.9 % — SIGNIFICANT CHANGE UP (ref 3–10)
NEUTROPHILS # BLD AUTO: 7.1 K/UL — SIGNIFICANT CHANGE UP (ref 1.8–8)
NEUTROPHILS NFR BLD AUTO: 71 % — SIGNIFICANT CHANGE UP (ref 37–73)
PHOSPHATE SERPL-MCNC: 2.6 MG/DL — SIGNIFICANT CHANGE UP (ref 2.4–4.7)
PLATELET # BLD AUTO: 205 K/UL — SIGNIFICANT CHANGE UP (ref 150–400)
POTASSIUM SERPL-MCNC: 3.8 MMOL/L — SIGNIFICANT CHANGE UP (ref 3.5–5.3)
POTASSIUM SERPL-SCNC: 3.8 MMOL/L — SIGNIFICANT CHANGE UP (ref 3.5–5.3)
PROT SERPL-MCNC: 6.1 G/DL — LOW (ref 6.6–8.7)
RBC # BLD: 3.57 M/UL — LOW (ref 4.4–5.2)
RBC # FLD: 12.6 % — SIGNIFICANT CHANGE UP (ref 11–15.6)
SODIUM SERPL-SCNC: 135 MMOL/L — SIGNIFICANT CHANGE UP (ref 135–145)
WBC # BLD: 10 K/UL — SIGNIFICANT CHANGE UP (ref 4.8–10.8)
WBC # FLD AUTO: 10 K/UL — SIGNIFICANT CHANGE UP (ref 4.8–10.8)

## 2019-01-10 PROCEDURE — 99222 1ST HOSP IP/OBS MODERATE 55: CPT

## 2019-01-10 RX ORDER — POTASSIUM PHOSPHATE, MONOBASIC POTASSIUM PHOSPHATE, DIBASIC 236; 224 MG/ML; MG/ML
30 INJECTION, SOLUTION INTRAVENOUS ONCE
Qty: 0 | Refills: 0 | Status: COMPLETED | OUTPATIENT
Start: 2019-01-10 | End: 2019-01-10

## 2019-01-10 RX ORDER — POTASSIUM CHLORIDE 20 MEQ
40 PACKET (EA) ORAL ONCE
Qty: 0 | Refills: 0 | Status: COMPLETED | OUTPATIENT
Start: 2019-01-10 | End: 2019-01-10

## 2019-01-10 RX ORDER — METFORMIN HYDROCHLORIDE 850 MG/1
500 TABLET ORAL
Qty: 0 | Refills: 0 | Status: DISCONTINUED | OUTPATIENT
Start: 2019-01-10 | End: 2019-01-11

## 2019-01-10 RX ORDER — TRAMADOL HYDROCHLORIDE 50 MG/1
0.5 TABLET ORAL
Qty: 30 | Refills: 0
Start: 2019-01-10

## 2019-01-10 RX ORDER — TRAMADOL HYDROCHLORIDE 50 MG/1
25 TABLET ORAL EVERY 4 HOURS
Qty: 0 | Refills: 0 | Status: DISCONTINUED | OUTPATIENT
Start: 2019-01-10 | End: 2019-01-11

## 2019-01-10 RX ORDER — TRAMADOL HYDROCHLORIDE 50 MG/1
50 TABLET ORAL EVERY 4 HOURS
Qty: 0 | Refills: 0 | Status: DISCONTINUED | OUTPATIENT
Start: 2019-01-10 | End: 2019-01-11

## 2019-01-10 RX ADMIN — Medication 3 MILLILITER(S): at 15:36

## 2019-01-10 RX ADMIN — POTASSIUM PHOSPHATE, MONOBASIC POTASSIUM PHOSPHATE, DIBASIC 85 MILLIMOLE(S): 236; 224 INJECTION, SOLUTION INTRAVENOUS at 05:28

## 2019-01-10 RX ADMIN — METFORMIN HYDROCHLORIDE 500 MILLIGRAM(S): 850 TABLET ORAL at 14:35

## 2019-01-10 RX ADMIN — ENOXAPARIN SODIUM 40 MILLIGRAM(S): 100 INJECTION SUBCUTANEOUS at 11:13

## 2019-01-10 RX ADMIN — Medication 6: at 16:33

## 2019-01-10 RX ADMIN — Medication 10: at 07:57

## 2019-01-10 RX ADMIN — Medication 10: at 11:36

## 2019-01-10 RX ADMIN — Medication 6: at 21:15

## 2019-01-10 RX ADMIN — Medication 40 MILLIEQUIVALENT(S): at 11:14

## 2019-01-10 RX ADMIN — Medication 3 MILLILITER(S): at 04:01

## 2019-01-10 NOTE — DISCHARGE NOTE ADULT - CARE PLAN
Principal Discharge DX:	Other complete intestinal obstruction  Goal:	Return to normal bowel function  Assessment and plan of treatment:	BATHING: Please do not submerge wound underwater. You may shower and/or sponge bathe.   ACTIVITY: No heavy lifting or straining. Otherwise, you may return to your usual level of physical activity. If you are taking narcotic pain medication (such as Percocet) DO NOT drive a car, operate machinery or make important decisions.  DIET: Return to your usual diet.  NOTIFY YOUR SURGEON IF: You have any bleeding that does not stop, any pus draining from your wound(s), any fever (over 100.4 F) or chills, persistent nausea/vomiting, persistent diarrhea, or if your pain is not controlled on your discharge pain medications.  FOLLOW-UP: Please follow up with your primary care physician and Acute Care Surgery clinic [phone] in 10-14 days regarding your hospitalization. Call for appointment upon discharge. Principal Discharge DX:	Other complete intestinal obstruction  Goal:	Return to normal bowel function  Assessment and plan of treatment:	BATHING: Please do not submerge wound underwater. You may shower and/or sponge bathe.   ACTIVITY: No heavy lifting or straining. Otherwise, you may return to your usual level of physical activity. If you are taking narcotic pain medication (such as Percocet) DO NOT drive a car, operate machinery or make important decisions.  DIET: Return to your usual diet.  NOTIFY YOUR SURGEON IF: You have any bleeding that does not stop, any pus draining from your wound(s), any fever (over 100.4 F) or chills, persistent nausea/vomiting, persistent diarrhea, or if your pain is not controlled on your discharge pain medications.  FOLLOW-UP: Please follow up with your primary care physician and Acute Care Surgery clinic [phone] in 10-14 days regarding your hospitalization. Call for appointment upon discharge.  Secondary Diagnosis:	DM (diabetes mellitus)  Assessment and plan of treatment:	Endocrinology consulted during your admission, they are recommending that you start taking metformin 3 times a day instead of twice a day, a prescription was sent to the [facility] pharmacy that's located at the entrance of the hospital. They are also recommending to restart your glipizide at the dose that you had originally been taking and follow up with Dr. Calvo in 1-2 weeks of discharge

## 2019-01-10 NOTE — DISCHARGE NOTE ADULT - MEDICATION SUMMARY - MEDICATIONS TO TAKE
I will START or STAY ON the medications listed below when I get home from the hospital:    traMADol 50 mg oral tablet  -- 0.5 tab(s) by mouth every 4 hours, As needed, Moderate Pain (4 - 6) MDD:75 mg  -- Indication: For pain    lisinopril  -- Indication: For Home med    glipiZIDE  -- Indication: For Home med    metFORMIN  -- Indication: For Home med    gemfibrozil  -- Indication: For Home med    atorvastatin  -- Indication: For Home med

## 2019-01-10 NOTE — DISCHARGE NOTE ADULT - CARE PROVIDER_API CALL
[facility],   [address] [facility] [address]  [phone]  Phone: (   )    -  Fax: (   )    - acute care surgery outpatient clinci,   [address] [facility]  1st floor  [facility] [address]  Phone: [phone]  Fax: (   )    -    Tatiana Calvo), EndocrinologyMetabDiabetes  [address], [place] [id number]  Phone: [phone]  Fax: [phone]

## 2019-01-10 NOTE — DISCHARGE NOTE ADULT - PATIENT PORTAL LINK FT
You can access the [facility] Patient Portal, offered by [facility], by registering with the following website: [email or web address]

## 2019-01-10 NOTE — CONSULT NOTE ADULT - SUBJECTIVE AND OBJECTIVE BOX
HPI:  53 year old female presenting with abdominal pain x 4 days. She has had this hernia for the past 20 years and has always been asymptomatic (had no plans for any operative repair) was reducible when it was smaller but has not been reducible for the past couple of years. Also states that the hernia has always been soft on palpation but she noticed contents getting hard/firm 4 days ago. Describes abdominal pain as located in the hernia as dull, intermittent non radiating pain that progressively got worse. Abdominal pain was associated with 2 episodes of bilious emesis over past 4 days. Poor PO intake secondary to nausea, vomiting and poor appetite (last meal Friday) Continues to have small BMs (last stool yesterday 1/5) and denies passing much flatus.      PMHx: DM, HLD, HTN, TIAs in the past   PSHx: None   Allergies: Biaxin   Meds: Atorvastatin 20mg, Lisinopril-HCTZ 10/12.5mg, metformin 500mg BID, glipizide ER 10mg, gemfibrozil 600mg   FamHx: Non contributory   SocHx: Current smoker (1pack/day x >20yrs) denies EtOH and illicit drug use (06 Jan 2019 17:00)      PAST MEDICAL & SURGICAL HISTORY:  Hypercholesteremia  TIA (transient ischemic attack)  DM (diabetes mellitus)    FAMILY HISTORY:    SOCIAL HISTORY:    REVIEW OF SYSTEMS:  Constitutional: No fever, no chills, no change in weight.  Eyes: No eye swelling,no  blurry vision, no redness, no loss of vision.  Neck: No neck pain, no change in voice.  Lungs: No shortness of breath, no wheezing, no cough  CV: No chest pain, no palpitations, no pain with walking.  GI: No nausea, no vomiting, no constipation, no diarrhea, no abdominal pain  : No urinary frequency, no blood in urine, no urinary burning, no difficulty voiding.  Musculoskeletal: No muscle pain, no joint pain, no swelling.  Skin: No rash, no infections.  Neurologic: No headaches, no weakness, no burning or pain in feet  Endocrine: No heat intolerance, no cold intolerance  Psych: No depression    MEDICATIONS  (STANDING):  ALBUTerol/ipratropium for Nebulization 3 milliLiter(s) Nebulizer every 6 hours  dextrose 5%. 1000 milliLiter(s) (50 mL/Hr) IV Continuous <Continuous>  dextrose 50% Injectable 12.5 Gram(s) IV Push once  dextrose 50% Injectable 25 Gram(s) IV Push once  dextrose 50% Injectable 25 Gram(s) IV Push once  enoxaparin Injectable 40 milliGRAM(s) SubCutaneous daily  insulin lispro (HumaLOG) corrective regimen sliding scale   SubCutaneous Before meals and at bedtime  metFORMIN 500 milliGRAM(s) Oral two times a day    MEDICATIONS  (PRN):  dextrose 40% Gel 15 Gram(s) Oral once PRN Blood Glucose LESS THAN 70 milliGRAM(s)/deciliter  glucagon  Injectable 1 milliGRAM(s) IntraMuscular once PRN Glucose LESS THAN 70 milligrams/deciliter  traMADol 25 milliGRAM(s) Oral every 4 hours PRN Moderate Pain (4 - 6)  traMADol 50 milliGRAM(s) Oral every 4 hours PRN Severe Pain (7 - 10)    Allergies  Biaxin (Vomiting)    CAPILLARY BLOOD GLUCOSE  POCT Blood Glucose.: 309 mg/dL (10 Joaquín 2019 11:34)      PHYSICAL EXAM:    Vital Signs Last 24 Hrs  T(C): 36.7 (09 Jan 2019 23:00), Max: 36.8 (09 Jan 2019 16:11)  T(F): 98 (09 Jan 2019 23:00), Max: 98.2 (09 Jan 2019 16:11)  HR: 88 (10 Joaquín 2019 04:05) (88 - 102)  BP: 114/60 (09 Jan 2019 23:00) (113/84 - 121/68)  BP(mean): --  RR: 20 (09 Jan 2019 23:00) (18 - 20)  SpO2: 94% (10 Joaquní 2019 04:05) (92% - 97%)  General appearance: Well developed, well nourished.  Eyes: Pupils equal and reactive to light. EOM full. No exophthalmos.  Neck: Trachea midline.   Lungs: Normal respiratory excursion. Lungs clear.  CV: Regular cardiac rhythm. No murmur. Carotid and pedal pulses intact.  Abdomen: Soft, non tender, no organomegaly or mass.  Musculoskeletal: No cyanosis, clubbing, or edema. No pedal lesions.  Skin: Warm and moist. No rash.   Neuro: Normal motor and sensory function. DTR's normal.  Psych: Normal affect, good judgement.    LABS:                        10.7   10.0  )-----------( 205      ( 10 Joaquín 2019 07:57 )             34.0     01-10    135  |  99  |  10.0  ----------------------------<  307<H>  3.8   |  25.0  |  0.42<L>    Ca    7.6<L>      10 Joaquín 2019 07:57  Phos  2.6     01-10  Mg     2.1     01-10    TPro  6.1<L>  /  Alb  2.8<L>  /  TBili  0.4  /  DBili  x   /  AST  11  /  ALT  7   /  AlkPhos  103  01-10      LIVER FUNCTIONS - ( 10 Joaquín 2019 07:57 )  Alb: 2.8 g/dL / Pro: 6.1 g/dL / ALK PHOS: 103 U/L / ALT: 7 U/L / AST: 11 U/L / GGT: x           Hemoglobin A1C, Whole Blood: 10.8 % (01-08 @ 06:06) HPI:  53 year old female presenting with abdominal pain x 4 days. She has had this hernia for the past 20 years and has always been asymptomatic (had no plans for any operative repair) was reducible when it was smaller but has not been reducible for the past couple of years. Also states that the hernia has always been soft on palpation but she noticed contents getting hard/firm 4 days ago. Describes abdominal pain as located in the hernia as dull, intermittent non radiating pain that progressively got worse. Abdominal pain was associated with 2 episodes of bilious emesis over past 4 days. Poor PO intake secondary to nausea, vomiting and poor appetite (last meal Friday) . She is s/p surgery. She had dm2 for 10 years. SHe takes  mg BID and glizpide daily, not sure dose , 1pill only , she thinks is 5 mg qd. She does not check any Bgs ever.   she has never seen a nutritionist and she eats regular diet, no restrictions.     PMHx: DM, HLD, HTN, TIAs in the past   PSHx: None   Allergies: Biaxin   Meds: Atorvastatin 20mg, Lisinopril-HCTZ 10/12.5mg, metformin 500mg BID, glipizide ER 10mg, gemfibrozil 600mg   FamHx: Non contributory   SocHx: Current smoker (1pack/day x >20yrs) denies EtOH and illicit drug use (06 Jan 2019 17:00)      PAST MEDICAL & SURGICAL HISTORY:  Hypercholesteremia  TIA (transient ischemic attack)  DM (diabetes mellitus)    FAMILY HISTORY:    SOCIAL HISTORY:    REVIEW OF SYSTEMS:  Constitutional: No fever, no chills, no change in weight.  Eyes: No eye swelling,no  blurry vision, no redness, no loss of vision.  Neck: No neck pain, no change in voice.  Lungs: No shortness of breath, no wheezing, no cough  CV: No chest pain, no palpitations, no pain with walking.  GI: No nausea, no vomiting, no constipation, no diarrhea, no abdominal pain  : No urinary frequency, no blood in urine, no urinary burning, no difficulty voiding.  Musculoskeletal: No muscle pain, no joint pain, no swelling.  Skin: No rash, no infections.  Neurologic: No headaches, no weakness, no burning or pain in feet  Endocrine: No heat intolerance, no cold intolerance  Psych: No depression    MEDICATIONS  (STANDING):  ALBUTerol/ipratropium for Nebulization 3 milliLiter(s) Nebulizer every 6 hours  dextrose 5%. 1000 milliLiter(s) (50 mL/Hr) IV Continuous <Continuous>  dextrose 50% Injectable 12.5 Gram(s) IV Push once  dextrose 50% Injectable 25 Gram(s) IV Push once  dextrose 50% Injectable 25 Gram(s) IV Push once  enoxaparin Injectable 40 milliGRAM(s) SubCutaneous daily  insulin lispro (HumaLOG) corrective regimen sliding scale   SubCutaneous Before meals and at bedtime  metFORMIN 500 milliGRAM(s) Oral two times a day    MEDICATIONS  (PRN):  dextrose 40% Gel 15 Gram(s) Oral once PRN Blood Glucose LESS THAN 70 milliGRAM(s)/deciliter  glucagon  Injectable 1 milliGRAM(s) IntraMuscular once PRN Glucose LESS THAN 70 milligrams/deciliter  traMADol 25 milliGRAM(s) Oral every 4 hours PRN Moderate Pain (4 - 6)  traMADol 50 milliGRAM(s) Oral every 4 hours PRN Severe Pain (7 - 10)    Allergies  Biaxin (Vomiting)    CAPILLARY BLOOD GLUCOSE  POCT Blood Glucose.: 309 mg/dL (10 Joaquín 2019 11:34)      PHYSICAL EXAM:    Vital Signs Last 24 Hrs  T(C): 36.7 (09 Jan 2019 23:00), Max: 36.8 (09 Jan 2019 16:11)  T(F): 98 (09 Jan 2019 23:00), Max: 98.2 (09 Jan 2019 16:11)  HR: 88 (10 Joaquín 2019 04:05) (88 - 102)  BP: 114/60 (09 Jan 2019 23:00) (113/84 - 121/68)  BP(mean): --  RR: 20 (09 Jan 2019 23:00) (18 - 20)  SpO2: 94% (10 Joaquín 2019 04:05) (92% - 97%)  General appearance: Well developed, well nourished.  Eyes: Pupils equal and reactive to light. EOM full. No exophthalmos.  Neck: Trachea midline.   Lungs: Normal respiratory excursion. Lungs clear.  CV: Regular cardiac rhythm. No murmur. Carotid and pedal pulses intact.  Abdomen: Soft, non tender, no organomegaly or mass.  Musculoskeletal: No cyanosis, clubbing, or edema. No pedal lesions.  Skin: Warm and moist. No rash.   Neuro: Normal motor and sensory function. DTR's normal.  Psych: Normal affect, good judgement.    LABS:                        10.7   10.0  )-----------( 205      ( 10 Joaquín 2019 07:57 )             34.0     01-10    135  |  99  |  10.0  ----------------------------<  307<H>  3.8   |  25.0  |  0.42<L>    Ca    7.6<L>      10 Joaquín 2019 07:57  Phos  2.6     01-10  Mg     2.1     01-10    TPro  6.1<L>  /  Alb  2.8<L>  /  TBili  0.4  /  DBili  x   /  AST  11  /  ALT  7   /  AlkPhos  103  01-10      LIVER FUNCTIONS - ( 10 Joaquín 2019 07:57 )  Alb: 2.8 g/dL / Pro: 6.1 g/dL / ALK PHOS: 103 U/L / ALT: 7 U/L / AST: 11 U/L / GGT: x           Hemoglobin A1C, Whole Blood: 10.8 % (01-08 @ 06:06)

## 2019-01-10 NOTE — DISCHARGE NOTE ADULT - NS AS ACTIVITY OBS
No Heavy lifting/straining/Showering allowed Do not drive or operate machinery/Walking-Outdoors allowed/No Heavy lifting/straining/Showering allowed/Do not make important decisions/Walking-Indoors allowed/Sex allowed

## 2019-01-10 NOTE — DISCHARGE NOTE ADULT - INSTRUCTIONS
Regular advance diet as tolerated  No heavy lifting, no strenuous exercise, no contact sports  while on narcotics do not drive, make any important decisions, drink alcohol, operate heavy machinery

## 2019-01-10 NOTE — DISCHARGE NOTE ADULT - PLAN OF CARE
Return to normal bowel function BATHING: Please do not submerge wound underwater. You may shower and/or sponge bathe.   ACTIVITY: No heavy lifting or straining. Otherwise, you may return to your usual level of physical activity. If you are taking narcotic pain medication (such as Percocet) DO NOT drive a car, operate machinery or make important decisions.  DIET: Return to your usual diet.  NOTIFY YOUR SURGEON IF: You have any bleeding that does not stop, any pus draining from your wound(s), any fever (over 100.4 F) or chills, persistent nausea/vomiting, persistent diarrhea, or if your pain is not controlled on your discharge pain medications.  FOLLOW-UP: Please follow up with your primary care physician and Acute Care Surgery clinic [phone] in 10-14 days regarding your hospitalization. Call for appointment upon discharge. Endocrinology consulted during your admission, they are recommending that you start taking metformin 3 times a day instead of twice a day, [facility] prescription [facility] that's located at the entrance of the hospital. They are also recommending to restart your glipizide at the dose that you had originally been taking and follow up with Dr. Calvo in 1-2 weeks of discharge

## 2019-01-10 NOTE — PROGRESS NOTE ADULT - SUBJECTIVE AND OBJECTIVE BOX
HPI/OVERNIGHT EVENTS: Patient seen and examined at bedside this AM. No acute events overnight per nursing reports. Voiding, full bowel function, pain well controlled. Denies fever, chills, nausea, vomitting, chest pain, SOB, dizziness, abd pain or any other concerning symptoms    Vital Signs Last 24 Hrs  T(C): 36.7 (09 Jan 2019 23:00), Max: 36.8 (09 Jan 2019 12:00)  T(F): 98 (09 Jan 2019 23:00), Max: 98.3 (09 Jan 2019 12:00)  HR: 88 (10 Joaquín 2019 04:05) (88 - 102)  BP: 114/60 (09 Jan 2019 23:00) (113/84 - 140/71)  BP(mean): 95 (09 Jan 2019 10:00) (95 - 95)  RR: 20 (09 Jan 2019 23:00) (18 - 24)  SpO2: 94% (10 Joaquín 2019 04:05) (92% - 97%)    I&O's Detail    09 Jan 2019 07:01  -  10 Joaquín 2019 07:00  --------------------------------------------------------  IN:  Total IN: 0 mL    OUT:    Bulb: 30 mL    Bulb: 175 mL    Voided: 660 mL  Total OUT: 865 mL    Total NET: -865 mL          Constitutional: patient resting comfortably in bed, in no acute distress  HEENT: EOMI / PERRL b/l  Neck: No JVD, full ROM without pain   Respiratory: CTAB, respirations are unlabored, no accessory muscle use, no conversational dyspnea  Cardiovascular: regular rate & rhythm  Gastrointestinal: Abdomen soft, mildly tender on sandee-incisional region, non-distended, no rebound tenderness / guarding  Incision/Wound: Clean, dry and intact  Neurological: GCS: 15 (4/5/6). A&O x 3; no gross sensory / motor / coordination deficits  Musculoskeletal: No joint pain, swelling or deformity; no limitation of movement       LABS:                        10.7   10.0  )-----------( 205      ( 10 Joaquín 2019 07:57 )             34.0     01-10    135  |  99  |  10.0  ----------------------------<  307<H>  3.8   |  25.0  |  0.42<L>    Ca    7.6<L>      10 Joaquín 2019 07:57  Phos  2.6     01-10  Mg     2.1     01-10    TPro  6.1<L>  /  Alb  2.8<L>  /  TBili  0.4  /  DBili  x   /  AST  11  /  ALT  7   /  AlkPhos  103  01-10          MEDICATIONS  (STANDING):  ALBUTerol/ipratropium for Nebulization 3 milliLiter(s) Nebulizer every 6 hours  dextrose 5%. 1000 milliLiter(s) (50 mL/Hr) IV Continuous <Continuous>  dextrose 50% Injectable 12.5 Gram(s) IV Push once  dextrose 50% Injectable 25 Gram(s) IV Push once  dextrose 50% Injectable 25 Gram(s) IV Push once  enoxaparin Injectable 40 milliGRAM(s) SubCutaneous daily  insulin lispro (HumaLOG) corrective regimen sliding scale   SubCutaneous Before meals and at bedtime  potassium chloride    Tablet ER 40 milliEquivalent(s) Oral once    MEDICATIONS  (PRN):  dextrose 40% Gel 15 Gram(s) Oral once PRN Blood Glucose LESS THAN 70 milliGRAM(s)/deciliter  glucagon  Injectable 1 milliGRAM(s) IntraMuscular once PRN Glucose LESS THAN 70 milligrams/deciliter  traMADol 25 milliGRAM(s) Oral every 4 hours PRN Moderate Pain (4 - 6)  traMADol 50 milliGRAM(s) Oral every 4 hours PRN Severe Pain (7 - 10)      MICRO:   Cultures     STUDIES:   EKG, CXR, U/S, CT, MRI

## 2019-01-10 NOTE — DISCHARGE NOTE ADULT - PROVIDER TOKENS
FREE:[LAST:[[facility]],PHONE:[(   )    -],FAX:[(   )    -],ADDRESS:[[address] [place] [address]  [phone]]] FREE:[LAST:[acute care surgery outpatient [facility]],PHONE:[phone]],FAX:[(   )    -],ADDRESS:[[address] [place] [address]]],TOKEN:'[id number]'

## 2019-01-10 NOTE — CONSULT NOTE ADULT - ASSESSMENT
52 yo female came for abdominal pain due to abdominal hernia and is diabetic     DM2 mildly uncontrolled with a1c 7.4   check bgs actid and HS  ADA /diet   she can increase MF to 500 mg TID after discharge and follow up as outpatient with endocrinology.    Abdominal hernia:   s/p right hemicolectomy with primary ileocolic anastomosis, primary fascia closure with onlay biologic mesh.   follow up with surgery 52 yo female came for abdominal pain due to abdominal hernia and is diabetic     DM2 mildly uncontrolled with a1c 7.4   check bgs actid and HS  ADA /diet   she can increase MF to 500 mg TID after discharge and follow up as outpatient with endocrinology.  she can restart glipizide 5 mg qd (she is not sure what dose she takes .She thinks is 5 mg qd)   reccommend to follow up with my office to follow up as outpatient for optimum control of her DM2)    Abdominal hernia:   s/p right hemicolectomy with primary ileocolic anastomosis, primary fascia closure with onlay biologic mesh.   follow up with surgery     HTN : continue curent medication    HLD: she states she takes treatment at home

## 2019-01-10 NOTE — PROGRESS NOTE ADULT - ASSESSMENT
52yo female s/p right hemicolectomy with primary ileocolic anastomosis, primary fascia closure with onlay biologic mesh.   -pain control  -OOB and ambulation  -DVT Prophylaxis

## 2019-01-10 NOTE — PROGRESS NOTE ADULT - ATTENDING COMMENTS
Patient seen and examined.  remains extubated on HFNC, current smoker.  Passing flatus and pain under control.  High output from NGT secondary to being in the duodenum.  NGT removed, start liquids  OOB ambulate  remove Chan  to complete zosyn 4 days  dvt chemoprophylaxis
Poorly controlled DM2 with continue hyperglycemia.  Will restart home metformin for now.  Endocrine consult.  Diabetic educator given elevated HA1C.
Patient seen and examiend.   As above. plan to extubate and keep NPO for now
Patient seen and examined  tolerating diet, passing flatus and BM.  no dyspnea., incision c/d/i. JOSE CARLOS serous  to wean off HFVN, Sat ok above 89 (chronic smoker)  OOB, ambulate  advance diet  DVT chemoprophylaxes  Transfer to floor.

## 2019-01-10 NOTE — DISCHARGE NOTE ADULT - HOSPITAL COURSE
53 year old female presenting with abdominal pain x 4 days. ACS/Trauma consulted to evaluate patient's hernia. Patient seen and examined at bedside with on call surgery attending. Per patient report, she has had this hernia for the past 20 years and has always been asymptomatic (thus had no plans for any operative repair) was reducible when it was smaller but has not been reducible for the past couple of years. Also states that the hernia has always been soft on palpation but she noticed contents getting hard/firm 4 days ago.     Describes abdominal pain as located in the hernia as dull, intermittent non radiating pain that progressively got worse prompting her to come to the ED 1/6/19. Abdominal pain was associated with 2 episodes of bilious emesis over past 4 days. Poor PO intake secondary to nausea, vomiting and poor appetite (last meal Friday) Continues to have small BMs (last stool yesterday 1/5) and denies passing much flatus. Denies any surgical history and suspects that her hernia was secondary to diastasis rectus in the past versus umbilical hernia that worsened when she was pregnant. 53 year old female presenting with abdominal pain x 4 days. ACS/Trauma consulted to evaluate patient's hernia. Patient seen and examined at bedside with on call surgery attending. Per patient report, she has had this hernia for the past 20 years and has always been asymptomatic (thus had no plans for any operative repair) was reducible when it was smaller but has not been reducible for the past couple of years. Also states that the hernia has always been soft on palpation but she noticed contents getting hard/firm 4 days ago.     Describes abdominal pain as located in the hernia as dull, intermittent non radiating pain that progressively got worse prompting her to come to the ED 1/6/19. Abdominal pain was associated with 2 episodes of bilious emesis over past 4 days. Poor PO intake secondary to nausea, vomiting and poor appetite (last meal Friday) Continues to have small BMs (last stool yesterday 1/5) and denies passing much flatus. Denies any surgical history and suspects that her hernia was secondary to diastasis rectus in the past versus umbilical hernia that worsened when she was pregnant.  CT AP with large supraumbilical ventral hernia containing large and small bowel with signs indicating possible strangulation.  Pt taken emergently to OR on 1/7 for right hemicolectomy w/ primary ileocolic anastomosis primary fascia closure with onlay biologic mesh.  Post operatively pt admitted to SICU as pt was intubated and on Insulin gtt.  Pt Resuscitated, transferred to floor.  Pt's remaining post op course uncomplicated, diet advanced with progression of bowel function, voids, pain well controlled on PO pain meds.  Stable for d/c home today.

## 2019-01-11 VITALS — OXYGEN SATURATION: 95 %

## 2019-01-11 LAB
CULTURE RESULTS: SIGNIFICANT CHANGE UP
CULTURE RESULTS: SIGNIFICANT CHANGE UP
GLUCOSE BLDC GLUCOMTR-MCNC: 257 MG/DL — HIGH (ref 70–99)
SPECIMEN SOURCE: SIGNIFICANT CHANGE UP
SPECIMEN SOURCE: SIGNIFICANT CHANGE UP
SURGICAL PATHOLOGY STUDY: SIGNIFICANT CHANGE UP

## 2019-01-11 PROCEDURE — 84100 ASSAY OF PHOSPHORUS: CPT

## 2019-01-11 PROCEDURE — 87040 BLOOD CULTURE FOR BACTERIA: CPT

## 2019-01-11 PROCEDURE — 74177 CT ABD & PELVIS W/CONTRAST: CPT

## 2019-01-11 PROCEDURE — 85027 COMPLETE CBC AUTOMATED: CPT

## 2019-01-11 PROCEDURE — 71045 X-RAY EXAM CHEST 1 VIEW: CPT

## 2019-01-11 PROCEDURE — 82435 ASSAY OF BLOOD CHLORIDE: CPT

## 2019-01-11 PROCEDURE — 96375 TX/PRO/DX INJ NEW DRUG ADDON: CPT | Mod: XU

## 2019-01-11 PROCEDURE — 97163 PT EVAL HIGH COMPLEX 45 MIN: CPT

## 2019-01-11 PROCEDURE — 85014 HEMATOCRIT: CPT

## 2019-01-11 PROCEDURE — 82962 GLUCOSE BLOOD TEST: CPT

## 2019-01-11 PROCEDURE — 84132 ASSAY OF SERUM POTASSIUM: CPT

## 2019-01-11 PROCEDURE — 82436 ASSAY OF URINE CHLORIDE: CPT

## 2019-01-11 PROCEDURE — 97116 GAIT TRAINING THERAPY: CPT

## 2019-01-11 PROCEDURE — 96376 TX/PRO/DX INJ SAME DRUG ADON: CPT | Mod: XU

## 2019-01-11 PROCEDURE — 74018 RADEX ABDOMEN 1 VIEW: CPT

## 2019-01-11 PROCEDURE — 51702 INSERT TEMP BLADDER CATH: CPT

## 2019-01-11 PROCEDURE — 82803 BLOOD GASES ANY COMBINATION: CPT

## 2019-01-11 PROCEDURE — 96366 THER/PROPH/DIAG IV INF ADDON: CPT

## 2019-01-11 PROCEDURE — 36415 COLL VENOUS BLD VENIPUNCTURE: CPT

## 2019-01-11 PROCEDURE — 83735 ASSAY OF MAGNESIUM: CPT

## 2019-01-11 PROCEDURE — 99285 EMERGENCY DEPT VISIT HI MDM: CPT | Mod: 25

## 2019-01-11 PROCEDURE — 82570 ASSAY OF URINE CREATININE: CPT

## 2019-01-11 PROCEDURE — 83605 ASSAY OF LACTIC ACID: CPT

## 2019-01-11 PROCEDURE — 94640 AIRWAY INHALATION TREATMENT: CPT

## 2019-01-11 PROCEDURE — 94002 VENT MGMT INPAT INIT DAY: CPT

## 2019-01-11 PROCEDURE — 80048 BASIC METABOLIC PNL TOTAL CA: CPT

## 2019-01-11 PROCEDURE — 86901 BLOOD TYPING SEROLOGIC RH(D): CPT

## 2019-01-11 PROCEDURE — 99261: CPT

## 2019-01-11 PROCEDURE — 80053 COMPREHEN METABOLIC PANEL: CPT

## 2019-01-11 PROCEDURE — 88307 TISSUE EXAM BY PATHOLOGIST: CPT

## 2019-01-11 PROCEDURE — 85610 PROTHROMBIN TIME: CPT

## 2019-01-11 PROCEDURE — 84300 ASSAY OF URINE SODIUM: CPT

## 2019-01-11 PROCEDURE — 96365 THER/PROPH/DIAG IV INF INIT: CPT | Mod: XU

## 2019-01-11 PROCEDURE — 82947 ASSAY GLUCOSE BLOOD QUANT: CPT

## 2019-01-11 PROCEDURE — 93005 ELECTROCARDIOGRAM TRACING: CPT

## 2019-01-11 PROCEDURE — 82330 ASSAY OF CALCIUM: CPT

## 2019-01-11 PROCEDURE — 85730 THROMBOPLASTIN TIME PARTIAL: CPT

## 2019-01-11 PROCEDURE — 83036 HEMOGLOBIN GLYCOSYLATED A1C: CPT

## 2019-01-11 PROCEDURE — 86850 RBC ANTIBODY SCREEN: CPT

## 2019-01-11 PROCEDURE — 94760 N-INVAS EAR/PLS OXIMETRY 1: CPT

## 2019-01-11 PROCEDURE — 84295 ASSAY OF SERUM SODIUM: CPT

## 2019-01-11 PROCEDURE — 86900 BLOOD TYPING SEROLOGIC ABO: CPT

## 2019-01-11 PROCEDURE — 99222 1ST HOSP IP/OBS MODERATE 55: CPT

## 2019-01-11 RX ORDER — METFORMIN HYDROCHLORIDE 850 MG/1
1 TABLET ORAL
Qty: 90 | Refills: 0
Start: 2019-01-11 | End: 2019-02-09

## 2019-01-11 RX ORDER — METFORMIN HYDROCHLORIDE 850 MG/1
1 TABLET ORAL
Qty: 0 | Refills: 0 | DISCHARGE
Start: 2019-01-11 | End: 2019-02-09

## 2019-01-11 RX ADMIN — Medication 8: at 08:24

## 2019-01-11 RX ADMIN — ENOXAPARIN SODIUM 40 MILLIGRAM(S): 100 INJECTION SUBCUTANEOUS at 11:12

## 2019-01-11 RX ADMIN — Medication 3 MILLILITER(S): at 08:29

## 2019-01-11 RX ADMIN — METFORMIN HYDROCHLORIDE 500 MILLIGRAM(S): 850 TABLET ORAL at 05:44

## 2019-01-11 NOTE — PROGRESS NOTE ADULT - SUBJECTIVE AND OBJECTIVE BOX
INTERVAL HPI/OVERNIGHT EVENTS:  follow up for diabetes management.     MEDICATIONS  (STANDING):  ALBUTerol/ipratropium for Nebulization 3 milliLiter(s) Nebulizer every 6 hours  dextrose 5%. 1000 milliLiter(s) (50 mL/Hr) IV Continuous <Continuous>  dextrose 50% Injectable 12.5 Gram(s) IV Push once  dextrose 50% Injectable 25 Gram(s) IV Push once  dextrose 50% Injectable 25 Gram(s) IV Push once  enoxaparin Injectable 40 milliGRAM(s) SubCutaneous daily  insulin lispro (HumaLOG) corrective regimen sliding scale   SubCutaneous Before meals and at bedtime  metFORMIN 500 milliGRAM(s) Oral two times a day    MEDICATIONS  (PRN):  dextrose 40% Gel 15 Gram(s) Oral once PRN Blood Glucose LESS THAN 70 milliGRAM(s)/deciliter  glucagon  Injectable 1 milliGRAM(s) IntraMuscular once PRN Glucose LESS THAN 70 milligrams/deciliter  traMADol 25 milliGRAM(s) Oral every 4 hours PRN Moderate Pain (4 - 6)  traMADol 50 milliGRAM(s) Oral every 4 hours PRN Severe Pain (7 - 10)      Allergies  Biaxin (Vomiting)    Review of systems: no n/v/c/d    Vital Signs Last 24 Hrs  T(C): 36.6 (11 Jan 2019 08:00), Max: 36.6 (11 Jan 2019 08:00)  T(F): 97.9 (11 Jan 2019 08:00), Max: 97.9 (11 Jan 2019 08:00)  HR: 94 (11 Jan 2019 08:00) (94 - 94)  BP: 150/90 (11 Jan 2019 08:00) (150/90 - 150/90)  BP(mean): --  RR: 19 (11 Jan 2019 08:00) (19 - 19)  SpO2: 95% (11 Jan 2019 08:30) (95% - 95%)    PHYSICAL EXAM:  Constitutional: NAD, well-groomed  HEENT: PERRLA, EOMI  Neck: No LAD, No JVD, no thyroid enlargement  Respiratory: CTAB  Cardiovascular: S1 and S2, RRR, no M/G/R  Gastrointestinal: BS+, soft, no organomeglag or mass  Extremities: No peripheral edema, no pedal lesions  Vascular: 2+ peripheral pulses  Neurological: A/O x 3  Psychiatric: Normal mood, normal affect  Musculoskeletal: moves all extremities  Skin: No rashes        LABS:                        10.7   10.0  )-----------( 205      ( 10 Joaquín 2019 07:57 )             34.0     01-10    135  |  99  |  10.0  ----------------------------<  307<H>  3.8   |  25.0  |  0.42<L>    Ca    7.6<L>      10 Joaquín 2019 07:57  Phos  2.6     01-10  Mg     2.1     01-10    TPro  6.1<L>  /  Alb  2.8<L>  /  TBili  0.4  /  DBili  x   /  AST  11  /  ALT  7   /  AlkPhos  103  01-10

## 2019-01-11 NOTE — PROGRESS NOTE ADULT - ASSESSMENT
54yo female s/p right hemicolectomy with primary ileocolic anastomosis, primary fascia closure with onlay biologic mesh.   -pain control  -OOB and ambulation  -DVT Prophylaxis

## 2019-01-11 NOTE — CDI QUERY NOTE - NSCDIOTHERTXTBX_GEN_ALL_CORE_HH
Adm with bowel obstruction 2/2 ventral hernia w/ gangrene   s/p repair with mesh and R hemicolectomy  Findings: ischemia R colon w/ nonfixation, infarcted omentum      Please clarify, in addendum to op report or dc summary, the type of infarcted omentum   - found to have - acute infarcted omentum                           - diffuse acute infarcted omentum                          - focal acute infarcted omentum                          - other    Thank you

## 2019-01-11 NOTE — PROGRESS NOTE ADULT - SUBJECTIVE AND OBJECTIVE BOX
HPI/OVERNIGHT EVENTS: Patient seen and examined at bedside this AM. No acute events overnight per nursing reports. Pain controlled, voiding, full bowel function. Denies fever, chills, nausea, vomitting, chest pain, SOB, dizziness, abd pain or any other concerning symptoms    Vital Signs Last 24 Hrs  T(C): 36.9 (10 Joaquín 2019 16:11), Max: 36.9 (10 Joaquín 2019 16:11)  T(F): 98.4 (10 Joaquín 2019 16:11), Max: 98.4 (10 Joaquín 2019 16:11)  HR: 105 (10 Joaquín 2019 16:11) (88 - 105)  BP: 140/84 (10 Joaquín 2019 16:11) (140/84 - 140/84)  BP(mean): --  RR: 18 (10 Joaquín 2019 16:11) (18 - 18)  SpO2: 85% (10 Joaquín 2019 16:11) (85% - 94%)    I&O's Detail    09 Jan 2019 07:01  -  10 Joaquín 2019 07:00  --------------------------------------------------------  IN:  Total IN: 0 mL    OUT:    Bulb: 30 mL    Bulb: 175 mL    Voided: 660 mL  Total OUT: 865 mL    Total NET: -865 mL      10 Joaquín 2019 07:01  -  11 Jan 2019 01:17  --------------------------------------------------------  IN:  Total IN: 0 mL    OUT:    Bulb: 50 mL    Bulb: 20 mL  Total OUT: 70 mL    Total NET: -70 mL          Constitutional: patient resting comfortably in bed, in no acute distress  HEENT: EOMI / PERRL b/l  Neck: No JVD, full ROM without pain   Respiratory: CTAB, respirations are unlabored, no accessory muscle use, no conversational dyspnea  Cardiovascular: regular rate & rhythm  Gastrointestinal: Abdomen soft, mildly tender on sandee-incisional region, non-distended, no rebound tenderness / guarding  Incision/Wound: Clean, dry and intact  Neurological: GCS: 15 (4/5/6). A&O x 3; no gross sensory / motor / coordination deficits  Musculoskeletal: No joint pain, swelling or deformity; no limitation of movement     LABS:                        10.7   10.0  )-----------( 205      ( 10 Joaquín 2019 07:57 )             34.0     01-10    135  |  99  |  10.0  ----------------------------<  307<H>  3.8   |  25.0  |  0.42<L>    Ca    7.6<L>      10 Joaquín 2019 07:57  Phos  2.6     01-10  Mg     2.1     01-10    TPro  6.1<L>  /  Alb  2.8<L>  /  TBili  0.4  /  DBili  x   /  AST  11  /  ALT  7   /  AlkPhos  103  01-10          MEDICATIONS  (STANDING):  ALBUTerol/ipratropium for Nebulization 3 milliLiter(s) Nebulizer every 6 hours  dextrose 5%. 1000 milliLiter(s) (50 mL/Hr) IV Continuous <Continuous>  dextrose 50% Injectable 12.5 Gram(s) IV Push once  dextrose 50% Injectable 25 Gram(s) IV Push once  dextrose 50% Injectable 25 Gram(s) IV Push once  enoxaparin Injectable 40 milliGRAM(s) SubCutaneous daily  insulin lispro (HumaLOG) corrective regimen sliding scale   SubCutaneous Before meals and at bedtime  metFORMIN 500 milliGRAM(s) Oral two times a day    MEDICATIONS  (PRN):  dextrose 40% Gel 15 Gram(s) Oral once PRN Blood Glucose LESS THAN 70 milliGRAM(s)/deciliter  glucagon  Injectable 1 milliGRAM(s) IntraMuscular once PRN Glucose LESS THAN 70 milligrams/deciliter  traMADol 25 milliGRAM(s) Oral every 4 hours PRN Moderate Pain (4 - 6)  traMADol 50 milliGRAM(s) Oral every 4 hours PRN Severe Pain (7 - 10)      MICRO:   Cultures     STUDIES:   EKG, CXR, U/S, CT, MRI

## 2019-01-11 NOTE — PROGRESS NOTE ADULT - ASSESSMENT
52 yo female came for abdominal pain due to abdominal hernia and is diabetic     DM2 mildly uncontrolled with a1c 7.4   check bgs actid and HS. Bgs slightly high due to recent stress of surgery.   ADA /diet   increase metformin to 1000 mg am and 500 mg hs. we can consider increasing futher later to 1000 mG BID  she can restart glipizide 5 mg qd (she is not sure what dose she takes .She thinks is 5 mg qd)     Abdominal hernia:   s/p right hemicolectomy with primary ileocolic anastomosis, primary fascia closure with onlay biologic mesh.   follow up with surgery     HTN : continue curent medication    HLD: she states she takes treatment at home

## 2019-01-15 ENCOUNTER — EMERGENCY (EMERGENCY)
Facility: HOSPITAL | Age: 54
LOS: 1 days | Discharge: HOME | End: 2019-01-15
Attending: EMERGENCY MEDICINE
Payer: COMMERCIAL

## 2019-01-15 VITALS
SYSTOLIC BLOOD PRESSURE: 143 MMHG | OXYGEN SATURATION: 97 % | HEIGHT: 67 IN | DIASTOLIC BLOOD PRESSURE: 80 MMHG | HEART RATE: 89 BPM | TEMPERATURE: 99 F | WEIGHT: 199.96 LBS | RESPIRATION RATE: 20 BRPM

## 2019-01-15 PROCEDURE — 99283 EMERGENCY DEPT VISIT LOW MDM: CPT

## 2019-01-15 PROCEDURE — 99284 EMERGENCY DEPT VISIT MOD MDM: CPT

## 2019-01-15 RX ORDER — AZTREONAM 2 G
1 VIAL (EA) INJECTION
Qty: 20 | Refills: 0
Start: 2019-01-15 | End: 2019-01-24

## 2019-01-15 RX ADMIN — Medication 1 TABLET(S): at 21:37

## 2019-01-15 NOTE — CONSULT NOTE ADULT - ATTENDING COMMENTS
The patient was seen and examined  Details per the resident's consult note  The patient is s/p right partial colectomy and repair of ventral hernia  Comes to the ED with red wound  No other symptoms    Exam:  Abdomen is obese, soft, non-tender.  Wound with erythema 5 cm diameter around umbilicus    Impression:  Likely cellulitis    Plan:  Rx Bactrim  Patient to be seen in office in 48-hours if no improvement  Discussed with patient and

## 2019-01-15 NOTE — ED PROVIDER NOTE - ATTENDING CONTRIBUTION TO CARE
I, Zakia Chavez, independently evaluated the patient. The PA made the initial evaluation and discussed history, physical and plan with me and I agree. I examined the patient who is in no distress, midline abdominal scar with surrounding erythema and area of erythema towards the distal aspect of the scar with warmth and TTP, no purulent discharge, staples in place, JOSE CARLOS drains in place draining serosanginous fluid, and discussed need for PO ABx. Per surgery team, the patient will be placed on Bactrim and follow up in clinic in 2 days. No labs necessary at this time as patient is afebrile, tolerating PO, only mildly tender. I discussed indications to return to the ED and the importance of proper follow up with PMD. Patient verbalizes understanding and is comfortable with discharge at this time.

## 2019-01-15 NOTE — CONSULT NOTE ADULT - ASSESSMENT
54 y/o F h/o recent right hemicolectomy, primary anastomosis, ventral hernia repair with biologic mesh for strangulated ventral hernia with new onset erythema to inferior portion of wound concerning for cellulitis. Hemodynamically stable, afebrile.    Plan:  One staple remove from midline incision without drainage from wound  Erythema outlined with skin marker  Recommend antibiotics with Bactrim BID for 1 week  Patient recommended to keep follow-up appointment for next Thursday. If symptoms worsen, patient recommended to call office for appointment this coming Thursday.    Patient seen and evaluated with chief resident Dr. Martinez and attending Dr. Dow.

## 2019-01-15 NOTE — ED PROVIDER NOTE - OBJECTIVE STATEMENT
Patient is a 52 y/o female presenting for wound check. Patient was discharged from [facility] four days ago, had surgery performed by dr. dahl for hemicolectomy. Patient has a home nurse that has come to the house to check out the patients wound site. Patient states today the nurse was concerned that the wound could be infected, told her to come to the ED. Patient denies any pain over the site. Patient has two drains in place. Patient denies fevers. Patient states she is having bowel movements. Patient denies cp, SOB, abdominal pain, nausea, vomiting, constipation.

## 2019-01-15 NOTE — ED ADULT TRIAGE NOTE - CHIEF COMPLAINT QUOTE
hemicolectomy and hernia repair, also Appendix out.  Surgery 1/6/19. Dr Collins.  Redness and possible infection

## 2019-01-15 NOTE — CONSULT NOTE ADULT - SUBJECTIVE AND OBJECTIVE BOX
ACUTE CARE SURGERY CONSULT    Consulting surgical team: [facility] - Acute Care Surgery  Consulting attending: Dr. Dow  Patient seen and examined: 01-15-19 @ 22:28    HPI: 52 y/o F h/o recent right hemicolectomy, primary anastomosis, ventral hernia repair with biologic mesh for strangulated ventral hernia. Patient presents from home with redness at midline incision. Home care nurse recommended the patient be evaluated in the emergency department. No reported pain, fever, drainage from wound, nausea, or vomiting. Tolerating regular diet. No change in bowel function. No chest pain, dyspnea.    PAST MEDICAL HISTORY:  Hypercholesteremia  TIA (transient ischemic attack)  DM (diabetes mellitus)    PAST SURGICAL HISTORY:  Right hemicolectomy with primary anastomosis  Ventral hernia repair with biologic mesh    ALLERGIES:  Biaxin (Vomiting)    VITALS & I/Os:  Vital Signs Last 24 Hrs  T(C): 37.1 (15 Joaquín 2019 18:31), Max: 37.1 (15 Joaquín 2019 18:31)  T(F): 98.7 (15 Joaquín 2019 18:31), Max: 98.7 (15 Joaquín 2019 18:31)  HR: 89 (15 Joaquín 2019 18:31) (89 - 89)  BP: 143/80 (15 Joaquín 2019 18:31) (143/80 - 143/80)  BP(mean): --  RR: 20 (15 Joaquín 2019 18:31) (20 - 20)  SpO2: 97% (15 Joaquín 2019 18:31) (97% - 97%)  CAPILLARY BLOOD GLUCOSE    General: NAD, AOx3, comfortable on examination  HEENT: PERRLA, EOMI, moist mucous membranes  Neck: supple, nontender  Cardiovascular: heart RRR, no murmurs  Respiratory: no respiratory distress, CTAB  Abdomen: Soft, nontender, nondistended. Midline incision with staples intact, no drainage. Inferior portion with 2cm in diameter area of ecchymosis to right of incision with surrounding erythema.   Extremities: no peripheral edema. Normal ROM.  Integumentary: warm, no rash  Neuro: no motor or sensory deficits

## 2019-01-15 NOTE — ED PROVIDER NOTE - PHYSICAL EXAMINATION
Const: Awake, alert and oriented. In no acute distress. Well appearing.  HEENT: NC/AT. Moist mucous membranes.  Eyes: Conjunctiva pink, sclera white bilaterally. EOMI.  Neck:. Soft and supple. Full ROM without pain.  Cardiac:  +S1/S2. No murmurs.  Resp: Speaking in full sentences. No evidence of respiratory distress. No wheezes, rales or rhonchi.  Abd: Soft, non-tender, non-distended. Normal bowel sounds in all 4 quadrants. No guarding or rebound.  Back: Spine midline and non-tender. No CVAT.  Skin: Midline incision with staples intact, no drainage, area of ecchymosis to right of incision with surrounding erythema, two JOSE CARLOS drain in place, serosangious fluid b/l   Lymph: No cervical lymphadenopathy.  Neuro: Awake, alert & oriented x 3. Moves all extremities symmetrically.

## 2019-01-15 NOTE — ED PROVIDER NOTE - PROGRESS NOTE DETAILS
Surgery saw patient, stated patient is able to go home follow up outpatiently, will start on bactrim antibiotics to cover for infection, at this time patient is afebrile, no pus drainage, pt explained d/c instructions

## 2019-01-22 ENCOUNTER — RX RENEWAL (OUTPATIENT)
Age: 54
End: 2019-01-22

## 2019-01-24 ENCOUNTER — APPOINTMENT (OUTPATIENT)
Dept: TRAUMA SURGERY | Facility: CLINIC | Age: 54
End: 2019-01-24
Payer: COMMERCIAL

## 2019-01-24 VITALS
SYSTOLIC BLOOD PRESSURE: 146 MMHG | WEIGHT: 220 LBS | BODY MASS INDEX: 34.53 KG/M2 | TEMPERATURE: 97.4 F | OXYGEN SATURATION: 96 % | DIASTOLIC BLOOD PRESSURE: 78 MMHG | HEART RATE: 86 BPM | HEIGHT: 67 IN

## 2019-01-24 PROCEDURE — 99024 POSTOP FOLLOW-UP VISIT: CPT

## 2019-01-24 NOTE — ASSESSMENT
[FreeTextEntry1] : cont with current care. no lifting for 6 weeks, then light duty for 6 more weeks. \par fu in acs clinic next week for skin staple removal.\par complete course of oral abx

## 2019-01-24 NOTE — PHYSICAL EXAM
[de-identified] : a/o x 3 . nad [de-identified] : soft obese, nt/nd. horacio's with serous fluid, resolved cellulitis , horacio's removed.

## 2019-01-24 NOTE — HISTORY OF PRESENT ILLNESS
[FreeTextEntry1] : s/p ex lap , right hemicolectomy , partial omentectomy, primary anastomosis, ventral hernia repair with biologic onlay mesh. no n/v/f/c noted . on oral abx for abd wall cellulitis. horacio's b/l with minimal output.

## 2019-01-25 RX ORDER — METFORMIN HYDROCHLORIDE 850 MG/1
0 TABLET ORAL
Qty: 0 | Refills: 0 | COMMUNITY

## 2019-01-31 ENCOUNTER — APPOINTMENT (OUTPATIENT)
Dept: TRAUMA SURGERY | Facility: CLINIC | Age: 54
End: 2019-01-31
Payer: COMMERCIAL

## 2019-01-31 VITALS
DIASTOLIC BLOOD PRESSURE: 83 MMHG | OXYGEN SATURATION: 97 % | HEIGHT: 67 IN | SYSTOLIC BLOOD PRESSURE: 148 MMHG | BODY MASS INDEX: 35 KG/M2 | TEMPERATURE: 97.9 F | WEIGHT: 223 LBS | HEART RATE: 92 BPM

## 2019-01-31 PROCEDURE — 99024 POSTOP FOLLOW-UP VISIT: CPT

## 2019-01-31 NOTE — HISTORY OF PRESENT ILLNESS
[FreeTextEntry1] : The patient is here for routine follow-up\par No new problems\par Eating okay\par Completed course of oral antibiotics

## 2019-01-31 NOTE — ASSESSMENT
[FreeTextEntry1] : Remove staples\par Encouraged to increase activity daily\par No heavy lifting\par RTC 3 weeks.

## 2019-02-04 PROBLEM — E11.9 TYPE 2 DIABETES MELLITUS WITHOUT COMPLICATIONS: Chronic | Status: ACTIVE | Noted: 2019-01-06

## 2019-02-04 PROBLEM — E78.00 PURE HYPERCHOLESTEROLEMIA, UNSPECIFIED: Chronic | Status: ACTIVE | Noted: 2019-01-06

## 2019-02-04 PROBLEM — G45.9 TRANSIENT CEREBRAL ISCHEMIC ATTACK, UNSPECIFIED: Chronic | Status: ACTIVE | Noted: 2019-01-06

## 2019-02-21 ENCOUNTER — APPOINTMENT (OUTPATIENT)
Dept: TRAUMA SURGERY | Facility: CLINIC | Age: 54
End: 2019-02-21
Payer: COMMERCIAL

## 2019-02-21 VITALS — SYSTOLIC BLOOD PRESSURE: 141 MMHG | DIASTOLIC BLOOD PRESSURE: 96 MMHG

## 2019-02-21 VITALS
BODY MASS INDEX: 36.1 KG/M2 | TEMPERATURE: 98.2 F | DIASTOLIC BLOOD PRESSURE: 112 MMHG | WEIGHT: 230 LBS | SYSTOLIC BLOOD PRESSURE: 176 MMHG | HEIGHT: 67 IN | HEART RATE: 94 BPM | OXYGEN SATURATION: 98 %

## 2019-02-21 PROCEDURE — 99024 POSTOP FOLLOW-UP VISIT: CPT

## 2019-02-22 ENCOUNTER — TRANSCRIPTION ENCOUNTER (OUTPATIENT)
Age: 54
End: 2019-02-22

## 2019-02-22 NOTE — HISTORY OF PRESENT ILLNESS
[FreeTextEntry1] : s/p ex lap , right hemicolectomy , partial omentectomy, primary anastomosis, ventral hernia repair with biologic onlay mesh. no n/v/f/c noted . doing well .

## 2019-02-22 NOTE — PHYSICAL EXAM
[de-identified] : a/o x 3 .NAD [de-identified] : soft nt/nd midline is well healed no cellulitis noted. no recurrence noted.

## 2019-02-22 NOTE — ASSESSMENT
[FreeTextEntry1] : doing well .\par start increasing exercise - walking , light lifting <15lbs\par recommend f/u with pmd for ? adjustment to htm meds, dm monitoring, and possible depression.\par f/u in acs 1 month.

## 2019-03-14 ENCOUNTER — APPOINTMENT (OUTPATIENT)
Dept: TRAUMA SURGERY | Facility: CLINIC | Age: 54
End: 2019-03-14
Payer: COMMERCIAL

## 2019-03-14 VITALS
HEIGHT: 67 IN | RESPIRATION RATE: 16 BRPM | TEMPERATURE: 98.1 F | BODY MASS INDEX: 35.47 KG/M2 | WEIGHT: 226 LBS | OXYGEN SATURATION: 99 % | DIASTOLIC BLOOD PRESSURE: 89 MMHG | HEART RATE: 109 BPM | SYSTOLIC BLOOD PRESSURE: 159 MMHG

## 2019-03-14 VITALS — DIASTOLIC BLOOD PRESSURE: 96 MMHG | SYSTOLIC BLOOD PRESSURE: 171 MMHG

## 2019-03-14 PROCEDURE — 99024 POSTOP FOLLOW-UP VISIT: CPT

## 2019-03-14 NOTE — HISTORY OF PRESENT ILLNESS
[FreeTextEntry1] : s/p ex lap , right hemicolectomy , partial omentectomy, primary anastomosis, ventral hernia repair with biologic onlay mesh.doing well . lost wt. +diarrhea intermittently 10 day. .

## 2019-03-14 NOTE — ASSESSMENT
[FreeTextEntry1] : diarrhea : will watchful waiting for now , no fever, no systemic sx. if cont will need stool cx sent. f/u next week if diarrhea continues. if resolves f/u in one month.

## 2019-03-14 NOTE — PHYSICAL EXAM
[de-identified] : a/o x 3 .NAD [de-identified] : soft nt/nd midline is well healed no cellulitis noted. no recurrence noted.

## 2019-03-17 LAB
C DIFF TOX GENS STL QL NAA+PROBE: NORMAL
CDIFF BY PCR: NOT DETECTED

## 2019-03-18 LAB — RV AG STL QL IA: NORMAL

## 2019-03-19 LAB — BACTERIA STL CULT: NORMAL

## 2019-03-21 LAB — DEPRECATED O AND P PREP STL: NORMAL

## 2019-03-27 ENCOUNTER — APPOINTMENT (OUTPATIENT)
Dept: GASTROENTEROLOGY | Facility: CLINIC | Age: 54
End: 2019-03-27

## 2019-03-28 ENCOUNTER — APPOINTMENT (OUTPATIENT)
Dept: TRAUMA SURGERY | Facility: CLINIC | Age: 54
End: 2019-03-28
Payer: COMMERCIAL

## 2019-03-28 VITALS
BODY MASS INDEX: 35.16 KG/M2 | TEMPERATURE: 97.4 F | HEART RATE: 103 BPM | HEIGHT: 67 IN | SYSTOLIC BLOOD PRESSURE: 172 MMHG | WEIGHT: 224 LBS | OXYGEN SATURATION: 98 % | DIASTOLIC BLOOD PRESSURE: 101 MMHG

## 2019-03-28 VITALS — DIASTOLIC BLOOD PRESSURE: 110 MMHG | SYSTOLIC BLOOD PRESSURE: 176 MMHG

## 2019-03-28 PROCEDURE — 99024 POSTOP FOLLOW-UP VISIT: CPT

## 2019-03-28 NOTE — PHYSICAL EXAM
[de-identified] : a/o x 3 nad [de-identified] : soft nd nt small area of redness to upper wound started yesterday, no collection noted.

## 2019-03-28 NOTE — HISTORY OF PRESENT ILLNESS
[FreeTextEntry1] : s/p ex lap , right hemicolectomy , partial omentectomy, primary anastomosis, ventral hernia repair with biologic onlay mesh.doing well . lost wt. +diarrhea intermittently 10 day. .  stool studies have been negative. and diarrhea has resolved. no nvfc

## 2019-03-28 NOTE — ASSESSMENT
[FreeTextEntry1] : marked the area of skin\par will follow closely if worsens then will need abx if needed.\par f/u next week to reevaluate

## 2019-04-01 ENCOUNTER — APPOINTMENT (OUTPATIENT)
Dept: TRAUMA SURGERY | Facility: CLINIC | Age: 54
End: 2019-04-01
Payer: COMMERCIAL

## 2019-04-01 VITALS
WEIGHT: 224 LBS | BODY MASS INDEX: 35.16 KG/M2 | HEART RATE: 109 BPM | DIASTOLIC BLOOD PRESSURE: 101 MMHG | HEIGHT: 67 IN | OXYGEN SATURATION: 97 % | SYSTOLIC BLOOD PRESSURE: 172 MMHG | TEMPERATURE: 98.1 F

## 2019-04-01 PROCEDURE — 99024 POSTOP FOLLOW-UP VISIT: CPT

## 2019-04-03 NOTE — ASSESSMENT
[FreeTextEntry1] : RTC prn \par F/u PMD and  GI \par if any a cute  symptoms  and  or  concerns  occur call back ACS or  go  directly  to  [facility]

## 2019-04-03 NOTE — PHYSICAL EXAM
[Abdominal Masses] : No abdominal masses [Abdomen Tenderness] : ~T ~M No abdominal tenderness [No Rash or Lesion] : No rash or lesion [Alert] : alert [Oriented to Person] : oriented to person [Oriented to Place] : oriented to place [Oriented to Time] : oriented to time [Calm] : calm [de-identified] : wdwn  female  in  nad  [de-identified] : non icteric sclera [de-identified] : trachea  midline [de-identified] : soft nd nt small area of redness to upper wound with no extension, no collection noted. non tender     weight of  patient concentrated  to  abdomen  no palpable masses  midline incision c/d/i  no  guarding   no rebound

## 2019-04-03 NOTE — HISTORY OF PRESENT ILLNESS
[FreeTextEntry1] : s/p ex lap , right hemicolectomy , partial omentectomy, primary anastomosis, ventral hernia repair with biologic onlay mesh.doing well . lost wt. +diarrhea intermittently 10 day. .  stool studies have been negative.  no nvfc Patient is still experiencing  intermittent  bouts of  diarrhea   no  timing  to  food or time  of  day No incontinence  no  fevers  no  chills

## 2019-04-03 NOTE — REVIEW OF SYSTEMS
[As Noted in HPI] : as noted in HPI [Diarrhea] : diarrhea [Negative] : Heme/Lymph [FreeTextEntry7] : post op   / intermittent  bouts  of  diarrhea

## 2019-04-08 ENCOUNTER — APPOINTMENT (OUTPATIENT)
Dept: TRAUMA SURGERY | Facility: CLINIC | Age: 54
End: 2019-04-08
Payer: COMMERCIAL

## 2019-04-08 VITALS
TEMPERATURE: 98.1 F | HEART RATE: 105 BPM | HEIGHT: 67 IN | WEIGHT: 224 LBS | OXYGEN SATURATION: 99 % | DIASTOLIC BLOOD PRESSURE: 81 MMHG | BODY MASS INDEX: 35.16 KG/M2 | RESPIRATION RATE: 16 BRPM | SYSTOLIC BLOOD PRESSURE: 139 MMHG

## 2019-04-08 PROCEDURE — 99024 POSTOP FOLLOW-UP VISIT: CPT

## 2019-04-08 NOTE — ASSESSMENT
[FreeTextEntry1] : Patient  to  follow up  with  GI \par Careful watch  of  skin to incision site - if  no  improvement  will consider CT of  abdomen\par any  acute  symptoms  and  or  concerns  call back ACS or  go directly to [facility]\par RTC ACS in one  week

## 2019-04-08 NOTE — PHYSICAL EXAM
[Abdominal Masses] : No abdominal masses [Abdomen Tenderness] : ~T ~M No abdominal tenderness [No Rash or Lesion] : No rash or lesion [Alert] : alert [Oriented to Person] : oriented to person [Oriented to Place] : oriented to place [Oriented to Time] : oriented to time [Calm] : calm [de-identified] : wdwn  female  in  nad  [de-identified] : non icteric sclera PERRLA EOMS intact [de-identified] : trachea  midline [de-identified] : soft nd nt small area of redness to upper wound with no extension, no collection noted. non tender     weight of  patient concentrated  to  abdomen  no palpable masses  midline incision c/d/i  no  guarding   no rebound skin intact

## 2019-04-08 NOTE — REVIEW OF SYSTEMS
[As Noted in HPI] : as noted in HPI [Diarrhea] : diarrhea [Negative] : Heme/Lymph [FreeTextEntry7] : post op   / intermittent  bouts  of  diarrhea [de-identified] : redness  to  proximal area  of  incision site

## 2019-04-08 NOTE — HISTORY OF PRESENT ILLNESS
[FreeTextEntry1] : S/p ex lap , right hemicolectomy , partial omentectomy, primary anastomosis, ventral hernia repair with biologic onlay mesh.doing well . lost wt. +diarrhea intermittently for 10 day.  with improvement  to nl BM.  stool studies have been negative.  no nvfc Patient feeling  much better at time of t his exam  No incontinence  no  fevers  no  chills  Redness  at  abdomen  has persisted  with  no  advancement  no pain no  skin breakdown Patient  has  returned  to  work  with no consequences

## 2019-04-15 ENCOUNTER — APPOINTMENT (OUTPATIENT)
Dept: TRAUMA SURGERY | Facility: CLINIC | Age: 54
End: 2019-04-15
Payer: COMMERCIAL

## 2019-04-15 VITALS
OXYGEN SATURATION: 97 % | HEART RATE: 109 BPM | SYSTOLIC BLOOD PRESSURE: 168 MMHG | TEMPERATURE: 98.2 F | WEIGHT: 223 LBS | DIASTOLIC BLOOD PRESSURE: 90 MMHG | BODY MASS INDEX: 35 KG/M2 | HEIGHT: 67 IN

## 2019-04-15 DIAGNOSIS — Z98.890 OTHER SPECIFIED POSTPROCEDURAL STATES: ICD-10-CM

## 2019-04-15 DIAGNOSIS — Z87.19 OTHER SPECIFIED POSTPROCEDURAL STATES: ICD-10-CM

## 2019-04-15 DIAGNOSIS — Z90.49 ACQUIRED ABSENCE OF OTHER SPECIFIED PARTS OF DIGESTIVE TRACT: ICD-10-CM

## 2019-04-15 PROCEDURE — 99024 POSTOP FOLLOW-UP VISIT: CPT

## 2019-04-15 NOTE — ASSESSMENT
[FreeTextEntry1] : RTC for  recheck  of  skin integrity over incision site \par F/U as  instructed  by GI \par any acute symptoms  and or concerns  call back ACS or  go  directly to [facility]

## 2019-04-15 NOTE — HISTORY OF PRESENT ILLNESS
[FreeTextEntry1] : S/p ex lap , right hemicolectomy , partial omentectomy, primary anastomosis, ventral hernia repair with biologic onlay mesh.doing well . lost wt. +diarrhea intermittently with improvement  t stool studies have been negative.  no nvfc Patient feeling  much better at time of t his exam  No incontinence  no  fevers  no  chills  Redness  at  abdomen  has persisted  with  no  advancement  no pain no  skin breakdown Patient  has  returned  to  work  with no consequences Patient seen  last week by her GI  feels  symptoms  are associated with her IBS    patient has a colonoscopy set up

## 2019-04-15 NOTE — PHYSICAL EXAM
[Abdominal Masses] : No abdominal masses [Abdomen Tenderness] : ~T ~M No abdominal tenderness [No Rash or Lesion] : No rash or lesion [Alert] : alert [Oriented to Person] : oriented to person [Oriented to Place] : oriented to place [Oriented to Time] : oriented to time [Calm] : calm [de-identified] : wdwn  female  in  nad  [de-identified] : non icteric sclera PERRLA EOMS intact [de-identified] : trachea  midline [de-identified] : soft nd nt small area of redness to upper wound with no extension, no collection noted. non tender     weight of  patient concentrated  to  abdomen  no palpable masses  midline incision c/d/i  no  guarding   no rebound skin intact  no lesions

## 2019-04-15 NOTE — REVIEW OF SYSTEMS
[As Noted in HPI] : as noted in HPI [Diarrhea] : diarrhea [Negative] : Heme/Lymph [FreeTextEntry7] : post op   / intermittent  bouts  of  diarrhea [de-identified] : redness  to  proximal area  of  incision site

## 2019-05-02 ENCOUNTER — APPOINTMENT (OUTPATIENT)
Dept: TRAUMA SURGERY | Facility: CLINIC | Age: 54
End: 2019-05-02
Payer: COMMERCIAL

## 2019-05-02 VITALS
RESPIRATION RATE: 16 BRPM | BODY MASS INDEX: 35.63 KG/M2 | TEMPERATURE: 97.6 F | OXYGEN SATURATION: 97 % | DIASTOLIC BLOOD PRESSURE: 84 MMHG | HEART RATE: 115 BPM | SYSTOLIC BLOOD PRESSURE: 170 MMHG | WEIGHT: 227.03 LBS | HEIGHT: 67 IN

## 2019-05-02 PROCEDURE — 99212 OFFICE O/P EST SF 10 MIN: CPT

## 2019-05-02 NOTE — HISTORY OF PRESENT ILLNESS
[FreeTextEntry1] : S/P repair of strangulated ventral hernia in JAN\par For past 1 month has been having diarrhea (8 liquid BMs/day)\par Has seen a GI doctor--pending colonoscopy\par Associated with fatigue and some "redness" of abdominal wall at incision\par

## 2019-05-02 NOTE — PHYSICAL EXAM
[de-identified] : Obese, soft, non-tender.  Incision healing well, mild erythema at upper aspect  of inision

## 2020-09-13 ENCOUNTER — INPATIENT (INPATIENT)
Facility: HOSPITAL | Age: 55
LOS: 7 days | Discharge: HOME | DRG: 853 | End: 2020-09-21
Attending: INTERNAL MEDICINE | Admitting: HOSPITALIST
Payer: COMMERCIAL

## 2020-09-13 VITALS
RESPIRATION RATE: 20 BRPM | WEIGHT: 229.94 LBS | OXYGEN SATURATION: 97 % | HEIGHT: 67 IN | DIASTOLIC BLOOD PRESSURE: 76 MMHG | HEART RATE: 128 BPM | SYSTOLIC BLOOD PRESSURE: 135 MMHG | TEMPERATURE: 99 F

## 2020-09-13 LAB
ALBUMIN SERPL ELPH-MCNC: 3.4 G/DL — SIGNIFICANT CHANGE UP (ref 3.3–5.2)
ALP SERPL-CCNC: 165 U/L — HIGH (ref 40–120)
ALT FLD-CCNC: 16 U/L — SIGNIFICANT CHANGE UP
ANION GAP SERPL CALC-SCNC: 15 MMOL/L — SIGNIFICANT CHANGE UP (ref 5–17)
APTT BLD: 28.6 SEC — SIGNIFICANT CHANGE UP (ref 27.5–35.5)
AST SERPL-CCNC: 14 U/L — SIGNIFICANT CHANGE UP
BASOPHILS # BLD AUTO: 0.03 K/UL — SIGNIFICANT CHANGE UP (ref 0–0.2)
BASOPHILS NFR BLD AUTO: 0.2 % — SIGNIFICANT CHANGE UP (ref 0–2)
BILIRUB SERPL-MCNC: 0.8 MG/DL — SIGNIFICANT CHANGE UP (ref 0.4–2)
BLD GP AB SCN SERPL QL: SIGNIFICANT CHANGE UP
BUN SERPL-MCNC: 21 MG/DL — HIGH (ref 8–20)
CALCIUM SERPL-MCNC: 8.8 MG/DL — SIGNIFICANT CHANGE UP (ref 8.6–10.2)
CHLORIDE SERPL-SCNC: 93 MMOL/L — LOW (ref 98–107)
CO2 SERPL-SCNC: 20 MMOL/L — LOW (ref 22–29)
CREAT SERPL-MCNC: 0.94 MG/DL — SIGNIFICANT CHANGE UP (ref 0.5–1.3)
EOSINOPHIL # BLD AUTO: 0.01 K/UL — SIGNIFICANT CHANGE UP (ref 0–0.5)
EOSINOPHIL NFR BLD AUTO: 0.1 % — SIGNIFICANT CHANGE UP (ref 0–6)
GLUCOSE SERPL-MCNC: 367 MG/DL — HIGH (ref 70–99)
HCT VFR BLD CALC: 37 % — SIGNIFICANT CHANGE UP (ref 34.5–45)
HGB BLD-MCNC: 12.9 G/DL — SIGNIFICANT CHANGE UP (ref 11.5–15.5)
IMM GRANULOCYTES NFR BLD AUTO: 1.4 % — SIGNIFICANT CHANGE UP (ref 0–1.5)
INR BLD: 1.26 RATIO — HIGH (ref 0.88–1.16)
LACTATE BLDV-MCNC: 1.3 MMOL/L — SIGNIFICANT CHANGE UP (ref 0.5–2)
LYMPHOCYTES # BLD AUTO: 0.92 K/UL — LOW (ref 1–3.3)
LYMPHOCYTES # BLD AUTO: 4.7 % — LOW (ref 13–44)
MCHC RBC-ENTMCNC: 31.7 PG — SIGNIFICANT CHANGE UP (ref 27–34)
MCHC RBC-ENTMCNC: 34.9 GM/DL — SIGNIFICANT CHANGE UP (ref 32–36)
MCV RBC AUTO: 90.9 FL — SIGNIFICANT CHANGE UP (ref 80–100)
MONOCYTES # BLD AUTO: 1.12 K/UL — HIGH (ref 0–0.9)
MONOCYTES NFR BLD AUTO: 5.7 % — SIGNIFICANT CHANGE UP (ref 2–14)
NEUTROPHILS # BLD AUTO: 17.13 K/UL — HIGH (ref 1.8–7.4)
NEUTROPHILS NFR BLD AUTO: 87.9 % — HIGH (ref 43–77)
PLATELET # BLD AUTO: 173 K/UL — SIGNIFICANT CHANGE UP (ref 150–400)
POTASSIUM SERPL-MCNC: 3.6 MMOL/L — SIGNIFICANT CHANGE UP (ref 3.5–5.3)
POTASSIUM SERPL-SCNC: 3.6 MMOL/L — SIGNIFICANT CHANGE UP (ref 3.5–5.3)
PROT SERPL-MCNC: 6.9 G/DL — SIGNIFICANT CHANGE UP (ref 6.6–8.7)
PROTHROM AB SERPL-ACNC: 14.5 SEC — HIGH (ref 10.6–13.6)
RBC # BLD: 4.07 M/UL — SIGNIFICANT CHANGE UP (ref 3.8–5.2)
RBC # FLD: 12.6 % — SIGNIFICANT CHANGE UP (ref 10.3–14.5)
SODIUM SERPL-SCNC: 128 MMOL/L — LOW (ref 135–145)
WBC # BLD: 19.48 K/UL — HIGH (ref 3.8–10.5)
WBC # FLD AUTO: 19.48 K/UL — HIGH (ref 3.8–10.5)

## 2020-09-13 PROCEDURE — 99285 EMERGENCY DEPT VISIT HI MDM: CPT

## 2020-09-13 PROCEDURE — 93010 ELECTROCARDIOGRAM REPORT: CPT

## 2020-09-13 PROCEDURE — 74177 CT ABD & PELVIS W/CONTRAST: CPT | Mod: 26

## 2020-09-13 PROCEDURE — 73660 X-RAY EXAM OF TOE(S): CPT | Mod: 26,RT

## 2020-09-13 RX ORDER — VANCOMYCIN HCL 1 G
1500 VIAL (EA) INTRAVENOUS ONCE
Refills: 0 | Status: COMPLETED | OUTPATIENT
Start: 2020-09-13 | End: 2020-09-13

## 2020-09-13 RX ORDER — VANCOMYCIN HCL 1 G
1550 VIAL (EA) INTRAVENOUS ONCE
Refills: 0 | Status: DISCONTINUED | OUTPATIENT
Start: 2020-09-13 | End: 2020-09-13

## 2020-09-13 RX ORDER — INSULIN HUMAN 100 [IU]/ML
8 INJECTION, SOLUTION SUBCUTANEOUS ONCE
Refills: 0 | Status: COMPLETED | OUTPATIENT
Start: 2020-09-13 | End: 2020-09-13

## 2020-09-13 RX ORDER — ONDANSETRON 8 MG/1
8 TABLET, FILM COATED ORAL ONCE
Refills: 0 | Status: COMPLETED | OUTPATIENT
Start: 2020-09-13 | End: 2020-09-13

## 2020-09-13 RX ORDER — PIPERACILLIN AND TAZOBACTAM 4; .5 G/20ML; G/20ML
3.38 INJECTION, POWDER, LYOPHILIZED, FOR SOLUTION INTRAVENOUS ONCE
Refills: 0 | Status: COMPLETED | OUTPATIENT
Start: 2020-09-13 | End: 2020-09-13

## 2020-09-13 RX ORDER — SODIUM CHLORIDE 9 MG/ML
3200 INJECTION, SOLUTION INTRAVENOUS ONCE
Refills: 0 | Status: COMPLETED | OUTPATIENT
Start: 2020-09-13 | End: 2020-09-13

## 2020-09-13 RX ADMIN — Medication 300 MILLIGRAM(S): at 20:41

## 2020-09-13 RX ADMIN — INSULIN HUMAN 8 UNIT(S): 100 INJECTION, SOLUTION SUBCUTANEOUS at 22:47

## 2020-09-13 RX ADMIN — SODIUM CHLORIDE 3200 MILLILITER(S): 9 INJECTION, SOLUTION INTRAVENOUS at 20:41

## 2020-09-13 RX ADMIN — ONDANSETRON 8 MILLIGRAM(S): 8 TABLET, FILM COATED ORAL at 20:41

## 2020-09-13 RX ADMIN — PIPERACILLIN AND TAZOBACTAM 200 GRAM(S): 4; .5 INJECTION, POWDER, LYOPHILIZED, FOR SOLUTION INTRAVENOUS at 20:42

## 2020-09-13 NOTE — ED ADULT TRIAGE NOTE - PAIN RATING/NUMBER SCALE (0-10): REST
Hospital Medicine Daily Progress Note    Date of Service  1/25/2019    Chief Complaint  53 y.o. male admitted 12/28/2018 with syncope.    Hospital Course      PMHx CHD, DM, HTN, HLD, and recent hospitalization for afib s/p cardioversion who had difficult to control orthostatic hypotension at that time. He had right and left cardiac cath at that time and no cardiac cause of orthostatic hypotension was identified. He was discharged on florinef, salt tabs, midodrine.  He presented with syncope while walking and back pain. He was found with L1 compression fracture. Dr Pepper with NSG recommended TLSO and management for osteoporosis. Had had CT head and carotid doppler that was unremarkable. Recent TTE showed normal EF with mild MR and TR. MRI brain showed chronic changes. TSH was normal. He has orthostatic hypotension that's been difficult to correct despite multiple medications.   He has chronic leg swelling that has been worse since lasix stopped for hypotension. Doppler was negative for DVT bilaterally. He was noted to have right hand cellulitis and treated with clindamycin.  He continues to feel dizzy at times even when laying down. Suffered from a syncopal episode while sitting at edge of bed resulting in fall. Code blue was called as patient was unresponsive for several seconds. This followed by a psychogenic non epileptic seizure. Luckily patient did not suffer from any significant trauma from this fall. Upon discussion, patient's xarelto was discontinued as we both felt the risks of bleeding at this time outweigh the benefits due to his recurrent falls. Attempt was made to get droxidopa as an outpatient started in the hospital but this is a specialized medication which requires documents to be completed for initiation.  Recliner wheelchair, larry lift, and bedside commode has been arranged for patient and he understands he will likely need to stay in that position due to his debilitating orthostatic hypotension.  He will also need outpatient arrangement of some orthostatic hypotension specialist.          Interval Problem Update  Systolic blood pressure is noticed to be high early in the morning, after his fludrocortisone dose  Will hold his Mydfrin dose for high systolic pressure for now.  Moving his fludrocortisone dose to 10 AM rather than 6 AM  Still having depression and suicidal ideation.    On Legal hold   Currently on fluoxetine 40 mg and Aripiprazole (Abilify) 5 mg.    Discussed with patient, nurse, and with multidisciplinary team during including , and charge nurse.     Consultants/Specialty  Psychiatry     Code Status  Full    Disposition  TBD     Review of Systems  Review of Systems   Constitutional: Negative for chills and fever.   HENT: Negative for congestion and sore throat.    Eyes: Negative for blurred vision and double vision.   Respiratory: Negative for cough, shortness of breath and stridor.    Cardiovascular: Positive for leg swelling. Negative for chest pain.   Gastrointestinal: Negative for abdominal pain and nausea.   Genitourinary: Negative for dysuria and urgency.   Musculoskeletal: Positive for back pain. Negative for neck pain.   Skin: Negative for rash.   Neurological: Positive for dizziness (On standing). Negative for seizures, loss of consciousness and headaches.   Psychiatric/Behavioral: Positive for depression and suicidal ideas.        Physical Exam  Temp:  [36.4 °C (97.5 °F)-36.7 °C (98.1 °F)] 36.6 °C (97.9 °F)  Pulse:  [57-75] 58  Resp:  [17-18] 17  BP: ()/(47-96) 121/56  SpO2:  [94 %-100 %] 96 %    Physical Exam   Constitutional: He is oriented to person, place, and time. He appears well-developed and well-nourished.   HENT:   Head: Normocephalic.   Mouth/Throat: Oropharynx is clear and moist.   Eyes: Conjunctivae are normal. Right eye exhibits no discharge. Left eye exhibits no discharge.   Neck: Neck supple. No JVD present.   Cardiovascular: Normal rate and regular  rhythm.    No murmur heard.  Pulmonary/Chest: Effort normal and breath sounds normal. No respiratory distress. He has no wheezes.   Abdominal: Soft. He exhibits no distension. There is no tenderness.   Musculoskeletal: He exhibits edema (Marked B/L LE edema ). He exhibits no tenderness.   Neurological: He is alert and oriented to person, place, and time.   Skin: Skin is warm and dry.   Psychiatric: He has a normal mood and affect.   Nursing note and vitals reviewed.    Fluids    Intake/Output Summary (Last 24 hours) at 01/25/19 2005  Last data filed at 01/25/19 1700   Gross per 24 hour   Intake              740 ml   Output             1550 ml   Net             -810 ml     Laboratory      Recent Labs      01/23/19   0401   SODIUM  135   POTASSIUM  4.1   CHLORIDE  102   CO2  27   GLUCOSE  98   BUN  28*   CREATININE  0.79   CALCIUM  9.7                   Imaging  DX-HIP-UNILATERAL-WITH PELVIS-1 VIEW RIGHT   Final Result      No radiographic evidence of acute traumatic injury right hip.      DX-CHEST-PORTABLE (1 VIEW)   Final Result      Hypoinflation without other evidence for acute cardiopulmonary disease.      MR-BRAIN-W/O   Final Result      1.  MRI of the brain without contrast within normal limits for age with mild white matter changes.   2.  Chronic appearing sinus disease   3.  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 This does not have an aggressive appearance.      US-EXTREMITY VENOUS LOWER BILAT   Final Result      CT-HAND WITH RIGHT   Final Result      1.  Edema in the dorsum of the forearm and hand without a discrete fluid collection to suggest abscess is identified.      US-CAROTID DOPPLER BILAT   Final Result      DX-LUMBAR SPINE-2 OR 3 VIEWS   Final Result         Stable compression deformity in the L1 vertebral body.      CT-CTA CHEST PULMONARY ARTERY W/ RECONS   Final Result         1. No CT evidence of pulmonary embolism.      2. Old right rib  fractures.      3. Cholelithiasis. Enlarged spleen with granulomas.      4. Mild superior endplate compression deformity of L1 could be acute. Correlate with point tenderness. No retropulsion. No malalignment.      CT-CSPINE WITHOUT PLUS RECONS   Final Result         1. No acute fracture from C1 through T3 is visualized.         CT-HEAD W/O   Final Result         1. No acute intracranial abnormality. No evidence of acute intracranial hemorrhage or mass lesion.               DX-THORACIC SPINE-WITH SWIMMERS VIEW   Final Result      No acute thoracic spine fracture or subluxation identified.      DX-CHEST-PORTABLE (1 VIEW)   Final Result      No acute cardiac or pulmonary abnormality is noted.           Assessment/Plan  * Orthostatic hypotension- (present on admission)   Assessment & Plan    Possibly secondary to dysautonomia secondary to diabetes. Recently admitted for this issue for almost a mohan  Ongoing despite midodrine, fludrocortisone, mestinon, reglan, vit B12 and solumedrol, salt tabs, compression stockings  PT/OT   Recliner wheelchair, larry, and bedside commode have been ordered by previous attending   Attempt was made to work with SW to  outpatient prescription of droxidopa so we may start in house but it is unfortunately a very specialized medication.  Previous provider discussed with Dr. Childress about neurology assistance with starting this medication but he recommended outpatient follow up  Ultimate plan is to arrange for a safe discharge with patient's current condition and severe symptoms which are not being controlled with above measures yet    Patient has been noticed to have marked systolic hypertension after the dose of fludrocortisone in the morning, the dose was moved to 10 AM 6 AM on 1/26/2019      Suicidal ideation   Assessment & Plan    On 1/23/2019 patient expressed that he is having thoughts of harming himself.    He reports that he has been depressed however not open about it.    He  "reports that he has a plan to kill himself by cutting his wrist.    He reports that he had tried cutting his right wrist around 1-1/2 months before.   Patient has been placed on a legal hold, psychiatry consulted.    Currently on fluoxetine 40 mg and Aripiprazole (Abilify) 5 mg.     Still expressing depression and suicidal ideation.     Fall from ground level   Assessment & Plan    Fell while sitting at bedside on 1/9. See above       Lumbar compression fracture (HCC)   Assessment & Plan    Pain management as needed.    Neurosurgery recommended TLSO brace.   PT OT recommended home health.    F/u with [facility]      Psychogenic nonepileptic seizure- (present on admission)   Assessment & Plan    Pt had seizure like episode after he fell  Has been evaluated by neuro and normal EEG even during \"episodes\"  AVOID benzodiazepines       Vitamin B12 deficiency- (present on admission)   Assessment & Plan    Started intramuscular injections      Syncope- (present on admission)   Assessment & Plan    Due to orthostasis.    CT of the head unremarkable. MRI brain showed chronic changes  Carotid Doppler did not reveal significant stenosis   Recent TTE showed normal EF, mild TR and MR   1/9: syncopal episode while sitting at edge of bed.    Pt has been advised to not sit up without assistance       Debility- (present on admission)   Assessment & Plan    Multifactorial, age, obesity, nutrition   PT/OT > wheel chair 22\", ordered      Acute respiratory failure with hypoxia (HCC)   Assessment & Plan    Resolved     Chronic diastolic heart failure (HCC)- (present on admission)   Assessment & Plan    Without current exacerbation.    Chronic lower extremity edema -chronic venous insufficiency        Bilateral leg edema- (present on admission)   Assessment & Plan    Appears to have chronic edematous changes.  Likely secondary to venous insufficiency.    Recent TTE showed intact EF  Lasix has been held for orthostatic " hypotension  Ultrasound for DVT was study was done and it was negative for DVT  Compression stockings ordered        Venous stasis dermatitis of both lower extremities- (present on admission)   Assessment & Plan    Holding lasix  Compression stockings      Paroxysmal A-fib (HCC)- (present on admission)   Assessment & Plan    Continue xarelto and amiodarone  Sinus rhythm   Patient's [id number] is 3 indicating 4.6% of stroke/tia/systemic embolisation per year.   HASBLED score is 2 indicating 4.1% bleeding risk.   Due to patient's significant orthostatic hypotension and recurrent falls the risks of bleeding outweigh the benefit of anticoagulation at this point of time.  I discussed this in detail with patient and he is agreeable.    Educated patient that if at some point in time his orthostatic hypotension and falls resolve/improve then at that time might be a good time to resume anticoagulation.  I also discussed with him that if he did want to resume anticoagulation that I would recommend warfarin as there is a reversal agent for this.  Currently on aspirin       Essential hypertension- (present on admission)   Assessment & Plan    Lasix held for orthostatic hypotension    Midodrine will result in supine hypertension but this is necessary to try to optomize orthostatic hypotension       Mixed hyperlipidemia- (present on admission)   Assessment & Plan    Continue current therapy            VTE prophylaxis: SCDs     0

## 2020-09-13 NOTE — ED PROVIDER NOTE - CLINICAL SUMMARY MEDICAL DECISION MAKING FREE TEXT BOX
1st great toe infection with fevers today concern for sepsis and extensive toe infection too high risk decopmpensation if sent home clincally pt agrees to plan of care

## 2020-09-13 NOTE — ED PROVIDER NOTE - OBJECTIVE STATEMENT
56 y/o F pt with hx of DM, HLD, TIA, hernia repair, and bowel resection presents to ED c/o fever, chills, nausea, vomiting, decreased appetite and constipation that began yesterday. Pt also notes blood blister to R great toe that began a few days ago but popped on her way to the ED today. Pt states she has been passing gas. She had a fever of 101 F and took Tylenol with some relief. denies HA or neck pain. no chest pain or sob. no abd pain. no diarrhea. no urinary f/u/d. no back pain. no motor or sensory deficits. denies illicit drug use. no recent travel. no rash. no other acute issues symptoms or concerns.  Podiatrist: Dr. Mccollum in [facility]

## 2020-09-13 NOTE — ED ADULT TRIAGE NOTE - CHIEF COMPLAINT QUOTE
Patient reports she is diabetic and has a blood blister on her toe that ruptured and she keeps vomiting.

## 2020-09-14 DIAGNOSIS — Z98.890 OTHER SPECIFIED POSTPROCEDURAL STATES: Chronic | ICD-10-CM

## 2020-09-14 DIAGNOSIS — A41.89 OTHER SPECIFIED SEPSIS: ICD-10-CM

## 2020-09-14 DIAGNOSIS — Z90.49 ACQUIRED ABSENCE OF OTHER SPECIFIED PARTS OF DIGESTIVE TRACT: Chronic | ICD-10-CM

## 2020-09-14 LAB
ANION GAP SERPL CALC-SCNC: 12 MMOL/L — SIGNIFICANT CHANGE UP (ref 5–17)
APPEARANCE UR: CLEAR — SIGNIFICANT CHANGE UP
BACTERIA # UR AUTO: ABNORMAL
BILIRUB UR-MCNC: NEGATIVE — SIGNIFICANT CHANGE UP
BUN SERPL-MCNC: 18 MG/DL — SIGNIFICANT CHANGE UP (ref 8–20)
CALCIUM SERPL-MCNC: 8.7 MG/DL — SIGNIFICANT CHANGE UP (ref 8.6–10.2)
CHLORIDE SERPL-SCNC: 96 MMOL/L — LOW (ref 98–107)
CO2 SERPL-SCNC: 23 MMOL/L — SIGNIFICANT CHANGE UP (ref 22–29)
COLOR SPEC: YELLOW — SIGNIFICANT CHANGE UP
COMMENT - URINE: SIGNIFICANT CHANGE UP
CREAT SERPL-MCNC: 0.83 MG/DL — SIGNIFICANT CHANGE UP (ref 0.5–1.3)
DIFF PNL FLD: ABNORMAL
EPI CELLS # UR: SIGNIFICANT CHANGE UP
GLUCOSE BLDC GLUCOMTR-MCNC: 239 MG/DL — HIGH (ref 70–99)
GLUCOSE BLDC GLUCOMTR-MCNC: 276 MG/DL — HIGH (ref 70–99)
GLUCOSE BLDC GLUCOMTR-MCNC: 307 MG/DL — HIGH (ref 70–99)
GLUCOSE BLDC GLUCOMTR-MCNC: 329 MG/DL — HIGH (ref 70–99)
GLUCOSE SERPL-MCNC: 150 MG/DL — HIGH (ref 70–99)
GLUCOSE UR QL: 250 MG/DL
HCT VFR BLD CALC: 35.6 % — SIGNIFICANT CHANGE UP (ref 34.5–45)
HGB BLD-MCNC: 12 G/DL — SIGNIFICANT CHANGE UP (ref 11.5–15.5)
KETONES UR-MCNC: NEGATIVE — SIGNIFICANT CHANGE UP
LEUKOCYTE ESTERASE UR-ACNC: NEGATIVE — SIGNIFICANT CHANGE UP
MCHC RBC-ENTMCNC: 31.1 PG — SIGNIFICANT CHANGE UP (ref 27–34)
MCHC RBC-ENTMCNC: 33.7 GM/DL — SIGNIFICANT CHANGE UP (ref 32–36)
MCV RBC AUTO: 92.2 FL — SIGNIFICANT CHANGE UP (ref 80–100)
NITRITE UR-MCNC: NEGATIVE — SIGNIFICANT CHANGE UP
OSMOLALITY UR: 272 MOSM/KG — LOW (ref 300–1000)
PH UR: 6.5 — SIGNIFICANT CHANGE UP (ref 5–8)
PLATELET # BLD AUTO: 110 K/UL — LOW (ref 150–400)
POTASSIUM SERPL-MCNC: 3.7 MMOL/L — SIGNIFICANT CHANGE UP (ref 3.5–5.3)
POTASSIUM SERPL-SCNC: 3.7 MMOL/L — SIGNIFICANT CHANGE UP (ref 3.5–5.3)
PROT UR-MCNC: 30 MG/DL
RBC # BLD: 3.86 M/UL — SIGNIFICANT CHANGE UP (ref 3.8–5.2)
RBC # FLD: 12.8 % — SIGNIFICANT CHANGE UP (ref 10.3–14.5)
RBC CASTS # UR COMP ASSIST: ABNORMAL /HPF (ref 0–4)
SARS-COV-2 IGG SERPL QL IA: NEGATIVE — SIGNIFICANT CHANGE UP
SARS-COV-2 IGM SERPL IA-ACNC: <3.8 AU/ML — SIGNIFICANT CHANGE UP
SARS-COV-2 RNA SPEC QL NAA+PROBE: SIGNIFICANT CHANGE UP
SODIUM SERPL-SCNC: 131 MMOL/L — LOW (ref 135–145)
SODIUM UR-SCNC: 36 MMOL/L — SIGNIFICANT CHANGE UP
SP GR SPEC: 1 — LOW (ref 1.01–1.02)
UROBILINOGEN FLD QL: NEGATIVE MG/DL — SIGNIFICANT CHANGE UP
WBC # BLD: 16.13 K/UL — HIGH (ref 3.8–10.5)
WBC # FLD AUTO: 16.13 K/UL — HIGH (ref 3.8–10.5)
WBC UR QL: SIGNIFICANT CHANGE UP

## 2020-09-14 PROCEDURE — 99222 1ST HOSP IP/OBS MODERATE 55: CPT

## 2020-09-14 PROCEDURE — 99281 EMR DPT VST MAYX REQ PHY/QHP: CPT

## 2020-09-14 PROCEDURE — 76856 US EXAM PELVIC COMPLETE: CPT | Mod: 26,59

## 2020-09-14 PROCEDURE — 73630 X-RAY EXAM OF FOOT: CPT | Mod: 26,RT

## 2020-09-14 PROCEDURE — 99232 SBSQ HOSP IP/OBS MODERATE 35: CPT

## 2020-09-14 PROCEDURE — 76830 TRANSVAGINAL US NON-OB: CPT | Mod: 26

## 2020-09-14 PROCEDURE — 12345: CPT | Mod: NC

## 2020-09-14 PROCEDURE — 99223 1ST HOSP IP/OBS HIGH 75: CPT

## 2020-09-14 RX ORDER — GEMFIBROZIL 600 MG
0 TABLET ORAL
Qty: 0 | Refills: 0 | DISCHARGE

## 2020-09-14 RX ORDER — SODIUM CHLORIDE 9 MG/ML
1000 INJECTION, SOLUTION INTRAVENOUS
Refills: 0 | Status: DISCONTINUED | OUTPATIENT
Start: 2020-09-14 | End: 2020-09-17

## 2020-09-14 RX ORDER — INSULIN LISPRO 100/ML
5 VIAL (ML) SUBCUTANEOUS
Refills: 0 | Status: DISCONTINUED | OUTPATIENT
Start: 2020-09-14 | End: 2020-09-16

## 2020-09-14 RX ORDER — GLUCAGON INJECTION, SOLUTION 0.5 MG/.1ML
1 INJECTION, SOLUTION SUBCUTANEOUS ONCE
Refills: 0 | Status: DISCONTINUED | OUTPATIENT
Start: 2020-09-14 | End: 2020-09-17

## 2020-09-14 RX ORDER — DEXTROSE 50 % IN WATER 50 %
12.5 SYRINGE (ML) INTRAVENOUS ONCE
Refills: 0 | Status: DISCONTINUED | OUTPATIENT
Start: 2020-09-14 | End: 2020-09-17

## 2020-09-14 RX ORDER — SODIUM CHLORIDE 9 MG/ML
1000 INJECTION, SOLUTION INTRAVENOUS
Refills: 0 | Status: COMPLETED | OUTPATIENT
Start: 2020-09-14 | End: 2020-09-14

## 2020-09-14 RX ORDER — ATORVASTATIN CALCIUM 80 MG/1
0 TABLET, FILM COATED ORAL
Qty: 0 | Refills: 0 | DISCHARGE

## 2020-09-14 RX ORDER — DEXTROSE 50 % IN WATER 50 %
25 SYRINGE (ML) INTRAVENOUS ONCE
Refills: 0 | Status: DISCONTINUED | OUTPATIENT
Start: 2020-09-14 | End: 2020-09-17

## 2020-09-14 RX ORDER — ACETAMINOPHEN 500 MG
650 TABLET ORAL ONCE
Refills: 0 | Status: COMPLETED | OUTPATIENT
Start: 2020-09-14 | End: 2020-09-14

## 2020-09-14 RX ORDER — LISINOPRIL 2.5 MG/1
1 TABLET ORAL
Qty: 0 | Refills: 0 | DISCHARGE

## 2020-09-14 RX ORDER — INSULIN GLARGINE 100 [IU]/ML
18 INJECTION, SOLUTION SUBCUTANEOUS AT BEDTIME
Refills: 0 | Status: DISCONTINUED | OUTPATIENT
Start: 2020-09-14 | End: 2020-09-14

## 2020-09-14 RX ORDER — HEPARIN SODIUM 5000 [USP'U]/ML
5000 INJECTION INTRAVENOUS; SUBCUTANEOUS EVERY 8 HOURS
Refills: 0 | Status: DISCONTINUED | OUTPATIENT
Start: 2020-09-14 | End: 2020-09-17

## 2020-09-14 RX ORDER — INSULIN GLARGINE 100 [IU]/ML
12 INJECTION, SOLUTION SUBCUTANEOUS AT BEDTIME
Refills: 0 | Status: DISCONTINUED | OUTPATIENT
Start: 2020-09-14 | End: 2020-09-16

## 2020-09-14 RX ORDER — LISINOPRIL 2.5 MG/1
0 TABLET ORAL
Qty: 0 | Refills: 0 | DISCHARGE

## 2020-09-14 RX ORDER — ASPIRIN/CALCIUM CARB/MAGNESIUM 324 MG
81 TABLET ORAL DAILY
Refills: 0 | Status: DISCONTINUED | OUTPATIENT
Start: 2020-09-14 | End: 2020-09-17

## 2020-09-14 RX ORDER — LISINOPRIL 2.5 MG/1
5 TABLET ORAL DAILY
Refills: 0 | Status: DISCONTINUED | OUTPATIENT
Start: 2020-09-14 | End: 2020-09-17

## 2020-09-14 RX ORDER — PIPERACILLIN AND TAZOBACTAM 4; .5 G/20ML; G/20ML
3.38 INJECTION, POWDER, LYOPHILIZED, FOR SOLUTION INTRAVENOUS EVERY 8 HOURS
Refills: 0 | Status: DISCONTINUED | OUTPATIENT
Start: 2020-09-14 | End: 2020-09-17

## 2020-09-14 RX ORDER — VANCOMYCIN HCL 1 G
1000 VIAL (EA) INTRAVENOUS EVERY 12 HOURS
Refills: 0 | Status: DISCONTINUED | OUTPATIENT
Start: 2020-09-14 | End: 2020-09-16

## 2020-09-14 RX ORDER — INSULIN LISPRO 100/ML
VIAL (ML) SUBCUTANEOUS
Refills: 0 | Status: DISCONTINUED | OUTPATIENT
Start: 2020-09-14 | End: 2020-09-14

## 2020-09-14 RX ORDER — ATORVASTATIN CALCIUM 80 MG/1
20 TABLET, FILM COATED ORAL AT BEDTIME
Refills: 0 | Status: DISCONTINUED | OUTPATIENT
Start: 2020-09-14 | End: 2020-09-17

## 2020-09-14 RX ORDER — DEXTROSE 50 % IN WATER 50 %
15 SYRINGE (ML) INTRAVENOUS ONCE
Refills: 0 | Status: DISCONTINUED | OUTPATIENT
Start: 2020-09-14 | End: 2020-09-17

## 2020-09-14 RX ORDER — ONDANSETRON 8 MG/1
4 TABLET, FILM COATED ORAL EVERY 6 HOURS
Refills: 0 | Status: DISCONTINUED | OUTPATIENT
Start: 2020-09-14 | End: 2020-09-17

## 2020-09-14 RX ORDER — INSULIN LISPRO 100/ML
VIAL (ML) SUBCUTANEOUS
Refills: 0 | Status: DISCONTINUED | OUTPATIENT
Start: 2020-09-14 | End: 2020-09-17

## 2020-09-14 RX ORDER — INSULIN LISPRO 100/ML
VIAL (ML) SUBCUTANEOUS AT BEDTIME
Refills: 0 | Status: DISCONTINUED | OUTPATIENT
Start: 2020-09-14 | End: 2020-09-14

## 2020-09-14 RX ADMIN — HEPARIN SODIUM 5000 UNIT(S): 5000 INJECTION INTRAVENOUS; SUBCUTANEOUS at 05:39

## 2020-09-14 RX ADMIN — HEPARIN SODIUM 5000 UNIT(S): 5000 INJECTION INTRAVENOUS; SUBCUTANEOUS at 21:04

## 2020-09-14 RX ADMIN — SODIUM CHLORIDE 100 MILLILITER(S): 9 INJECTION, SOLUTION INTRAVENOUS at 05:38

## 2020-09-14 RX ADMIN — Medication 250 MILLIGRAM(S): at 05:39

## 2020-09-14 RX ADMIN — Medication 81 MILLIGRAM(S): at 08:50

## 2020-09-14 RX ADMIN — PIPERACILLIN AND TAZOBACTAM 25 GRAM(S): 4; .5 INJECTION, POWDER, LYOPHILIZED, FOR SOLUTION INTRAVENOUS at 13:45

## 2020-09-14 RX ADMIN — PIPERACILLIN AND TAZOBACTAM 25 GRAM(S): 4; .5 INJECTION, POWDER, LYOPHILIZED, FOR SOLUTION INTRAVENOUS at 21:12

## 2020-09-14 RX ADMIN — INSULIN GLARGINE 12 UNIT(S): 100 INJECTION, SOLUTION SUBCUTANEOUS at 21:03

## 2020-09-14 RX ADMIN — Medication 5 UNIT(S): at 16:41

## 2020-09-14 RX ADMIN — Medication 650 MILLIGRAM(S): at 08:43

## 2020-09-14 RX ADMIN — ATORVASTATIN CALCIUM 20 MILLIGRAM(S): 80 TABLET, FILM COATED ORAL at 21:04

## 2020-09-14 RX ADMIN — HEPARIN SODIUM 5000 UNIT(S): 5000 INJECTION INTRAVENOUS; SUBCUTANEOUS at 13:45

## 2020-09-14 RX ADMIN — Medication 650 MILLIGRAM(S): at 06:21

## 2020-09-14 RX ADMIN — Medication 8: at 11:32

## 2020-09-14 RX ADMIN — Medication 250 MILLIGRAM(S): at 21:04

## 2020-09-14 RX ADMIN — Medication 4: at 08:49

## 2020-09-14 RX ADMIN — PIPERACILLIN AND TAZOBACTAM 25 GRAM(S): 4; .5 INJECTION, POWDER, LYOPHILIZED, FOR SOLUTION INTRAVENOUS at 05:39

## 2020-09-14 NOTE — H&P ADULT - ATTENDING COMMENTS
Pt seen and examined. Case discussed with Telehospitalist. Agree with documentation above. Sepsis 2/2 infected DM2 Ulcer on R 1st Digit. Also incidental seroma noted.

## 2020-09-14 NOTE — CONSULT NOTE ADULT - SUBJECTIVE AND OBJECTIVE BOX
Patient is a 55y old  Female who presents with a chief complaint of Sepsis 2/2 Infected R 1st digit DM Ulcer (14 Sep 2020 01:15)      HPI:  56 yo female with history of DM type 2, TIA, HLD, R hemicolectomy, primary anastomosis, ventral hernia repair with biologic mesh for strangulated ventral hernia who now presents with 'blood blister' on toe and sepsis with leukocytosis, tachycardia and low grade temperature. Patient reports she had 1 month ago removal of a wart on her right 1st toe with Dr. Mccollum podiatry, has not had issues with it since but yesterday noted that after stepping out of the shower she has blister on her toe. She called her podiatrist that night to make appointment and ended having nausea and vomiting. The following day she had enlargement of her toe and fever at home. She took tyelnol and came to the ED.    In the ED for N/V ED ordered CT abdomen that did not show obstruction but did show large ventral wall seroma. At this time she has no complaints of abdominal pain or further N/V. She is feeling better in the ED. She denies any other infectious symptomatology. Denies chest pain, SOB, cough. +fever/chills. She received 1 L of LR and vancomycin and zosyn.   (14 Sep 2020 01:15)      PMH:Hypercholesteremia    TIA (transient ischemic attack)    DM (diabetes mellitus)      Allergies: Biaxin (Vomiting)    Medications: aspirin  chewable 81 milliGRAM(s) Oral daily  atorvastatin 20 milliGRAM(s) Oral at bedtime  dextrose 40% Gel 15 Gram(s) Oral once PRN  dextrose 5%. 1000 milliLiter(s) IV Continuous <Continuous>  dextrose 50% Injectable 12.5 Gram(s) IV Push once  dextrose 50% Injectable 25 Gram(s) IV Push once  dextrose 50% Injectable 25 Gram(s) IV Push once  glucagon  Injectable 1 milliGRAM(s) IntraMuscular once PRN  heparin   Injectable 5000 Unit(s) SubCutaneous every 8 hours  insulin lispro (HumaLOG) corrective regimen sliding scale   SubCutaneous three times a day before meals  insulin lispro (HumaLOG) corrective regimen sliding scale   SubCutaneous at bedtime  ondansetron Injectable 4 milliGRAM(s) IV Push every 6 hours PRN  piperacillin/tazobactam IVPB.. 3.375 Gram(s) IV Intermittent every 8 hours  vancomycin  IVPB 1000 milliGRAM(s) IV Intermittent every 12 hours    FH:No pertinent family history in first degree relatives      PSX: S/P right hemicolectomy    S/P repair of ventral hernia      SH: aspirin  chewable 81 milliGRAM(s) Oral daily  atorvastatin 20 milliGRAM(s) Oral at bedtime  dextrose 40% Gel 15 Gram(s) Oral once PRN  dextrose 5%. 1000 milliLiter(s) IV Continuous <Continuous>  dextrose 50% Injectable 12.5 Gram(s) IV Push once  dextrose 50% Injectable 25 Gram(s) IV Push once  dextrose 50% Injectable 25 Gram(s) IV Push once  glucagon  Injectable 1 milliGRAM(s) IntraMuscular once PRN  heparin   Injectable 5000 Unit(s) SubCutaneous every 8 hours  insulin lispro (HumaLOG) corrective regimen sliding scale   SubCutaneous three times a day before meals  insulin lispro (HumaLOG) corrective regimen sliding scale   SubCutaneous at bedtime  ondansetron Injectable 4 milliGRAM(s) IV Push every 6 hours PRN  piperacillin/tazobactam IVPB.. 3.375 Gram(s) IV Intermittent every 8 hours  vancomycin  IVPB 1000 milliGRAM(s) IV Intermittent every 12 hours      Vital Signs Last 24 Hrs  T(C): 36.7 (14 Sep 2020 11:24), Max: 38.7 (14 Sep 2020 05:45)  T(F): 98.1 (14 Sep 2020 11:24), Max: 101.7 (14 Sep 2020 05:45)  HR: 96 (14 Sep 2020 11:24) (96 - 128)  BP: 130/80 (14 Sep 2020 11:24) (114/67 - 151/64)  BP(mean): --  RR: 18 (14 Sep 2020 11:24) (18 - 20)  SpO2: 96% (14 Sep 2020 11:24) (92% - 97%)    LABS                        12.0   16.13 )-----------( 110      ( 14 Sep 2020 02:25 )             35.6               09-14    131<L>  |  96<L>  |  18.0  ----------------------------<  150<H>  3.7   |  23.0  |  0.83    Ca    8.7      14 Sep 2020 02:25    TPro  6.9  /  Alb  3.4  /  TBili  0.8  /  DBili  x   /  AST  14  /  ALT  16  /  AlkPhos  165<H>  09-13      ROS  REVIEW OF SYSTEMS:    CONSTITUTIONAL: No fever, weight loss, or fatigue  EYES: No eye pain, visual disturbances, or discharge  ENMT:  No difficulty hearing, tinnitus, vertigo; No sinus or throat pain  NECK: No pain or stiffness  BREASTS: No pain, masses, or nipple discharge  RESPIRATORY: No cough, wheezing, chills or hemoptysis; No shortness of breath  CARDIOVASCULAR: No chest pain, palpitations, dizziness, or leg swelling  GASTROINTESTINAL: No abdominal or epigastric pain. No nausea, vomiting, or hematemesis; No diarrhea or constipation. No melena or hematochezia.  GENITOURINARY: No dysuria, frequency, hematuria, or incontinence  NEUROLOGICAL: No headaches, memory loss, loss of strength, numbness, or tremors  SKIN: No itching, burning, rashes, or lesions   LYMPH NODES: No enlarged glands  ENDOCRINE: No heat or cold intolerance; No hair loss  MUSCULOSKELETAL: No joint pain or swelling; No muscle, back, or extremity pain  PSYCHIATRIC: No depression, anxiety, mood swings, or difficulty sleeping  HEME/LYMPH: No easy bruising, or bleeding gums  ALLERY AND IMMUNOLOGIC: No hives or eczema      PHYSICAL EXAM  GEN: FARHAT SLADE is a pleasant well-nourished, well developed 55y Female in no acute distress, alert awake, and oriented to person, place and time.   LE Focused:    Vasc:    Derm:   Neuro:   MSK:  Imaging: ?xray  Cultures:        Patient is a 55y old  Female who presents with a chief complaint of Sepsis 2/2 Infected R 1st digit DM Ulcer (14 Sep 2020 01:15)      HPI:  54 yo female with history of DM type 2, TIA, HLD, R hemicolectomy, primary anastomosis, ventral hernia repair with biologic mesh for strangulated ventral hernia who now presents with 'blood blister' on toe and sepsis with leukocytosis, tachycardia and low grade temperature. Patient reports she had 1 month ago removal of a wart on her right 1st toe with Dr. Mccollum podiatry, has not had issues with it since but yesterday noted that after stepping out of the shower she has blister on her toe. She called her podiatrist that night to make appointment and ended having nausea and vomiting. The following day she had enlargement of her toe and fever at home. She took tyelnol and came to the ED.    In the ED for N/V ED ordered CT abdomen that did not show obstruction but did show large ventral wall seroma. At this time she has no complaints of abdominal pain or further N/V. She is feeling better in the ED. She denies any other infectious symptomatology. Denies chest pain, SOB, cough. +fever/chills. She received 1 L of LR and vancomycin and zosyn.   (14 Sep 2020 01:15)    History as above. Pt seen bedside in NAD. States that she has a blood blister on her R big toe for several days and has gotten worse and more swollen. Adds she used to follow a podiatrist but has not seen for a few months. Came to ED for fever and nausea. Denies any pain to the foot.     PMH: Hypercholesteremia    TIA (transient ischemic attack)    DM (diabetes mellitus)      Allergies: Biaxin (Vomiting)    Medications: aspirin  chewable 81 milliGRAM(s) Oral daily  atorvastatin 20 milliGRAM(s) Oral at bedtime  dextrose 40% Gel 15 Gram(s) Oral once PRN  dextrose 5%. 1000 milliLiter(s) IV Continuous <Continuous>  dextrose 50% Injectable 12.5 Gram(s) IV Push once  dextrose 50% Injectable 25 Gram(s) IV Push once  dextrose 50% Injectable 25 Gram(s) IV Push once  glucagon  Injectable 1 milliGRAM(s) IntraMuscular once PRN  heparin   Injectable 5000 Unit(s) SubCutaneous every 8 hours  insulin lispro (HumaLOG) corrective regimen sliding scale   SubCutaneous three times a day before meals  insulin lispro (HumaLOG) corrective regimen sliding scale   SubCutaneous at bedtime  ondansetron Injectable 4 milliGRAM(s) IV Push every 6 hours PRN  piperacillin/tazobactam IVPB.. 3.375 Gram(s) IV Intermittent every 8 hours  vancomycin  IVPB 1000 milliGRAM(s) IV Intermittent every 12 hours    FH:No pertinent family history in first degree relatives      PSX: S/P right hemicolectomy    S/P repair of ventral hernia      Vital Signs Last 24 Hrs  T(C): 36.7 (14 Sep 2020 11:24), Max: 38.7 (14 Sep 2020 05:45)  T(F): 98.1 (14 Sep 2020 11:24), Max: 101.7 (14 Sep 2020 05:45)  HR: 96 (14 Sep 2020 11:24) (96 - 128)  BP: 130/80 (14 Sep 2020 11:24) (114/67 - 151/64)  BP(mean): --  RR: 18 (14 Sep 2020 11:24) (18 - 20)  SpO2: 96% (14 Sep 2020 11:24) (92% - 97%)    LABS                        12.0   16.13 )-----------( 110      ( 14 Sep 2020 02:25 )             35.6               09-14    WBC Count: 16.13 K/uL (09-14 @ 02:25)  WBC Count: 19.48 K/uL (09-13 @ 20:24)      131<L>  |  96<L>  |  18.0  ----------------------------<  150<H>  3.7   |  23.0  |  0.83    Ca    8.7      14 Sep 2020 02:25    TPro  6.9  /  Alb  3.4  /  TBili  0.8  /  DBili  x   /  AST  14  /  ALT  16  /  AlkPhos  165<H>  09-13      ROS  REVIEW OF SYSTEMS:    CONSTITUTIONAL: No fever, weight loss, or fatigue  EYES: No eye pain, visual disturbances, or discharge  ENMT:  No difficulty hearing, tinnitus, vertigo; No sinus or throat pain  NECK: No pain or stiffness  BREASTS: No pain, masses, or nipple discharge  RESPIRATORY: No cough, wheezing, chills or hemoptysis; No shortness of breath  CARDIOVASCULAR: No chest pain, palpitations, dizziness, or leg swelling  GASTROINTESTINAL: No abdominal or epigastric pain. No nausea, vomiting, or hematemesis; No diarrhea or constipation. No melena or hematochezia.  GENITOURINARY: No dysuria, frequency, hematuria, or incontinence  NEUROLOGICAL: No headaches, memory loss, loss of strength, numbness, or tremors  SKIN: R big toe blister with opening   LYMPH NODES: No enlarged glands  ENDOCRINE: No heat or cold intolerance; No hair loss  MUSCULOSKELETAL: No joint pain or swelling; No muscle, back, or extremity pain  PSYCHIATRIC: No depression, anxiety, mood swings, or difficulty sleeping  HEME/LYMPH: No easy bruising, or bleeding gums  ALLERGY AND IMMUNOLOGIC: No hives or eczema      PHYSICAL EXAM  GEN: FARHAT SLADE is a pleasant well-nourished, well developed 55y Female in no acute distress, alert awake, and oriented to person, place and time.   LE Focused:    Vasc: DP/PT palpable, CFT Brisk to digits x9, R hallux delayed, edema to R hallux  Derm: R hallux hyperkeratotic lesion with with underlying ulcer tracking the plantar surface of toe extending lateral, dorsal and proximal on the big toe, +malodor, +purulence, liquifactive necrosis present, Post debridement size ~4cm x 2cm x 0.5 cm  Neuro: protective sensation diminished     < from: Xray Toes, Right Foot (09.13.20 @ 20:02) >     EXAM:  TOE(S)-RIGHT FOOT                          PROCEDURE DATE:  09/13/2020          INTERPRETATION:  AP, lateral, and oblique views of the RIGHT first hallux are submitted.    Clinical data; trauma with pain.    There is diffuse soft tissue swelling of the first toe with subcutaneous and air collections on the plantar surface. Adjacent to distal and proximal first digit osseous structures show no gross of fracture or osteolytic lesion./  IMPRESSION: Diffuse soft tissue swelling with subcutaneous air. No fracture or gross osteolytic lesion.    If osteomyelitis is considered, despite conservative therapy, and soft tissue / bone infection requires further assessment, follow-up MRI recommended.        YIFAN PUENTES M.D., ATTENDING RADIOLOGIST  This document has been electronically signed. Sep 14 2020  1:48PM    < end of copied text >    Culture pending    A:  R hallux gas gangrene  R hallux ulceration    P:  Patient evaluated, chart reviewed  X-ray of toe reviewed as above  X-ray of foot ordered to r/o proximal gas accumulation  Extensive incision and drainage of right foot down to and including subcutaneous tissue using #10 blade  ~2-3cc of purulence noted with malodor, liquifactive necrosis  Opened up an pockets tracking proximally  Culture taken - sent to lab  Flushed with sterile saline  Dressed with Betadine/DSD/ACE  Ordered new x-ray  Patient last ate around noon  Keep NPO for now as may need to go to OR pending x-rays  WB as tolerated to R heel  Podiatry to follow while in house  Discussed with Dr. Yoo    Wound care instructions:  1. Apply betadine to 4x4 gauze and place over wound  2. Secure with Kerlix wrap and tape  3. Change Daily     Patient is a 55y old  Female who presents with a chief complaint of Sepsis 2/2 Infected R 1st digit DM Ulcer (14 Sep 2020 01:15)      HPI:  56 yo female with history of DM type 2, TIA, HLD, R hemicolectomy, primary anastomosis, ventral hernia repair with biologic mesh for strangulated ventral hernia who now presents with 'blood blister' on toe and sepsis with leukocytosis, tachycardia and low grade temperature. Patient reports she had 1 month ago removal of a wart on her right 1st toe with Dr. Mccollum podiatry, has not had issues with it since but yesterday noted that after stepping out of the shower she has blister on her toe. She called her podiatrist that night to make appointment and ended having nausea and vomiting. The following day she had enlargement of her toe and fever at home. She took tyelnol and came to the ED.    In the ED for N/V ED ordered CT abdomen that did not show obstruction but did show large ventral wall seroma. At this time she has no complaints of abdominal pain or further N/V. She is feeling better in the ED. She denies any other infectious symptomatology. Denies chest pain, SOB, cough. +fever/chills. She received 1 L of LR and vancomycin and zosyn.   (14 Sep 2020 01:15)    History as above. Pt seen bedside in NAD. States that she has a blood blister on her R big toe for several days and has gotten worse and more swollen. Adds she used to follow a podiatrist but has not seen for a few months. Came to ED for fever and nausea. Denies any pain to the foot.     PMH: Hypercholesteremia    TIA (transient ischemic attack)    DM (diabetes mellitus)      Allergies: Biaxin (Vomiting)    Medications: aspirin  chewable 81 milliGRAM(s) Oral daily  atorvastatin 20 milliGRAM(s) Oral at bedtime  dextrose 40% Gel 15 Gram(s) Oral once PRN  dextrose 5%. 1000 milliLiter(s) IV Continuous <Continuous>  dextrose 50% Injectable 12.5 Gram(s) IV Push once  dextrose 50% Injectable 25 Gram(s) IV Push once  dextrose 50% Injectable 25 Gram(s) IV Push once  glucagon  Injectable 1 milliGRAM(s) IntraMuscular once PRN  heparin   Injectable 5000 Unit(s) SubCutaneous every 8 hours  insulin lispro (HumaLOG) corrective regimen sliding scale   SubCutaneous three times a day before meals  insulin lispro (HumaLOG) corrective regimen sliding scale   SubCutaneous at bedtime  ondansetron Injectable 4 milliGRAM(s) IV Push every 6 hours PRN  piperacillin/tazobactam IVPB.. 3.375 Gram(s) IV Intermittent every 8 hours  vancomycin  IVPB 1000 milliGRAM(s) IV Intermittent every 12 hours    FH:No pertinent family history in first degree relatives      PSX: S/P right hemicolectomy    S/P repair of ventral hernia      Vital Signs Last 24 Hrs  T(C): 36.7 (14 Sep 2020 11:24), Max: 38.7 (14 Sep 2020 05:45)  T(F): 98.1 (14 Sep 2020 11:24), Max: 101.7 (14 Sep 2020 05:45)  HR: 96 (14 Sep 2020 11:24) (96 - 128)  BP: 130/80 (14 Sep 2020 11:24) (114/67 - 151/64)  BP(mean): --  RR: 18 (14 Sep 2020 11:24) (18 - 20)  SpO2: 96% (14 Sep 2020 11:24) (92% - 97%)    LABS                        12.0   16.13 )-----------( 110      ( 14 Sep 2020 02:25 )             35.6               09-14    WBC Count: 16.13 K/uL (09-14 @ 02:25)  WBC Count: 19.48 K/uL (09-13 @ 20:24)      131<L>  |  96<L>  |  18.0  ----------------------------<  150<H>  3.7   |  23.0  |  0.83    Ca    8.7      14 Sep 2020 02:25    TPro  6.9  /  Alb  3.4  /  TBili  0.8  /  DBili  x   /  AST  14  /  ALT  16  /  AlkPhos  165<H>  09-13      ROS  REVIEW OF SYSTEMS:    CONSTITUTIONAL: No fever, weight loss, or fatigue  EYES: No eye pain, visual disturbances, or discharge  ENMT:  No difficulty hearing, tinnitus, vertigo; No sinus or throat pain  NECK: No pain or stiffness  BREASTS: No pain, masses, or nipple discharge  RESPIRATORY: No cough, wheezing, chills or hemoptysis; No shortness of breath  CARDIOVASCULAR: No chest pain, palpitations, dizziness, or leg swelling  GASTROINTESTINAL: No abdominal or epigastric pain. No nausea, vomiting, or hematemesis; No diarrhea or constipation. No melena or hematochezia.  GENITOURINARY: No dysuria, frequency, hematuria, or incontinence  NEUROLOGICAL: No headaches, memory loss, loss of strength, numbness, or tremors  SKIN: R big toe blister with opening   LYMPH NODES: No enlarged glands  ENDOCRINE: No heat or cold intolerance; No hair loss  MUSCULOSKELETAL: No joint pain or swelling; No muscle, back, or extremity pain  PSYCHIATRIC: No depression, anxiety, mood swings, or difficulty sleeping  HEME/LYMPH: No easy bruising, or bleeding gums  ALLERGY AND IMMUNOLOGIC: No hives or eczema      PHYSICAL EXAM  GEN: FARHAT SLADE is a pleasant well-nourished, well developed 55y Female in no acute distress, alert awake, and oriented to person, place and time.   LE Focused:    Vasc: DP/PT palpable, CFT Brisk to digits x9, R hallux delayed, edema to R hallux  Derm: R hallux hyperkeratotic lesion with with underlying ulcer tracking the plantar surface of toe extending lateral, dorsal and proximal on the big toe, +malodor, +purulence, liquifactive necrosis present, Post debridement size ~4cm x 2cm x 0.5 cm  Neuro: protective sensation diminished     < from: Xray Toes, Right Foot (09.13.20 @ 20:02) >     EXAM:  TOE(S)-RIGHT FOOT                          PROCEDURE DATE:  09/13/2020          INTERPRETATION:  AP, lateral, and oblique views of the RIGHT first hallux are submitted.    Clinical data; trauma with pain.    There is diffuse soft tissue swelling of the first toe with subcutaneous and air collections on the plantar surface. Adjacent to distal and proximal first digit osseous structures show no gross of fracture or osteolytic lesion./  IMPRESSION: Diffuse soft tissue swelling with subcutaneous air. No fracture or gross osteolytic lesion.    If osteomyelitis is considered, despite conservative therapy, and soft tissue / bone infection requires further assessment, follow-up MRI recommended.        YIFAN PUENTES M.D., ATTENDING RADIOLOGIST  This document has been electronically signed. Sep 14 2020  1:48PM    < end of copied text >    Culture pending    A:  R hallux gas gangrene  R hallux ulceration    P:  Patient evaluated, chart reviewed  X-ray of toe reviewed as above  X-ray of foot ordered to r/o proximal gas accumulation  Extensive incision and drainage of right foot down to and including subcutaneous tissue using #10 blade  ~2-3cc of purulence noted with malodor, liquifactive necrosis  Opened up an pockets tracking proximally  Culture taken - sent to lab  Flushed with sterile saline  Dressed with Betadine/DSD/ACE  Ordered new x-ray  Patient last ate around noon  Request medical clearance, please chart in note  Keep NPO for now as may need to go to OR pending x-rays  WB as tolerated to R heel  Podiatry to follow while in house  Discussed with Dr. Yoo    Wound care instructions:  1. Apply betadine to 4x4 gauze and place over wound  2. Secure with Kerlix wrap and tape  3. Change Daily     Patient is a 55y old  Female who presents with a chief complaint of Sepsis 2/2 Infected R 1st digit DM Ulcer (14 Sep 2020 01:15)      HPI:  56 yo female with history of DM type 2, TIA, HLD, R hemicolectomy, primary anastomosis, ventral hernia repair with biologic mesh for strangulated ventral hernia who now presents with 'blood blister' on toe and sepsis with leukocytosis, tachycardia and low grade temperature. Patient reports she had 1 month ago removal of a wart on her right 1st toe with Dr. Mccollum podiatry, has not had issues with it since but yesterday noted that after stepping out of the shower she has blister on her toe. She called her podiatrist that night to make appointment and ended having nausea and vomiting. The following day she had enlargement of her toe and fever at home. She took tyelnol and came to the ED.    In the ED for N/V ED ordered CT abdomen that did not show obstruction but did show large ventral wall seroma. At this time she has no complaints of abdominal pain or further N/V. She is feeling better in the ED. She denies any other infectious symptomatology. Denies chest pain, SOB, cough. +fever/chills. She received 1 L of LR and vancomycin and zosyn.   (14 Sep 2020 01:15)    History as above. Pt seen bedside in NAD. States that she has a blood blister on her R big toe for several days and has gotten worse and more swollen. Adds she used to follow a podiatrist but has not seen for a few months. Came to ED for fever and nausea. Denies any pain to the foot.     PMH: Hypercholesteremia    TIA (transient ischemic attack)    DM (diabetes mellitus)      Allergies: Biaxin (Vomiting)    Medications: aspirin  chewable 81 milliGRAM(s) Oral daily  atorvastatin 20 milliGRAM(s) Oral at bedtime  dextrose 40% Gel 15 Gram(s) Oral once PRN  dextrose 5%. 1000 milliLiter(s) IV Continuous <Continuous>  dextrose 50% Injectable 12.5 Gram(s) IV Push once  dextrose 50% Injectable 25 Gram(s) IV Push once  dextrose 50% Injectable 25 Gram(s) IV Push once  glucagon  Injectable 1 milliGRAM(s) IntraMuscular once PRN  heparin   Injectable 5000 Unit(s) SubCutaneous every 8 hours  insulin lispro (HumaLOG) corrective regimen sliding scale   SubCutaneous three times a day before meals  insulin lispro (HumaLOG) corrective regimen sliding scale   SubCutaneous at bedtime  ondansetron Injectable 4 milliGRAM(s) IV Push every 6 hours PRN  piperacillin/tazobactam IVPB.. 3.375 Gram(s) IV Intermittent every 8 hours  vancomycin  IVPB 1000 milliGRAM(s) IV Intermittent every 12 hours    FH:No pertinent family history in first degree relatives      PSX: S/P right hemicolectomy    S/P repair of ventral hernia      Vital Signs Last 24 Hrs  T(C): 36.7 (14 Sep 2020 11:24), Max: 38.7 (14 Sep 2020 05:45)  T(F): 98.1 (14 Sep 2020 11:24), Max: 101.7 (14 Sep 2020 05:45)  HR: 96 (14 Sep 2020 11:24) (96 - 128)  BP: 130/80 (14 Sep 2020 11:24) (114/67 - 151/64)  BP(mean): --  RR: 18 (14 Sep 2020 11:24) (18 - 20)  SpO2: 96% (14 Sep 2020 11:24) (92% - 97%)    LABS                        12.0   16.13 )-----------( 110      ( 14 Sep 2020 02:25 )             35.6               09-14    WBC Count: 16.13 K/uL (09-14 @ 02:25)  WBC Count: 19.48 K/uL (09-13 @ 20:24)      131<L>  |  96<L>  |  18.0  ----------------------------<  150<H>  3.7   |  23.0  |  0.83    Ca    8.7      14 Sep 2020 02:25    TPro  6.9  /  Alb  3.4  /  TBili  0.8  /  DBili  x   /  AST  14  /  ALT  16  /  AlkPhos  165<H>  09-13      ROS  REVIEW OF SYSTEMS:    CONSTITUTIONAL: No fever, weight loss, or fatigue  EYES: No eye pain, visual disturbances, or discharge  ENMT:  No difficulty hearing, tinnitus, vertigo; No sinus or throat pain  NECK: No pain or stiffness  BREASTS: No pain, masses, or nipple discharge  RESPIRATORY: No cough, wheezing, chills or hemoptysis; No shortness of breath  CARDIOVASCULAR: No chest pain, palpitations, dizziness, or leg swelling  GASTROINTESTINAL: No abdominal or epigastric pain. No nausea, vomiting, or hematemesis; No diarrhea or constipation. No melena or hematochezia.  GENITOURINARY: No dysuria, frequency, hematuria, or incontinence  NEUROLOGICAL: No headaches, memory loss, loss of strength, numbness, or tremors  SKIN: R big toe blister with opening   LYMPH NODES: No enlarged glands  ENDOCRINE: No heat or cold intolerance; No hair loss  MUSCULOSKELETAL: No joint pain or swelling; No muscle, back, or extremity pain  PSYCHIATRIC: No depression, anxiety, mood swings, or difficulty sleeping  HEME/LYMPH: No easy bruising, or bleeding gums  ALLERGY AND IMMUNOLOGIC: No hives or eczema      PHYSICAL EXAM  GEN: FARHAT SLADE is a pleasant well-nourished, well developed 55y Female in no acute distress, alert awake, and oriented to person, place and time.   LE Focused:    Vasc: DP/PT palpable, CFT Brisk to digits x9, R hallux delayed, edema to R hallux  Derm: R hallux hyperkeratotic lesion with with underlying ulcer tracking the plantar surface of toe extending lateral, dorsal and proximal on the big toe, +malodor, +purulence, liquifactive necrosis present, Post debridement size ~4cm x 2cm x 0.5 cm  Neuro: protective sensation diminished     < from: Xray Toes, Right Foot (09.13.20 @ 20:02) >     EXAM:  TOE(S)-RIGHT FOOT                          PROCEDURE DATE:  09/13/2020          INTERPRETATION:  AP, lateral, and oblique views of the RIGHT first hallux are submitted.    Clinical data; trauma with pain.    There is diffuse soft tissue swelling of the first toe with subcutaneous and air collections on the plantar surface. Adjacent to distal and proximal first digit osseous structures show no gross of fracture or osteolytic lesion./  IMPRESSION: Diffuse soft tissue swelling with subcutaneous air. No fracture or gross osteolytic lesion.    If osteomyelitis is considered, despite conservative therapy, and soft tissue / bone infection requires further assessment, follow-up MRI recommended.        YIFAN PUENTES M.D., ATTENDING RADIOLOGIST  This document has been electronically signed. Sep 14 2020  1:48PM    < end of copied text >    Culture pending    A:  R hallux gas gangrene  R hallux ulceration    P:  Patient evaluated, chart reviewed  X-ray of toe reviewed as above  X-ray of foot ordered to r/o proximal gas accumulation  Extensive incision and drainage of right foot down to and including subcutaneous tissue using #10 blade  ~2-3cc of purulence noted with malodor, liquifactive necrosis  Opened up an pockets tracking proximally  Culture taken - sent to lab  Flushed with sterile saline  Dressed with Betadine/DSD/ACE  Ordered new x-ray - reviewed pending read\  MRI ordered - pending  Will need surgical intervention debridement vs amputation following results of MRI  Request medical clearance, please chart in note  WB as tolerated to R heel  Podiatry to follow while in house  Discussed with Dr. Yoo    Wound care instructions:  1. Apply betadine to 4x4 gauze and place over wound  2. Secure with Kerlix wrap and tape  3. Change Daily

## 2020-09-14 NOTE — H&P ADULT - NSHPREVIEWOFSYSTEMS_GEN_ALL_CORE
REVIEW OF SYSTEMS  General: denies weakness, malaise; +fever/chills  Skin/Breast: no new rash  Ophthalmologic: no change in vision  ENMT: no dysphagia, throat pain or change in hearing  Respiratory and Thorax: no difficulty breathing or chest pain  Cardiovascular: no palpitations or PND, orthopnea  Gastrointestinal: no abdominal pain, normal bowel movement, no dark stools  Genitourinary: no difficulty urinating, no burning urination  Musculoskeletal: no myalgia/arthrlagia  Neurological: no weakness, numbness, change in gait  Psychiatric: no depression, anxiety  Hematology/Lymphatics: denies easy bruising or bleeding  Endocrine: no polyuria, polydipsia

## 2020-09-14 NOTE — H&P ADULT - HISTORY OF PRESENT ILLNESS
54 yo female with history of DM type 2, TIA, HLD, R hemicolectomy, primary anastomosis, ventral hernia repair with biologic mesh for strangulated ventral hernia who now presents with 'blood blister' on toe and sepsis with leukocytosis, tachycardia and low grade temperature. Patient reports she had 1 month ago removal of a wart on her right 1st toe with Dr. Mccollum podiatry, has not had issues with it since but yesterday noted that after stepping out of the shower she has blister on her toe. She called her podiatrist that night to make appointment and ended having nausea and vomiting. The following day she had enlargement of her toe and fever at home. She took tyelnol and came to the ED.    In the ED for N/V ED ordered CT abdomen that did not show obstruction but did show large ventral wall seroma. At this time she has no complaints of abdominal pain or further N/V. She is feeling better in the ED. She denies any other infectious symptomatology. Denies chest pain, SOB, cough. +fever/chills. She received 1 L of LR and vancomycin and zosyn.

## 2020-09-14 NOTE — H&P ADULT - NSHPPHYSICALEXAM_GEN_ALL_CORE
Limited visual exam of the 1st right toe that did show swelling of the entire digit with bruising and serous drainage underneath the 1st toe; unable to assess otherwise for cuts or purulent drainage and limited to camera evaluation Vital Signs Last 24 Hrs  T(C): 38.7 (14 Sep 2020 05:45), Max: 38.7 (14 Sep 2020 05:45)  T(F): 101.7 (14 Sep 2020 05:45), Max: 101.7 (14 Sep 2020 05:45)  HR: 115 (14 Sep 2020 05:45) (106 - 128)  BP: 151/64 (14 Sep 2020 05:45) (114/67 - 151/64)  BP(mean): --  RR: 18 (14 Sep 2020 05:45) (18 - 20)  SpO2: 95% (14 Sep 2020 05:45) (93% - 97%)    Constitutional: Well-appearing, NAD, VSS  Head: NC/AT  Eyes: PERRL, EOMI, anicteric sclera, conjunctiva WNL  ENT: Normal Pharynx, MMM, No tonsillar exudate/erythema  Neck: Supple, Non-tender  Chest: Non-tender, no rashes  Cardio: RRR, s1/s2, no appreciable murmurs/rubs/gallops  Resp: BS CTA bilaterally, no wheezing/rhonchi/rales  Abd: Soft, Non-tender, Non-distended, no rebound/guarding/rigidity  MSK: moving all extremities, no motor weakness, full ROM x4  Psych: appropriate, cooperative  Neuro: CN II-XII grossly intact, no focal deficits  Skin: Warm/Dry.  Extremity: Limited visual exam of the 1st right toe that did show swelling of the entire digit with bruising and serous drainage underneath the 1st toe; unable to assess otherwise for cuts or purulent drainage and limited to camera evaluation

## 2020-09-14 NOTE — H&P ADULT - NSHPLABSRESULTS_GEN_ALL_CORE
12.9   19.48 )-----------( 173      ( 13 Sep 2020 20:24 )             37.0     09-13    128<L>  |  93<L>  |  21.0<H>  ----------------------------<  367<H>  3.6   |  20.0<L>  |  0.94    Ca    8.8      13 Sep 2020 20:24    TPro  6.9  /  Alb  3.4  /  TBili  0.8  /  DBili  x   /  AST  14  /  ALT  16  /  AlkPhos  165<H>  09-13    < from: CT Abdomen and Pelvis w/ IV Cont (09.13.20 @ 22:46) >    FINDINGS:  LOWER CHEST: Within normal limits.    LIVER: Within normal limits.  BILE DUCTS: Normal caliber.  GALLBLADDER: Cholelithiasis.  SPLEEN: Within normal limits.  PANCREAS: Within normal limits.  ADRENALS: Stable indeterminant 1.7 cm left adrenal nodule. Stable right adrenal gland thickening.  KIDNEYS/URETERS: Stable bilateral renal cysts. No hydronephrosis.    BLADDER: Within normal limits.  REPRODUCTIVE ORGANS: An 8.0 x 6.1 cm right ovarian cyst. Prior CT demonstrated a 3.2 cm right ovarian cyst. Uterus and left adnexa within normal limits.    BOWEL: No bowel obstruction. Status post right hemicolectomy.  PERITONEUM: No ascites.  VESSELS: Within normal limits.  RETROPERITONEUM/LYMPH NODES: No lymphadenopathy.  ABDOMINAL WALL: Ventral hernia mesh repair with a large anterior abdominal wall fluid collection adjacent to the mesh, measuring 18.6 x 12.5 x 5.2 cm (craniocaudad, transverse, AP) compatible with seroma.  BONES: Within normal limits.    IMPRESSION:  8 cm right ovarian cyst. Please note the size of the cyst can predispose the patient to ovarian torsion. Please correlate with patient's clinical symptoms. This lesion can image further with pelvic ultrasound.    Large anterior abdominal wall fluid collection adjacent to the hernia mesh compatible with a seroma.    < end of copied text >

## 2020-09-14 NOTE — CONSULT NOTE ADULT - SUBJECTIVE AND OBJECTIVE BOX
Trauma and Acute Care Surgery Consult Note    HPI:   55yFemale w/ PMH right hemicolectomy and ventral hernia repair presents w/ tachycardia and cellulitis of her foot. Patient incidentally found to have seroma near her ventral hernia repair. She denies any abdominal pain, nausea, vomiting, or change in her bowel habits.     PMH:  Hypercholesteremia  TIA (transient ischemic attack)  DM (diabetes mellitus)    PSH:  S/P right hemicolectomy  S/P repair of ventral hernia    Home Medications:  3 in 1 commode:   atorvastatin 20 mg oral tablet: 1 tab(s) orally once a day  glipiZIDE 10 mg oral tablet: 1 tab(s) orally once a day  lisinopril-hydrochlorothiazide 10 mg-12.5 mg oral tablet: 1 tab(s) orally once a day  metFORMIN 500 mg oral tablet: 1 tab(s) orally 2 times a day  Rolling Walker (5 inch wheels): 1 rolling walker with 5 inch wheels. Diagnosis and ICD 10 strangulated ventral hernia K43.6  shower chair:     ALL:  Biaxin (Vomiting)      FMH:  Denies family history of gastrointestinal malignancy     SOC Hx:   Denies etoh, tobacco, or drug use     Physical Exam:   Gen: well appearing female, NAD  Neuro: AOx3, EOMI, PERRLA, no gross deficit on exam  HEENT: No oral/mucosal lesions, no neck masses or lymphadenopathy  RESP: CTABL, nonlabored breathing  CVS: RRR,   GI: abd soft, NT, ND, no rebound/guarding, Incisions well healed.   Extremities: 2+ peripheral pulses

## 2020-09-14 NOTE — H&P ADULT - NSICDXPASTMEDICALHX_GEN_ALL_CORE_FT
PAST MEDICAL HISTORY:  DM (diabetes mellitus)     Hypercholesteremia     TIA (transient ischemic attack)

## 2020-09-14 NOTE — CONSULT NOTE ADULT - ATTENDING COMMENTS
CT reviewed  Seroma noted - Unlikely source of current complaint  No need for intervention or follow up

## 2020-09-14 NOTE — H&P ADULT - ASSESSMENT
56 yo female with history of DM type 2, TIA, HLD, R hemicolectomy, primary anastomosis, ventral hernia repair with biologic mesh for strangulated ventral hernia who now presents with 'blood blister' on toe and sepsis with leukocytosis, tachycardia and low grade temperature. Incidental finding of large seroma on CT abdomen    #sepsis secondary to 1st toe cellulitis on the right foot  - admit to tele for now  - continue vanc and zosyn at this time - received first dose in the ED  - consulted podiatry and ID  - if able would culture drainage from toe  - defer imaging to podiatry CT vs MRI    #seroma of the abdomen adjacent to the ventral hernia  - surgery to assess for any need for intervention  - at this time she is asymptomatic    #DM with hyperglycemia  - likely reactive to infection and unable to take medication today and last night  - check A1c  - will start on ISS for now  - assess need for long acting insulin/premeal based on fasting AM FS    #metabolic acidosis - should have contraction alkalosis from vomiting; denies diarrhea; no AG at this time and lactate normal  - administer fluids for now  - repeat in the AM    #hyponatremia- mild as corrected is 133 due to hyperglycemia  - fluids as above and repeat in the AM    #HTN  - restart BP meds as indicated  - on combination lisinopril/HCTZ at home    #DVT ppx  - heparin sq q8

## 2020-09-14 NOTE — CONSULT NOTE ADULT - ASSESSMENT
54yo female with incidental finding of seroma. No signs of infection or complication    - No surgical intervention indicated at this time.

## 2020-09-14 NOTE — CONSULT NOTE ADULT - ASSESSMENT
This 54 yo female with history of DM type 2, TIA, HLD, R hemicolectomy, primary anastomosis, ventral hernia repair with biologic mesh for strangulated ventral hernia who now presents with 'blood blister' on toe and sepsis with leukocytosis, tachycardia and low grade temperature. Patient reports she had 1 month ago removal of a wart on her right 1st toe with Dr. Mccollum podiatry, has not had issues with it since but yesterday noted that after stepping out of the shower she has blister on her toe. She called her podiatrist that night to make appointment and ended having nausea and vomiting. The following day she had enlargement of her toe and fever at home. She took tyelnol and came to the ED.    In the ED for N/V ED ordered CT abdomen that did not show obstruction but did show large ventral wall seroma. At this time she has no complaints of abdominal pain or further N/V. She is feeling better in the ED. She denies any other infectious symptomatology. Denies chest pain, SOB, cough. +fever/chills. She received 1 L of LR and vancomycin and zosyn.   (14 Sep 2020 01:15)    patient reports that she did not feel any pain until Sunday, and then the toe "Blew up"  patient had been seen by podiatry, s/p debridement of the RIGHT hallux  foul smelling per podiatry team.    Patient see's PA at office of Dr. Presley.    Impression:  - diabetes  - neuropathy  - right foot infection  - WBC elevation    Plan:  - continue zosyn and vancomycin  - check A1c on this patient    - WBC Elevated, will trend    - should obtain MRI r/o osteomyelitis    - ESR and CRP ordered for tomorrow

## 2020-09-14 NOTE — CHART NOTE - NSCHARTNOTEFT_GEN_A_CORE
pt is admitted by jurgenist  early am with fever suspected toe infection   d/w podiatry not consulted recalled today to evaluate   id consult is pending   cont current therapy     diet , ambulate as tolerate

## 2020-09-14 NOTE — CONSULT NOTE ADULT - SUBJECTIVE AND OBJECTIVE BOX
[facility]  INFECTIOUS DISEASES AND INTERNAL MEDICINE at [place]  =======================================================  Mitchell Brown MD  Diplomates American Board of Internal Medicine and Infectious Diseases  Tel  [phone]  Fax [phone]  =======================================================    [id number]  FARHAT SLADE   HPI: This 56 yo female with history of DM type 2, TIA, HLD, R hemicolectomy, primary anastomosis, ventral hernia repair with biologic mesh for strangulated ventral hernia who now presents with 'blood blister' on toe and sepsis with leukocytosis, tachycardia and low grade temperature. Patient reports she had 1 month ago removal of a wart on her right 1st toe with Dr. Mccollum podiatry, has not had issues with it since but yesterday noted that after stepping out of the shower she has blister on her toe. She called her podiatrist that night to make appointment and ended having nausea and vomiting. The following day she had enlargement of her toe and fever at home. She took tyelnol and came to the ED.    In the ED for N/V ED ordered CT abdomen that did not show obstruction but did show large ventral wall seroma. At this time she has no complaints of abdominal pain or further N/V. She is feeling better in the ED. She denies any other infectious symptomatology. Denies chest pain, SOB, cough. +fever/chills. She received 1 L of LR and vancomycin and zosyn.   (14 Sep 2020 01:15)    patient reports that she did not feel any pain until Sunday, and then the toe "Blew up"  patient had been seen by podiatry, s/p debridement of the RIGHT hallux  foul smelling per podiatry team.    Patient see's PA at office of Dr. Presley.    I have personally reviewed the labs and data; pertinent labs and data are listed in this note; please see below.   =======================================================  Past Medical & Surgical Hx:  =====================  PAST MEDICAL & SURGICAL HISTORY:  Hypercholesteremia  TIA (transient ischemic attack)  DM (diabetes mellitus)  S/P right hemicolectomy  S/P repair of ventral hernia    Problem List:  ==========  HEALTH ISSUES - PROBLEM Dx:      Social Hx:  =======  smokes less than half a pack per day    FAMILY HISTORY:  No family history heart disease in first degree relatives in mother of father  no significant family history of immunosuppressive disorders in mother or father    =======================================================  REVIEW OF SYSTEMS:  CONSTITUTIONAL:  No Fever or chills  HEENT:  No diplopia or blurred vision.  No earache, sore throat or runny nose.  CARDIOVASCULAR:  No pressure, squeezing, strangling, tightness, heaviness or aching about the chest, neck, axilla or epigastrium.  RESPIRATORY:  No cough, shortness of breath  GASTROINTESTINAL:  No nausea, vomiting or diarrhea.  GENITOURINARY:  No dysuria, frequency or urgency. No Blood in urine  MUSCULOSKELETAL:  no joint aches, no muscle pain  SKIN:  No change in skin, hair or nails.  NEUROLOGIC:  No Headaches, seizures or weakness.  PSYCHIATRIC:  No disorder of thought or mood.  ENDOCRINE:  No heat or cold intolerance  HEMATOLOGICAL:  No easy bruising or bleeding.   =======================================================    Allergies  Biaxin (Vomiting)      Antibiotics:  piperacillin/tazobactam IVPB.. 3.375 Gram(s) IV Intermittent every 8 hours  vancomycin  IVPB 1000 milliGRAM(s) IV Intermittent every 12 hours    Other medications:  aspirin  chewable 81 milliGRAM(s) Oral daily  atorvastatin 20 milliGRAM(s) Oral at bedtime  dextrose 5%. 1000 milliLiter(s) IV Continuous <Continuous>  dextrose 50% Injectable 12.5 Gram(s) IV Push once  dextrose 50% Injectable 25 Gram(s) IV Push once  dextrose 50% Injectable 25 Gram(s) IV Push once  heparin   Injectable 5000 Unit(s) SubCutaneous every 8 hours  insulin lispro (HumaLOG) corrective regimen sliding scale   SubCutaneous three times a day before meals  insulin lispro (HumaLOG) corrective regimen sliding scale   SubCutaneous at bedtime     piperacillin/tazobactam IVPB.   200 mL/Hr IV Intermittent (09-13-20 @ 20:42)   25 mL/Hr IV Intermittent (09-14-20 @ 05:39)   25 mL/Hr IV Intermittent (09-14-20 @ 13:45)    vancomycin  IVPB   300 mL/Hr IV Intermittent (09-13-20 @ 20:41)   250 mL/Hr IV Intermittent (09-14-20 @ 05:39)    ======================================================  Physical Exam:  ============  T(F): 98.1 (14 Sep 2020 11:24), Max: 101.7 (14 Sep 2020 05:45)  HR: 96 (14 Sep 2020 11:24)  BP: 130/80 (14 Sep 2020 11:24)  RR: 18 (14 Sep 2020 11:24)  SpO2: 96% (14 Sep 2020 11:24) (92% - 97%)  temp max in last 48H T(F): , Max: 101.7 (09-14-20 @ 05:45)Height (cm): 170.2 (09-13-20 @ 18:35)  Weight (kg): 104.3 (09-13-20 @ 18:35)  BMI (kg/m2): 36 (09-13-20 @ 18:35)  BSA (m2): 2.15 (09-13-20 @ 18:35)    General:  No acute distress.  Eye: Pupils are equal, round and reactive to light, Extraocular movements are intact, Normal conjunctiva.  HENT: Normocephalic, Oral mucosa is moist, No pharyngeal erythema, No sinus tenderness.  Neck: Supple, No lymphadenopathy.  Respiratory: Lungs are clear to auscultation, Respirations are non-labored.  Cardiovascular: Normal rate, Regular rhythm,   Gastrointestinal: Soft, Non-tender, Non-distended, Normal bowel sounds.  Genitourinary: No costovertebral angle tenderness.  Lymphatics: No lymphadenopathy neck,   Musculoskeletal: Normal range of motion, Normal strength.  Integumentary:  RIGHT HALLUX with enlargement and discoloration; debridement of plantar skin.  + foul smell  Neurologic: Alert, Oriented, No focal deficits, Cranial Nerves II-XII are grossly intact.  POOR SENSATION OF Pain on foot bilaterally.  Psychiatric: Appropriate mood & affect.    =======================================================  Labs:                        12.0   16.13 )-----------( 110      ( 14 Sep 2020 02:25 )             35.6      09-14    131<L>  |  96<L>  |  18.0  ----------------------------<  150<H>  3.7   |  23.0  |  0.83    Ca    8.7      14 Sep 2020 02:25    TPro  6.9  /  Alb  3.4  /  TBili  0.8  /  DBili  x   /  AST  14  /  ALT  16  /  AlkPhos  165<H>  09-13      Creatinine, Serum: 0.83 mg/dL (09-14-20 @ 02:25)  Creatinine, Serum: 0.94 mg/dL (09-13-20 @ 20:24)    COVID-19 PCR: NotDetec (09-13-20 @ 21:35)         < from: Xray Toes, Right Foot (09.13.20 @ 20:02) >   EXAM:  TOE(S)-RIGHT FOOT                          PROCEDURE DATE:  09/13/2020          INTERPRETATION:  AP, lateral, and oblique views of the RIGHT first hallux are submitted.    Clinical data; trauma with pain.    There is diffuse soft tissue swelling of the first toe with subcutaneous and air collections on the plantar surface. Adjacent to distal and proximal first digit osseous structures show no gross of fracture or osteolytic lesion./  IMPRESSION: Diffuse soft tissue swelling with subcutaneous air. No fracture or gross osteolytic lesion.    If osteomyelitis is considered, despite conservative therapy, and soft tissue / bone infection requires further assessment, follow-up MRI recommended.          YIFAN PUENTES M.D., ATTENDING RADIOLOGIST  This document has been electronically signed. Sep 14 2020  1:48PM    < end of copied text >

## 2020-09-15 LAB
A1C WITH ESTIMATED AVERAGE GLUCOSE RESULT: 10.1 % — HIGH (ref 4–5.6)
CULTURE RESULTS: NO GROWTH — SIGNIFICANT CHANGE UP
ERYTHROCYTE [SEDIMENTATION RATE] IN BLOOD: 48 MM/HR — HIGH (ref 0–20)
ESTIMATED AVERAGE GLUCOSE: 243 MG/DL — HIGH (ref 68–114)
GLUCOSE BLDC GLUCOMTR-MCNC: 182 MG/DL — HIGH (ref 70–99)
GLUCOSE BLDC GLUCOMTR-MCNC: 223 MG/DL — HIGH (ref 70–99)
GLUCOSE BLDC GLUCOMTR-MCNC: 258 MG/DL — HIGH (ref 70–99)
GLUCOSE BLDC GLUCOMTR-MCNC: 261 MG/DL — HIGH (ref 70–99)
HCT VFR BLD CALC: 36.1 % — SIGNIFICANT CHANGE UP (ref 34.5–45)
HCV AB S/CO SERPL IA: 0.07 S/CO — SIGNIFICANT CHANGE UP (ref 0–0.99)
HCV AB SERPL-IMP: SIGNIFICANT CHANGE UP
HGB BLD-MCNC: 12.1 G/DL — SIGNIFICANT CHANGE UP (ref 11.5–15.5)
MCHC RBC-ENTMCNC: 31.1 PG — SIGNIFICANT CHANGE UP (ref 27–34)
MCHC RBC-ENTMCNC: 33.5 GM/DL — SIGNIFICANT CHANGE UP (ref 32–36)
MCV RBC AUTO: 92.8 FL — SIGNIFICANT CHANGE UP (ref 80–100)
PLATELET # BLD AUTO: 157 K/UL — SIGNIFICANT CHANGE UP (ref 150–400)
RBC # BLD: 3.89 M/UL — SIGNIFICANT CHANGE UP (ref 3.8–5.2)
RBC # FLD: 12.8 % — SIGNIFICANT CHANGE UP (ref 10.3–14.5)
SPECIMEN SOURCE: SIGNIFICANT CHANGE UP
VANCOMYCIN TROUGH SERPL-MCNC: 9.5 UG/ML — LOW (ref 10–20)
WBC # BLD: 9.99 K/UL — SIGNIFICANT CHANGE UP (ref 3.8–10.5)
WBC # FLD AUTO: 9.99 K/UL — SIGNIFICANT CHANGE UP (ref 3.8–10.5)

## 2020-09-15 PROCEDURE — 99233 SBSQ HOSP IP/OBS HIGH 50: CPT

## 2020-09-15 PROCEDURE — 99232 SBSQ HOSP IP/OBS MODERATE 35: CPT

## 2020-09-15 RX ADMIN — Medication 4: at 16:27

## 2020-09-15 RX ADMIN — Medication 6: at 12:01

## 2020-09-15 RX ADMIN — HEPARIN SODIUM 5000 UNIT(S): 5000 INJECTION INTRAVENOUS; SUBCUTANEOUS at 12:01

## 2020-09-15 RX ADMIN — Medication 5 UNIT(S): at 16:27

## 2020-09-15 RX ADMIN — PIPERACILLIN AND TAZOBACTAM 25 GRAM(S): 4; .5 INJECTION, POWDER, LYOPHILIZED, FOR SOLUTION INTRAVENOUS at 12:01

## 2020-09-15 RX ADMIN — INSULIN GLARGINE 12 UNIT(S): 100 INJECTION, SOLUTION SUBCUTANEOUS at 21:12

## 2020-09-15 RX ADMIN — Medication 6: at 08:06

## 2020-09-15 RX ADMIN — Medication 250 MILLIGRAM(S): at 21:12

## 2020-09-15 RX ADMIN — Medication 5 UNIT(S): at 12:01

## 2020-09-15 RX ADMIN — LISINOPRIL 5 MILLIGRAM(S): 2.5 TABLET ORAL at 08:06

## 2020-09-15 RX ADMIN — HEPARIN SODIUM 5000 UNIT(S): 5000 INJECTION INTRAVENOUS; SUBCUTANEOUS at 07:08

## 2020-09-15 RX ADMIN — ATORVASTATIN CALCIUM 20 MILLIGRAM(S): 80 TABLET, FILM COATED ORAL at 21:12

## 2020-09-15 RX ADMIN — Medication 81 MILLIGRAM(S): at 08:06

## 2020-09-15 RX ADMIN — PIPERACILLIN AND TAZOBACTAM 25 GRAM(S): 4; .5 INJECTION, POWDER, LYOPHILIZED, FOR SOLUTION INTRAVENOUS at 22:57

## 2020-09-15 RX ADMIN — Medication 250 MILLIGRAM(S): at 10:21

## 2020-09-15 RX ADMIN — HEPARIN SODIUM 5000 UNIT(S): 5000 INJECTION INTRAVENOUS; SUBCUTANEOUS at 21:12

## 2020-09-15 RX ADMIN — PIPERACILLIN AND TAZOBACTAM 25 GRAM(S): 4; .5 INJECTION, POWDER, LYOPHILIZED, FOR SOLUTION INTRAVENOUS at 07:08

## 2020-09-15 RX ADMIN — Medication 5 UNIT(S): at 08:05

## 2020-09-15 NOTE — PROGRESS NOTE ADULT - SUBJECTIVE AND OBJECTIVE BOX
Internal Medicine Hospitalist Progress Note    follow up for toe infection suspected                 ROS: as above, all remaining ROS are negative.       BACKGROUND:  MEDICATIONS  (STANDING):  aspirin  chewable 81 milliGRAM(s) Oral daily  atorvastatin 20 milliGRAM(s) Oral at bedtime  dextrose 5%. 1000 milliLiter(s) (50 mL/Hr) IV Continuous <Continuous>  dextrose 50% Injectable 12.5 Gram(s) IV Push once  dextrose 50% Injectable 25 Gram(s) IV Push once  dextrose 50% Injectable 25 Gram(s) IV Push once  heparin   Injectable 5000 Unit(s) SubCutaneous every 8 hours  insulin glargine Injectable (LANTUS) 12 Unit(s) SubCutaneous at bedtime  insulin lispro (HumaLOG) corrective regimen sliding scale   SubCutaneous three times a day before meals  insulin lispro Injectable (HumaLOG) 5 Unit(s) SubCutaneous before breakfast  insulin lispro Injectable (HumaLOG) 5 Unit(s) SubCutaneous before lunch  insulin lispro Injectable (HumaLOG) 5 Unit(s) SubCutaneous before dinner  lisinopril 5 milliGRAM(s) Oral daily  piperacillin/tazobactam IVPB.. 3.375 Gram(s) IV Intermittent every 8 hours  vancomycin  IVPB 1000 milliGRAM(s) IV Intermittent every 12 hours    MEDICATIONS  (PRN):  dextrose 40% Gel 15 Gram(s) Oral once PRN Blood Glucose LESS THAN 70 milliGRAM(s)/deciliter  glucagon  Injectable 1 milliGRAM(s) IntraMuscular once PRN Glucose LESS THAN 70 milligrams/deciliter  ondansetron Injectable 4 milliGRAM(s) IV Push every 6 hours PRN Nausea and/or Vomiting    Allergies    Biaxin (Vomiting)    Intolerances            VITALS:  Vital Signs Last 24 Hrs  T(C): 36.7 (15 Sep 2020 11:17), Max: 37.1 (14 Sep 2020 15:36)  T(F): 98.1 (15 Sep 2020 11:17), Max: 98.8 (14 Sep 2020 21:17)  HR: 86 (15 Sep 2020 11:17) (86 - 99)  BP: 121/75 (15 Sep 2020 11:17) (118/68 - 159/75)  BP(mean): --  RR: 18 (15 Sep 2020 11:17) (18 - 18)  SpO2: 95% (15 Sep 2020 11:17) (93% - 97%) Daily     Daily   CAPILLARY BLOOD GLUCOSE      POCT Blood Glucose.: 261 mg/dL (15 Sep 2020 11:58)  POCT Blood Glucose.: 258 mg/dL (15 Sep 2020 08:02)  POCT Blood Glucose.: 307 mg/dL (14 Sep 2020 21:02)  POCT Blood Glucose.: 276 mg/dL (14 Sep 2020 16:35)    I&O's Summary      PHYSICAL EXAM:      Constitutional:    Eyes:    ENMT:    Neck:    Breasts:    Back:    Respiratory:    Cardiovascular:    Gastrointestinal:    Genitourinary:    Rectal:    Extremities:    Vascular:    Neurological:    Skin:    Lymph Nodes:    Musculoskeletal:    Psychiatric:          LABS:                        12.1   9.99  )-----------( 157      ( 15 Sep 2020 08:04 )             36.1     09-14    131<L>  |  96<L>  |  18.0  ----------------------------<  150<H>  3.7   |  23.0  |  0.83    Ca    8.7      14 Sep 2020 02:25    TPro  6.9  /  Alb  3.4  /  TBili  0.8  /  DBili  x   /  AST  14  /  ALT  16  /  AlkPhos  165<H>  09-13    PT/INR - ( 13 Sep 2020 20:24 )   PT: 14.5 sec;   INR: 1.26 ratio         PTT - ( 13 Sep 2020 20:24 )  PTT:28.6 sec    Radiology :           Internal Medicine Hospitalist Progress Note    follow up for toe infection suspected   pt is upset since podiatry  told her will need toe amputation   emotional support given             ROS: as above, all remaining ROS are negative.       BACKGROUND:  MEDICATIONS  (STANDING):  aspirin  chewable 81 milliGRAM(s) Oral daily  atorvastatin 20 milliGRAM(s) Oral at bedtime  dextrose 5%. 1000 milliLiter(s) (50 mL/Hr) IV Continuous <Continuous>  dextrose 50% Injectable 12.5 Gram(s) IV Push once  dextrose 50% Injectable 25 Gram(s) IV Push once  dextrose 50% Injectable 25 Gram(s) IV Push once  heparin   Injectable 5000 Unit(s) SubCutaneous every 8 hours  insulin glargine Injectable (LANTUS) 12 Unit(s) SubCutaneous at bedtime  insulin lispro (HumaLOG) corrective regimen sliding scale   SubCutaneous three times a day before meals  insulin lispro Injectable (HumaLOG) 5 Unit(s) SubCutaneous before breakfast  insulin lispro Injectable (HumaLOG) 5 Unit(s) SubCutaneous before lunch  insulin lispro Injectable (HumaLOG) 5 Unit(s) SubCutaneous before dinner  lisinopril 5 milliGRAM(s) Oral daily  piperacillin/tazobactam IVPB.. 3.375 Gram(s) IV Intermittent every 8 hours  vancomycin  IVPB 1000 milliGRAM(s) IV Intermittent every 12 hours    MEDICATIONS  (PRN):  dextrose 40% Gel 15 Gram(s) Oral once PRN Blood Glucose LESS THAN 70 milliGRAM(s)/deciliter  glucagon  Injectable 1 milliGRAM(s) IntraMuscular once PRN Glucose LESS THAN 70 milligrams/deciliter  ondansetron Injectable 4 milliGRAM(s) IV Push every 6 hours PRN Nausea and/or Vomiting    Allergies    Biaxin (Vomiting)    Intolerances            VITALS:  Vital Signs Last 24 Hrs  T(C): 36.7 (15 Sep 2020 11:17), Max: 37.1 (14 Sep 2020 15:36)  T(F): 98.1 (15 Sep 2020 11:17), Max: 98.8 (14 Sep 2020 21:17)  HR: 86 (15 Sep 2020 11:17) (86 - 99)  BP: 121/75 (15 Sep 2020 11:17) (118/68 - 159/75)  BP(mean): --  RR: 18 (15 Sep 2020 11:17) (18 - 18)  SpO2: 95% (15 Sep 2020 11:17) (93% - 97%) Daily     Daily   CAPILLARY BLOOD GLUCOSE      POCT Blood Glucose.: 261 mg/dL (15 Sep 2020 11:58)  POCT Blood Glucose.: 258 mg/dL (15 Sep 2020 08:02)  POCT Blood Glucose.: 307 mg/dL (14 Sep 2020 21:02)  POCT Blood Glucose.: 276 mg/dL (14 Sep 2020 16:35)    I&O's Summary      PHYSICAL EXAM:      Constitutional: awake alert , no distress     Neck: supple, no JVD     Respiratory: CTa bilateral     Cardiovascular: regular s1 /s2     Gastrointestinal: soft no tenderness , Bs positive     Extremities: no pretibial edema , right toe dressing in place     Psychiatric: anxious , normal affect           LABS:                        12.1   9.99  )-----------( 157      ( 15 Sep 2020 08:04 )             36.1     09-14    131<L>  |  96<L>  |  18.0  ----------------------------<  150<H>  3.7   |  23.0  |  0.83    Ca    8.7      14 Sep 2020 02:25    TPro  6.9  /  Alb  3.4  /  TBili  0.8  /  DBili  x   /  AST  14  /  ALT  16  /  AlkPhos  165<H>  09-13    PT/INR - ( 13 Sep 2020 20:24 )   PT: 14.5 sec;   INR: 1.26 ratio         PTT - ( 13 Sep 2020 20:24 )  PTT:28.6 sec    Radiology :

## 2020-09-15 NOTE — PROGRESS NOTE ADULT - ASSESSMENT
This 54 yo female with history of DM type 2, TIA, HLD, R hemicolectomy, primary anastomosis, ventral hernia repair with biologic mesh for strangulated ventral hernia who now presents with 'blood blister' on toe and sepsis with leukocytosis, tachycardia and low grade temperature. Patient reports she had 1 month ago removal of a wart on her right 1st toe with Dr. Mccollum podiatry, has not had issues with it since but yesterday noted that after stepping out of the shower she has blister on her toe. She called her podiatrist that night to make appointment and ended having nausea and vomiting. The following day she had enlargement of her toe and fever at home. She took tyelnol and came to the ED.    In the ED for N/V ED ordered CT abdomen that did not show obstruction but did show large ventral wall seroma. At this time she has no complaints of abdominal pain or further N/V. She is feeling better in the ED. She denies any other infectious symptomatology. Denies chest pain, SOB, cough. +fever/chills. She received 1 L of LR and vancomycin and zosyn.   (14 Sep 2020 01:15)    patient reports that she did not feel any pain until Sunday, and then the toe "Blew up"  patient had been seen by podiatry, s/p debridement of the RIGHT hallux  foul smelling per podiatry team.    Patient see's PA at office of Dr. Presley.    Impression:  - diabetes  - neuropathy  - right foot infection  - WBC elevation    Plan:  - continue Antibiotics:  piperacillin/tazobactam IVPB.. 3.375 Gram(s) IV Intermittent every 8 hours  vancomycin  IVPB 1000 milliGRAM(s) IV Intermittent every 12 hours      - check A1c on this patient    - WBC Elevated, will trend  improving    - pending MRI better evaluate extent of osteomyelitis; wound probes to bone    - ESR and CRP ordered

## 2020-09-15 NOTE — PROGRESS NOTE ADULT - ASSESSMENT
56 yo female with history of DM type 2, TIA, HLD, R hemicolectomy, primary anastomosis, ventral hernia repair with biologic mesh for strangulated ventral hernia who now presents with 'blood blister' on toe and sepsis with leukocytosis, tachycardia and low grade temperature. Patient reports she had 1 month ago removal of a wart on her right 1st toe with Dr. Mccollum podiatry, has not had issues with it since but yesterday noted that after stepping out of the shower she has blister on her toe. She called her podiatrist that night to make appointment and ended having nausea and vomiting. The following day she had enlargement of her toe and fever at home.  In the ED for N/V ED ordered CT abdomen that did not show obstruction but did show large ventral wall seroma, seen by surgery no sign of infection , +fever/chills. She received 1 L of LR and vancomycin and zosyn for supected toe infection , seen by podiatry s/p bedside debridment , ID on borad MR of foot to evaluate for OM     1- Diabetes mellitus poorly controlled with toe infection   suspected OM   cont iv abx , wound care debridment per podiatry   pain meds as needed   Pt need diabetic education , glucometer  and BG  monitoring on discharge   cont insulin adjust for better control   MR of foot r/o OM   sepsis on admission resolving   monitor blood cx , wound culture     2- HTN   cont lisinopril    controlled

## 2020-09-15 NOTE — PROGRESS NOTE ADULT - SUBJECTIVE AND OBJECTIVE BOX
Patient is a 55y old  Female who presents with a chief complaint of Sepsis 2/2 Infected R 1st digit DM Ulcer (14 Sep 2020 01:15)      HPI:  56 yo female with history of DM type 2, TIA, HLD, R hemicolectomy, primary anastomosis, ventral hernia repair with biologic mesh for strangulated ventral hernia who now presents with 'blood blister' on toe and sepsis with leukocytosis, tachycardia and low grade temperature. Patient reports she had 1 month ago removal of a wart on her right 1st toe with Dr. Mccollum podiatry, has not had issues with it since but yesterday noted that after stepping out of the shower she has blister on her toe. She called her podiatrist that night to make appointment and ended having nausea and vomiting. The following day she had enlargement of her toe and fever at home. She took tyelnol and came to the ED.    In the ED for N/V ED ordered CT abdomen that did not show obstruction but did show large ventral wall seroma. At this time she has no complaints of abdominal pain or further N/V. She is feeling better in the ED. She denies any other infectious symptomatology. Denies chest pain, SOB, cough. +fever/chills. She received 1 L of LR and vancomycin and zosyn.   (14 Sep 2020 01:15)    History as above. Pt seen bedside in NAD. States that she has a blood blister on her R big toe for several days and has gotten worse and more swollen. Adds she used to follow a podiatrist but has not seen for a few months. Came to ED for fever and nausea. Denies any pain to the foot.     Podiatry HPI: Patient seen bedside this morning in the ED with attending. No acute event overnight s/p extensive bedside debridement of Right great toe. Patient relates no pain, understand she will need surgery with amputation of the great toe. Denies f/c/n vomiting this AM. Feeling better compared to yesterday. WBC downtrending.     PMH: Hypercholesteremia    TIA (transient ischemic attack)    DM (diabetes mellitus)      Allergies: Biaxin (Vomiting)    MEDICATIONS  (STANDING):  aspirin  chewable 81 milliGRAM(s) Oral daily  atorvastatin 20 milliGRAM(s) Oral at bedtime  dextrose 5%. 1000 milliLiter(s) (50 mL/Hr) IV Continuous <Continuous>  dextrose 50% Injectable 12.5 Gram(s) IV Push once  dextrose 50% Injectable 25 Gram(s) IV Push once  dextrose 50% Injectable 25 Gram(s) IV Push once  heparin   Injectable 5000 Unit(s) SubCutaneous every 8 hours  insulin glargine Injectable (LANTUS) 12 Unit(s) SubCutaneous at bedtime  insulin lispro (HumaLOG) corrective regimen sliding scale   SubCutaneous three times a day before meals  insulin lispro Injectable (HumaLOG) 5 Unit(s) SubCutaneous before breakfast  insulin lispro Injectable (HumaLOG) 5 Unit(s) SubCutaneous before lunch  insulin lispro Injectable (HumaLOG) 5 Unit(s) SubCutaneous before dinner  lisinopril 5 milliGRAM(s) Oral daily  piperacillin/tazobactam IVPB.. 3.375 Gram(s) IV Intermittent every 8 hours  vancomycin  IVPB 1000 milliGRAM(s) IV Intermittent every 12 hours    MEDICATIONS  (PRN):  dextrose 40% Gel 15 Gram(s) Oral once PRN Blood Glucose LESS THAN 70 milliGRAM(s)/deciliter  glucagon  Injectable 1 milliGRAM(s) IntraMuscular once PRN Glucose LESS THAN 70 milligrams/deciliter  ondansetron Injectable 4 milliGRAM(s) IV Push every 6 hours PRN Nausea and/or Vomiting      FH:No pertinent family history in first degree relatives      PSX: S/P right hemicolectomy    S/P repair of ventral hernia                            12.1   9.99  )-----------( x        ( 15 Sep 2020 08:04 )             36.1       09-14    131<L>  |  96<L>  |  18.0  ----------------------------<  150<H>  3.7   |  23.0  |  0.83    Ca    8.7      14 Sep 2020 02:25    TPro  6.9  /  Alb  3.4  /  TBili  0.8  /  DBili  x   /  AST  14  /  ALT  16  /  AlkPhos  165<H>  09-13      Vital Signs Last 24 Hrs  T(C): 36.6 (15 Sep 2020 08:09), Max: 37.1 (14 Sep 2020 15:36)  T(F): 97.8 (15 Sep 2020 08:09), Max: 98.8 (14 Sep 2020 21:17)  HR: 94 (15 Sep 2020 08:09) (94 - 99)  BP: 159/75 (15 Sep 2020 08:09) (115/70 - 159/75)  BP(mean): --  RR: 18 (15 Sep 2020 08:09) (18 - 18)  SpO2: 97% (15 Sep 2020 08:09) (92% - 97%)        PHYSICAL EXAM  GEN: FARHAT SLADE is a pleasant well-nourished, well developed 55y Female in no acute distress, alert awake, and oriented to person, place and time.   LE Focused:    Vasc: DP/PT palpable, CFT Brisk to digits x9, R hallux delayed, edema to R hallux  Derm: R hallux wound measuring ~4cm x 2cm x 0.8cm, with fibronecrotic wound base and eschar to the wound edges, erythema edema noted to the great toe, malodorous, no purulence or discharge today, sloughing with liquifactive necrosis. Little soft tissue coverage to distal phalanx plantarly.  Neuro: protective sensation diminished   MSK: no gross deformity noted.     Image:    EXAM:  TOE(S)-RIGHT FOOT                          PROCEDURE DATE:  09/13/2020          INTERPRETATION:  AP, lateral, and oblique views of the RIGHT first hallux are submitted.    Clinical data; trauma with pain.    There is diffuse soft tissue swelling of the first toe with subcutaneous and air collections on the plantar surface. Adjacent to distal and proximal first digit osseous structures show no gross of fracture or osteolytic lesion./  IMPRESSION: Diffuse soft tissue swelling with subcutaneous air. No fracture or gross osteolytic lesion.    If osteomyelitis is considered, despite conservative therapy, and soft tissue / bone infection requires further assessment, follow-up MRI recommended.        YIFAN PUENTES M.D., ATTENDING RADIOLOGIST  This document has been electronically signed. Sep 14 2020  1:48PM    MRI pending    Culture pending    A:  R hallux gas gangrenen s/p bedside I&D 9/14  R hallux ulceration    P:  Patient evaluated, chart reviewed  X-ray of toe reviewed as above  X-ray of foot reviewed, no proxima gas   bedside debridement of right foot down to and including subcutaneous tissue using #15 blade with removal of necrotic noviable tissue  No purulence expressed today, extensive necrotic tissue little soft tissue bone coverage  Culture taken - sent to lab, f/u  Flushed with sterile saline  Dressed with Betadine/DSD/ACE  MRI - pending  Will need surgical intervention debridement vs amputation following results of MRI  Request medical clearance, please chart in note  Patient is scheduled for OR debridement and partial 1st ray amputation Thurs. 9/17 12:30pm  NPO aftermidnight 9/16  WB as tolerated to R heel  Podiatry to follow while in house  Discussed and seen with Dr. Guerrier    Wound care instructions:  1. Apply betadine to 4x4 gauze and place over wound  2. Secure with Kerlix wrap and tape  3. Change Daily

## 2020-09-16 ENCOUNTER — TRANSCRIPTION ENCOUNTER (OUTPATIENT)
Age: 55
End: 2020-09-16

## 2020-09-16 LAB
ANION GAP SERPL CALC-SCNC: 12 MMOL/L — SIGNIFICANT CHANGE UP (ref 5–17)
BUN SERPL-MCNC: 11 MG/DL — SIGNIFICANT CHANGE UP (ref 8–20)
CALCIUM SERPL-MCNC: 9.2 MG/DL — SIGNIFICANT CHANGE UP (ref 8.6–10.2)
CHLORIDE SERPL-SCNC: 101 MMOL/L — SIGNIFICANT CHANGE UP (ref 98–107)
CO2 SERPL-SCNC: 26 MMOL/L — SIGNIFICANT CHANGE UP (ref 22–29)
CREAT SERPL-MCNC: 0.64 MG/DL — SIGNIFICANT CHANGE UP (ref 0.5–1.3)
GLUCOSE BLDC GLUCOMTR-MCNC: 164 MG/DL — HIGH (ref 70–99)
GLUCOSE BLDC GLUCOMTR-MCNC: 261 MG/DL — HIGH (ref 70–99)
GLUCOSE BLDC GLUCOMTR-MCNC: 277 MG/DL — HIGH (ref 70–99)
GLUCOSE BLDC GLUCOMTR-MCNC: 392 MG/DL — HIGH (ref 70–99)
GLUCOSE SERPL-MCNC: 155 MG/DL — HIGH (ref 70–99)
HCT VFR BLD CALC: 39.3 % — SIGNIFICANT CHANGE UP (ref 34.5–45)
HGB BLD-MCNC: 13 G/DL — SIGNIFICANT CHANGE UP (ref 11.5–15.5)
MCHC RBC-ENTMCNC: 31.3 PG — SIGNIFICANT CHANGE UP (ref 27–34)
MCHC RBC-ENTMCNC: 33.1 GM/DL — SIGNIFICANT CHANGE UP (ref 32–36)
MCV RBC AUTO: 94.5 FL — SIGNIFICANT CHANGE UP (ref 80–100)
PLATELET # BLD AUTO: 252 K/UL — SIGNIFICANT CHANGE UP (ref 150–400)
POTASSIUM SERPL-MCNC: 4.2 MMOL/L — SIGNIFICANT CHANGE UP (ref 3.5–5.3)
POTASSIUM SERPL-SCNC: 4.2 MMOL/L — SIGNIFICANT CHANGE UP (ref 3.5–5.3)
RBC # BLD: 4.16 M/UL — SIGNIFICANT CHANGE UP (ref 3.8–5.2)
RBC # FLD: 12.5 % — SIGNIFICANT CHANGE UP (ref 10.3–14.5)
SODIUM SERPL-SCNC: 138 MMOL/L — SIGNIFICANT CHANGE UP (ref 135–145)
VANCOMYCIN TROUGH SERPL-MCNC: 11.5 UG/ML — SIGNIFICANT CHANGE UP (ref 10–20)
WBC # BLD: 9.06 K/UL — SIGNIFICANT CHANGE UP (ref 3.8–10.5)
WBC # FLD AUTO: 9.06 K/UL — SIGNIFICANT CHANGE UP (ref 3.8–10.5)

## 2020-09-16 PROCEDURE — 99233 SBSQ HOSP IP/OBS HIGH 50: CPT

## 2020-09-16 PROCEDURE — 73718 MRI LOWER EXTREMITY W/O DYE: CPT | Mod: 26,RT

## 2020-09-16 PROCEDURE — 99232 SBSQ HOSP IP/OBS MODERATE 35: CPT

## 2020-09-16 RX ORDER — INFLUENZA VIRUS VACCINE 15; 15; 15; 15 UG/.5ML; UG/.5ML; UG/.5ML; UG/.5ML
0.5 SUSPENSION INTRAMUSCULAR ONCE
Refills: 0 | Status: COMPLETED | OUTPATIENT
Start: 2020-09-16 | End: 2020-09-21

## 2020-09-16 RX ORDER — INSULIN GLARGINE 100 [IU]/ML
18 INJECTION, SOLUTION SUBCUTANEOUS AT BEDTIME
Refills: 0 | Status: DISCONTINUED | OUTPATIENT
Start: 2020-09-16 | End: 2020-09-17

## 2020-09-16 RX ORDER — SENNA PLUS 8.6 MG/1
1 TABLET ORAL ONCE
Refills: 0 | Status: COMPLETED | OUTPATIENT
Start: 2020-09-16 | End: 2020-09-16

## 2020-09-16 RX ORDER — MULTIVIT WITH MIN/MFOLATE/K2 340-15/3 G
1 POWDER (GRAM) ORAL ONCE
Refills: 0 | Status: DISCONTINUED | OUTPATIENT
Start: 2020-09-16 | End: 2020-09-17

## 2020-09-16 RX ORDER — SODIUM CHLORIDE 9 MG/ML
1000 INJECTION INTRAMUSCULAR; INTRAVENOUS; SUBCUTANEOUS
Refills: 0 | Status: DISCONTINUED | OUTPATIENT
Start: 2020-09-16 | End: 2020-09-17

## 2020-09-16 RX ORDER — SACCHAROMYCES BOULARDII 250 MG
250 POWDER IN PACKET (EA) ORAL
Refills: 0 | Status: DISCONTINUED | OUTPATIENT
Start: 2020-09-16 | End: 2020-09-17

## 2020-09-16 RX ORDER — INSULIN LISPRO 100/ML
7 VIAL (ML) SUBCUTANEOUS
Refills: 0 | Status: DISCONTINUED | OUTPATIENT
Start: 2020-09-16 | End: 2020-09-17

## 2020-09-16 RX ORDER — POLYETHYLENE GLYCOL 3350 17 G/17G
17 POWDER, FOR SOLUTION ORAL
Refills: 0 | Status: DISCONTINUED | OUTPATIENT
Start: 2020-09-16 | End: 2020-09-17

## 2020-09-16 RX ADMIN — Medication 250 MILLIGRAM(S): at 17:00

## 2020-09-16 RX ADMIN — POLYETHYLENE GLYCOL 3350 17 GRAM(S): 17 POWDER, FOR SOLUTION ORAL at 17:00

## 2020-09-16 RX ADMIN — INSULIN GLARGINE 18 UNIT(S): 100 INJECTION, SOLUTION SUBCUTANEOUS at 22:07

## 2020-09-16 RX ADMIN — SENNA PLUS 1 TABLET(S): 8.6 TABLET ORAL at 09:01

## 2020-09-16 RX ADMIN — SODIUM CHLORIDE 125 MILLILITER(S): 9 INJECTION INTRAMUSCULAR; INTRAVENOUS; SUBCUTANEOUS at 19:07

## 2020-09-16 RX ADMIN — Medication 7 UNIT(S): at 17:00

## 2020-09-16 NOTE — PROVIDER CONTACT NOTE (OTHER) - SITUATION
Pt blood sugar elevated at 277. PA contacted to question if she would like to put in orders for additional insulin. PA informed that PT is NPO after midnight for R-foot great toe amputation in AM

## 2020-09-16 NOTE — ADVANCED PRACTICE NURSE CONSULT - RECOMMEDATIONS
continue diabetes self management education  pt to inject all insulin doses while in the hospital using prefilled insulin syringe and rn supervision  insulin pen teaching  glucometer teaching  pls consider lantus 18 units qhs  and humalog 8 units before meals + moderate ss

## 2020-09-16 NOTE — PROGRESS NOTE ADULT - SUBJECTIVE AND OBJECTIVE BOX
Patient is a 55y old  Female who presents with a chief complaint of Sepsis 2/2 Infected R 1st digit DM Ulcer (14 Sep 2020 01:15)      HPI:  54 yo female with history of DM type 2, TIA, HLD, R hemicolectomy, primary anastomosis, ventral hernia repair with biologic mesh for strangulated ventral hernia who now presents with 'blood blister' on toe and sepsis with leukocytosis, tachycardia and low grade temperature. Patient reports she had 1 month ago removal of a wart on her right 1st toe with Dr. Mccollum podiatry, has not had issues with it since but yesterday noted that after stepping out of the shower she has blister on her toe. She called her podiatrist that night to make appointment and ended having nausea and vomiting. The following day she had enlargement of her toe and fever at home. She took tyelnol and came to the ED.    In the ED for N/V ED ordered CT abdomen that did not show obstruction but did show large ventral wall seroma. At this time she has no complaints of abdominal pain or further N/V. She is feeling better in the ED. She denies any other infectious symptomatology. Denies chest pain, SOB, cough. +fever/chills. She received 1 L of LR and vancomycin and zosyn.   (14 Sep 2020 01:15)    History as above. Pt seen bedside in NAD. States that she has a blood blister on her R big toe for several days and has gotten worse and more swollen. Adds she used to follow a podiatrist but has not seen for a few months. Came to ED for fever and nausea. Denies any pain to the foot.     Podiatry HPI: Patient seen bedside this morning. No acute event overnight s/p extensive bedside debridement of Right great toe. Patient relates no pain, understand she will need surgery with amputation of the great toe. Denies f/c/n vomiting this AM. Feeling better compared to yesterday. WBC downtrending.     PMH: Hypercholesteremia    TIA (transient ischemic attack)    DM (diabetes mellitus)      Allergies: Biaxin (Vomiting)    MEDICATIONS  (STANDING):  aspirin  chewable 81 milliGRAM(s) Oral daily  atorvastatin 20 milliGRAM(s) Oral at bedtime  dextrose 5%. 1000 milliLiter(s) (50 mL/Hr) IV Continuous <Continuous>  dextrose 50% Injectable 12.5 Gram(s) IV Push once  dextrose 50% Injectable 25 Gram(s) IV Push once  dextrose 50% Injectable 25 Gram(s) IV Push once  heparin   Injectable 5000 Unit(s) SubCutaneous every 8 hours  insulin glargine Injectable (LANTUS) 12 Unit(s) SubCutaneous at bedtime  insulin lispro (HumaLOG) corrective regimen sliding scale   SubCutaneous three times a day before meals  insulin lispro Injectable (HumaLOG) 5 Unit(s) SubCutaneous before breakfast  insulin lispro Injectable (HumaLOG) 5 Unit(s) SubCutaneous before lunch  insulin lispro Injectable (HumaLOG) 5 Unit(s) SubCutaneous before dinner  lisinopril 5 milliGRAM(s) Oral daily  piperacillin/tazobactam IVPB.. 3.375 Gram(s) IV Intermittent every 8 hours  vancomycin  IVPB 1000 milliGRAM(s) IV Intermittent every 12 hours    MEDICATIONS  (PRN):  dextrose 40% Gel 15 Gram(s) Oral once PRN Blood Glucose LESS THAN 70 milliGRAM(s)/deciliter  glucagon  Injectable 1 milliGRAM(s) IntraMuscular once PRN Glucose LESS THAN 70 milligrams/deciliter  ondansetron Injectable 4 milliGRAM(s) IV Push every 6 hours PRN Nausea and/or Vomiting      FH:No pertinent family history in first degree relatives      PSX: S/P right hemicolectomy    S/P repair of ventral hernia    Vital Signs Last 24 Hrs  T(C): 36.6 (16 Sep 2020 11:06), Max: 36.8 (15 Sep 2020 23:52)  T(F): 97.9 (16 Sep 2020 11:06), Max: 98.2 (15 Sep 2020 23:52)  HR: 77 (16 Sep 2020 11:06) (70 - 87)  BP: 131/81 (16 Sep 2020 11:06) (131/81 - 144/85)  BP(mean): 98 (16 Sep 2020 11:06) (98 - 98)  RR: 18 (16 Sep 2020 05:10) (18 - 20)  SpO2: 100% (16 Sep 2020 05:10) (96% - 100%)    LABS                                 12.1   9.99  )-----------( 157      ( 15 Sep 2020 08:04 )             36.1     WBC Count: 9.99 K/uL (09-15 @ 08:04)  WBC Count: 16.13 K/uL (09-14 @ 02:25)  WBC Count: 19.48 K/uL (09-13 @ 20:24)    Sedimentation Rate, Erythrocyte (09.15.20 @ 08:04)    Sedimentation Rate, Erythrocyte: 48 mm/hr          PHYSICAL EXAM  GEN: FARHAT SLADE is a pleasant well-nourished, well developed 55y Female in no acute distress, alert awake, and oriented to person, place and time.   LE Focused:    Vasc: DP/PT palpable, CFT Brisk to digits x9, R hallux delayed, edema to R hallux  Derm: R hallux wound measuring ~4cm x 2cm x 0.8cm, with fibronecrotic wound base and eschar to the wound edges, erythema edema noted to the great toe, malodorous, no purulence or discharge today, sloughing with liquifactive necrosis. Little soft tissue coverage to distal phalanx plantarly. distal tip non-viable  Neuro: protective sensation diminished   MSK: no gross deformity noted.     Image:    EXAM:  TOE(S)-RIGHT FOOT                          PROCEDURE DATE:  09/13/2020          INTERPRETATION:  AP, lateral, and oblique views of the RIGHT first hallux are submitted.    Clinical data; trauma with pain.    There is diffuse soft tissue swelling of the first toe with subcutaneous and air collections on the plantar surface. Adjacent to distal and proximal first digit osseous structures show no gross of fracture or osteolytic lesion./  IMPRESSION: Diffuse soft tissue swelling with subcutaneous air. No fracture or gross osteolytic lesion.    If osteomyelitis is considered, despite conservative therapy, and soft tissue / bone infection requires further assessment, follow-up MRI recommended.        YIFAN PUENTES M.D., ATTENDING RADIOLOGIST  This document has been electronically signed. Sep 14 2020  1:48PM    MRI pending read    Culture - Surgical Swab (09.14.20 @ 22:12)    Specimen Source: .Surgical Swab right hallux    Culture Results:   Numerous Streptococcus anginosus  Few Corynebacterium species  "Susceptibilities not performed"        A:  R hallux gas gangrenen s/p bedside I&D 9/14  R hallux ulceration    P:  Patient evaluated, chart reviewed  X-ray of toe reviewed as above  X-ray of foot reviewed, no proxima gas   No purulence expressed today, extensive necrotic tissue little soft tissue bone coverage  Culture taken - reviewed as above  Flushed with sterile saline  Dressed with Betadine/DSD/ACE  MRI - pending read  Request medical clearance, please chart in note  Patient is scheduled for OR debridement and partial 1st ray amputation tomorrow 9/17 12:30pm  NPO after midnight  WB as tolerated to R heel  Podiatry to follow while in house  Discussed and seen with Dr. Yoo     Wound care instructions:  1. Apply betadine to 4x4 gauze and place over wound  2. Secure with Kerlix wrap and tape  3. Change Daily

## 2020-09-16 NOTE — ADVANCED PRACTICE NURSE CONSULT - ASSESSMENT
went to see pt in afternoon. pt is a+ox3 c/o 0 pain family @ bedside providing comfort. pt states she was dx 3 yrs ago and was taking metforimin and glipizid. she does nt have a glucose meter and does nt check bg. pt was educated about diabetes disease process and the importance of bg monitoring. pt was advised to check bg before meals parameters provided. also educated about the importance of using insulin @ this tie ad the importance of euglycemic control in order to promote surgical wound healing. pt and family verbalized understanding. pt was educated about healthy food choices and  assisted her to make healthy choices for meals and snacks. pt was encouraged to perform rom while in the hospital to promote better glucose utilization. pt verbalized understanding. pt and  were educated about ss of hypoglycemia and treatment both verbalized understanding. pt has a fu appointment in dr. ramos's office on october 12, 2020 at 230pm.

## 2020-09-16 NOTE — PROGRESS NOTE ADULT - ASSESSMENT
This 54 yo female with history of DM type 2, TIA, HLD, R hemicolectomy, primary anastomosis, ventral hernia repair with biologic mesh for strangulated ventral hernia who now presents with 'blood blister' on toe and sepsis with leukocytosis, tachycardia and low grade temperature. Patient reports she had 1 month ago removal of a wart on her right 1st toe with Dr. Mccollum podiatry, has not had issues with it since but yesterday noted that after stepping out of the shower she has blister on her toe. She called her podiatrist that night to make appointment and ended having nausea and vomiting. The following day she had enlargement of her toe and fever at home. She took tyelnol and came to the ED.    In the ED for N/V ED ordered CT abdomen that did not show obstruction but did show large ventral wall seroma. At this time she has no complaints of abdominal pain or further N/V. She is feeling better in the ED. She denies any other infectious symptomatology. Denies chest pain, SOB, cough. +fever/chills. She received 1 L of LR and vancomycin and zosyn.   (14 Sep 2020 01:15)    patient reports that she did not feel any pain until Sunday, and then the toe "Blew up"  patient had been seen by podiatry, s/p debridement of the RIGHT hallux  foul smelling per podiatry team.    Patient see's PA at office of Dr. Presley.    Impression:  - diabetes  - neuropathy  - right foot infection  Right foot osteomyelitis  - WBC elevation    Plan:    - OM confirm in right hallux distal phalanx    surgical cx with Strep species    - continue Antibiotics:  piperacillin/tazobactam IVPB.. 3.375 Gram(s) IV Intermittent every 8 hours  - stop VANCOMYCIN      - A1c is elevated : on this patient  A1C with Estimated Average Glucose Result: 10.1 % (09-15-20 @ 08:04)  - will need good DM control      - WBC elevation from infection  - now resolved    - ESR and CRP ordered   Sedimentation Rate, Erythrocyte: 48 mm/hr (09-15-20 @ 08:04)        no contraindications to planned surgical resection of the infected digit from ID standpoint    please send OR cultures.

## 2020-09-16 NOTE — PROGRESS NOTE ADULT - SUBJECTIVE AND OBJECTIVE BOX
Anesthesia chart Review      55y Female    Biaxin (Vomiting)      Other sepsis    No pertinent family history in first degree relatives    Handoff    MEWS Score    Hypercholesteremia    TIA (transient ischemic attack)    DM (diabetes mellitus)    Sepsis due to other etiology    S/P right hemicolectomy    S/P repair of ventral hernia    RT FOOT SWOLLEN    90+    Toe infection    [email or web address]        HPI:  56 yo female with history of DM type 2, TIA, HLD, R hemicolectomy, primary anastomosis, ventral hernia repair with biologic mesh for strangulated ventral hernia who now presents with 'blood blister' on toe and sepsis with leukocytosis, tachycardia and low grade temperature. Patient scheduled for right foot partial 1st ray amputation   (14 Sep 2020 01:15)      PAST MEDICAL & SURGICAL HISTORY:  Hypercholesteremia    TIA (transient ischemic attack)    DM (diabetes mellitus)    S/P right hemicolectomy    S/P repair of ventral hernia        MEDICATIONS  (STANDING):  aspirin  chewable 81 milliGRAM(s) Oral daily  atorvastatin 20 milliGRAM(s) Oral at bedtime  dextrose 5%. 1000 milliLiter(s) (50 mL/Hr) IV Continuous <Continuous>  dextrose 50% Injectable 12.5 Gram(s) IV Push once  dextrose 50% Injectable 25 Gram(s) IV Push once  dextrose 50% Injectable 25 Gram(s) IV Push once  heparin   Injectable 5000 Unit(s) SubCutaneous every 8 hours  influenza   Vaccine 0.5 milliLiter(s) IntraMuscular once  insulin glargine Injectable (LANTUS) 12 Unit(s) SubCutaneous at bedtime  insulin lispro (HumaLOG) corrective regimen sliding scale   SubCutaneous three times a day before meals  insulin lispro Injectable (HumaLOG) 5 Unit(s) SubCutaneous before breakfast  insulin lispro Injectable (HumaLOG) 5 Unit(s) SubCutaneous before lunch  insulin lispro Injectable (HumaLOG) 5 Unit(s) SubCutaneous before dinner  lisinopril 5 milliGRAM(s) Oral daily  piperacillin/tazobactam IVPB.. 3.375 Gram(s) IV Intermittent every 8 hours  polyethylene glycol 3350 17 Gram(s) Oral two times a day  saccharomyces boulardii 250 milliGRAM(s) Oral two times a day    MEDICATIONS  (PRN):  dextrose 40% Gel 15 Gram(s) Oral once PRN Blood Glucose LESS THAN 70 milliGRAM(s)/deciliter  glucagon  Injectable 1 milliGRAM(s) IntraMuscular once PRN Glucose LESS THAN 70 milligrams/deciliter  magnesium citrate Oral Solution 1 Bottle Oral once PRN if no BM by 4 pm today  ondansetron Injectable 4 milliGRAM(s) IV Push every 6 hours PRN Nausea and/or Vomiting      Allergies    Biaxin (Vomiting)    Intolerances        SOCIAL HISTORY:    FAMILY HISTORY:  No pertinent family history in first degree relatives        Vital Signs Last 24 Hrs  T(C): 36.6 (16 Sep 2020 11:06), Max: 36.8 (15 Sep 2020 23:52)  T(F): 97.9 (16 Sep 2020 11:06), Max: 98.2 (15 Sep 2020 23:52)  HR: 77 (16 Sep 2020 11:06) (70 - 87)  BP: 131/81 (16 Sep 2020 11:06) (131/81 - 144/85)  BP(mean): 98 (16 Sep 2020 11:06) (98 - 98)  RR: 18 (16 Sep 2020 05:10) (18 - 20)  SpO2: 100% (16 Sep 2020 05:10) (96% - 100%)            LABS:                        12.1   9.99  )-----------( 157      ( 15 Sep 2020 08:04 )             36.1             RADIOLOGY & ADDITIONAL STUDIES:    Patient is an ASA class  3      plan Block with sedation versus GA, final  anesthetic plan per anesthesia team

## 2020-09-16 NOTE — PROGRESS NOTE ADULT - SUBJECTIVE AND OBJECTIVE BOX
[facility]  INFECTIOUS DISEASES AND INTERNAL MEDICINE at [place]  =======================================================  Mitchell Brown MD  Diplomates American Board of Internal Medicine and Infectious Diseases  Tel  [phone]  Fax [phone]  =======================================================    [id number]  FARHAT SLADE   follow up:   RIGHT hallux infection   s/p MRI of foot this AM    no signifciant pain  no fevers    =======================================================  REVIEW OF SYSTEMS:  CONSTITUTIONAL:  No Fever or chills  HEENT:  No diplopia or blurred vision.  No earache, sore throat or runny nose.  CARDIOVASCULAR:  No pressure, squeezing, strangling, tightness, heaviness or aching about the chest, neck, axilla or epigastrium.  RESPIRATORY:  No cough, shortness of breath  GASTROINTESTINAL:  No nausea, vomiting or diarrhea.  GENITOURINARY:  No dysuria, frequency or urgency. No Blood in urine  MUSCULOSKELETAL:  no joint aches, no muscle pain  SKIN:  No change in skin, hair or nails.  NEUROLOGIC:  No Headaches, seizures or weakness.  PSYCHIATRIC:  No disorder of thought or mood.  ENDOCRINE:  No heat or cold intolerance  HEMATOLOGICAL:  No easy bruising or bleeding.   =======================================================    Allergies  Biaxin (Vomiting)        ======================================================  Physical Exam:  ============     General:  No acute distress.  Eye: Pupils are equal, round and reactive to light, Extraocular movements are intact, Normal conjunctiva.  HENT: Normocephalic, Oral mucosa is moist, No pharyngeal erythema, No sinus tenderness.  Neck: Supple, No lymphadenopathy.  Respiratory: Lungs are clear to auscultation, Respirations are non-labored.  Cardiovascular: Normal rate, Regular rhythm,   Gastrointestinal: Soft, Non-tender, Non-distended, Normal bowel sounds.  Genitourinary: No costovertebral angle tenderness.  Lymphatics: No lymphadenopathy neck,   Musculoskeletal: Normal range of motion, Normal strength.  Integumentary:  RIGHT HALLUX with enlargement and discoloration; debridement of plantar skin.  + foul smell  Neurologic: Alert, Oriented, No focal deficits, Cranial Nerves II-XII are grossly intact.  POOR SENSATION OF Pain on foot bilaterally.  Psychiatric: Appropriate mood & affect.    =======================================================  Vitals:  ============  T(F): 97.9 (16 Sep 2020 11:06), Max: 98.2 (15 Sep 2020 23:52)  HR: 77 (16 Sep 2020 11:06)  BP: 131/81 (16 Sep 2020 11:06)  RR: 18 (16 Sep 2020 05:10)  SpO2: 100% (16 Sep 2020 05:10) (96% - 100%)  temp max in last 48H T(F): , Max: 98.8 (09-14-20 @ 21:17)    =======================================================  Current Antibiotics:  piperacillin/tazobactam IVPB.. 3.375 Gram(s) IV Intermittent every 8 hours    Other medications:  aspirin  chewable 81 milliGRAM(s) Oral daily  atorvastatin 20 milliGRAM(s) Oral at bedtime  dextrose 5%. 1000 milliLiter(s) IV Continuous <Continuous>  dextrose 50% Injectable 12.5 Gram(s) IV Push once  dextrose 50% Injectable 25 Gram(s) IV Push once  dextrose 50% Injectable 25 Gram(s) IV Push once  heparin   Injectable 5000 Unit(s) SubCutaneous every 8 hours  influenza   Vaccine 0.5 milliLiter(s) IntraMuscular once  insulin glargine Injectable (LANTUS) 12 Unit(s) SubCutaneous at bedtime  insulin lispro (HumaLOG) corrective regimen sliding scale   SubCutaneous three times a day before meals  insulin lispro Injectable (HumaLOG) 5 Unit(s) SubCutaneous before breakfast  insulin lispro Injectable (HumaLOG) 5 Unit(s) SubCutaneous before lunch  insulin lispro Injectable (HumaLOG) 5 Unit(s) SubCutaneous before dinner  lisinopril 5 milliGRAM(s) Oral daily  polyethylene glycol 3350 17 Gram(s) Oral two times a day  saccharomyces boulardii 250 milliGRAM(s) Oral two times a day      =======================================================  Labs:                        12.1   9.99  )-----------( 157      ( 15 Sep 2020 08:04 )             36.1              Culture - Surgical Swab (collected 09-14-20 @ 22:12)  Source: .Surgical Swab right hallux    Culture - Urine (collected 09-14-20 @ 08:14)  Source: .Urine Clean Catch (Midstream)  Final Report (09-15-20 @ 07:38):    No growth    Culture - Blood (collected 09-13-20 @ 21:18)  Source: .Blood Blood-Peripheral    Culture - Blood (collected 09-13-20 @ 20:26)  Source: .Blood Blood-Peripheral      Creatinine, Serum: 0.83 mg/dL (09-14-20 @ 02:25)  Creatinine, Serum: 0.94 mg/dL (09-13-20 @ 20:24)       WBC Count: 9.99 K/uL (09-15-20 @ 08:04)  WBC Count: 16.13 K/uL (09-14-20 @ 02:25)  WBC Count: 19.48 K/uL (09-13-20 @ 20:24)      COVID-19 PCR: NotDetec (09-13-20 @ 21:35)      Alkaline Phosphatase, Serum: 165 U/L (09-13-20 @ 20:24)  Alanine Aminotransferase (ALT/SGPT): 16 U/L (09-13-20 @ 20:24)  Aspartate Aminotransferase (AST/SGOT): 14 U/L (09-13-20 @ 20:24)  Bilirubin Total, Serum: 0.8 mg/dL (09-13-20 @ 20:24)     < from: MR Foot No Cont, Right (09.16.20 @ 10:28) >     EXAM:  MR FOOT RT                          PROCEDURE DATE:  09/16/2020          INTERPRETATION:  Clinical indication: Toe swelling and discoloration with an open wound.    Multiplanar multisequence MRI of the right foot was performed from the level of the toes to the level of the talonavicular joint without intravenous contrast.    FINDINGS:    There is marked maceration and deficiency of the soft tissues along the medial plantar aspect of the first toe extending from the distal phalanx to theinterphalangeal joint. There appears to be exposure of the plantar aspect of the first distal phalanx. There is mild subcortical osseous edema involving the distal phalanx of the first toe with slightly decreased subcortical T1 marrow signal concerning for early osteomyelitis. There is diffuse cellulitis throughout the first toe which extends along the dorsal aspect of the foot. There is small fluid within the first toe flexor tendon sheath extending to the level of the midfoot consistent with mild tenosynovitis.    There is osseous edema centered about the second and third tarsometatarsal articulations consistent with osseous stress reaction. Osseous stress reaction about the second tarsometatarsal articulation extends from the base of the second metatarsal to the mid shaft. These findings are seen in the setting of moderate to severe arthrosis. There is no evidence of acute fracture.    There is mild hallux valgus with moderate first metatarsophalangeal and hallux sesamoid joint arthrosis. There is prominent subchondral cystic change along the plantar aspect of the first metatarsal head. There is a nonspecific moderate first metatarsal phalangeal joint effusion. Correlate clinically to exclude superimposed infection.    There is edema and fatty atrophy throughout the plantar musculature consistent with denervation related changes. No acute tendinous injury is demonstrated. The Lisfranc ligament is preserved.    IMPRESSION:    Extensive maceration and deficiency of the soft tissues about the medial plantar aspect of the first toe. Findings suggestive of early subcortical osteomyelitis along the plantar aspect of the distal phalanx.    Mild tenosynovitis within the first toe flexor tendon.    Moderate first metatarsal phalangeal joint arthrosis with prominent subchondral cystic change along the plantar aspect of the first metatarsal head. Nonspecific moderate first metatarsal phalangeal joint effusion. Correlate clinically to this exclude superimposed infection.    Prominent osseous stress reaction centered about the second and third tarsometatarsal articulations.         TANIKA HEIN M.D., ATTENDING RADIOLOGIST  This document has been electronically signed. Sep 16 2020 10:52AM    < end of copied text >

## 2020-09-16 NOTE — PROGRESS NOTE ADULT - ASSESSMENT
Assessment	  54 yo female with history of DM type 2, TIA, HLD, R hemicolectomy, primary anastomosis, ventral hernia repair with biologic mesh for strangulated ventral hernia who now presents with 'blood blister' on toe and sepsis with leukocytosis, tachycardia and low grade temperature. Patient reports she had 1 month ago removal of a wart on her right 1st toe with Dr. Mccollum podiatry, has not had issues with it since but yesterday noted that after stepping out of the shower she has blister on her toe. She called her podiatrist that night to make appointment and ended having nausea and vomiting. The following day she had enlargement of her toe and fever at home.  In the ED for N/V ED ordered CT abdomen that did not show obstruction but did show large ventral wall seroma, seen by surgery no sign of infection , +fever/chills. She received 1 L of LR and vancomycin and zosyn for suspected toe infection , seen by podiatry s/p bedside debridement , ID on board MRI  of foot to evaluate for OM, is currently on vanco/zozin, states no bowel movement for past 4 days, abd soft, bowl regimen ordered.        1. Problem  Diabetes mellitus poorly controlled with toe infection   suspected OM   cont iv abx , ID following continue Zosyn with NS wound care debridement per podiatry   pain meds as needed   Pt need diabetic education , glucometer  and BG  monitoring on discharge   cont insulin adjust for better control   MRI  of foot r/o OM ordered follow for results   sepsis on admission resolving   monitor blood cx , wound culture resulted on ABO    2- Problem - HTN   cont lisinopril    controlled     3- Constipation    No bowel movent per 4 days  Started Miralax  PRN order for Magnesium Citrate will   encourage ambulation  Encourage hydration       Assessment	  56 yo female with history of DM type 2, TIA, HLD, R hemicolectomy, primary anastomosis, ventral hernia repair with biologic mesh for strangulated ventral hernia who now presents with 'blood blister' on toe and sepsis with leukocytosis, tachycardia and low grade temperature. Patient reports she had 1 month ago removal of a wart on her right 1st toe with Dr. Mccollum podiatry, has not had issues with it since but yesterday noted that after stepping out of the shower she has blister on her toe. She called her podiatrist that night to make appointment and ended having nausea and vomiting. The following day she had enlargement of her toe and fever at home.  In the ED for N/V ED ordered CT abdomen that did not show obstruction but did show large ventral wall seroma, seen by surgery no sign of infection , +fever/chills. She received 1 L of LR and vancomycin and zosyn for suspected toe infection , seen by podiatry s/p bedside debridement , ID on board MRI  of foot to evaluate for OM, is currently on vanco/zozyn states no bowel movement for past 4 days, abd soft, bowl regimen ordered.        1.   Right great toe infection with suspected acute OM    MR result reviewed   Id and podiatry input appreciated      continue Zosyn with NS wound care debridement per podiatry   BG control     2- Diabetes Mellitus type 2 poorly controlled   cont lantus , insulin , BG monitoring   needs education for glucometer use and BG on discharge     3- Constipation   added laxative , stool softener   if no BM by later today will give mag citrate     4- HTN   cont lisinopril    controlled

## 2020-09-16 NOTE — PROGRESS NOTE ADULT - SUBJECTIVE AND OBJECTIVE BOX
follow up for toe infection suspected   pt is upset since podiatry  told her will need toe amputation today states she is ok with plan      ROS: as above, all remaining ROS are negative.     BACKGROUND:  MEDICATIONS  (STANDING):  aspirin  chewable 81 milliGRAM(s) Oral daily  atorvastatin 20 milliGRAM(s) Oral at bedtime  dextrose 5%. 1000 milliLiter(s) (50 mL/Hr) IV Continuous <Continuous>  dextrose 50% Injectable 12.5 Gram(s) IV Push once  dextrose 50% Injectable 25 Gram(s) IV Push once  dextrose 50% Injectable 25 Gram(s) IV Push once  heparin   Injectable 5000 Unit(s) SubCutaneous every 8 hours  insulin glargine Injectable (LANTUS) 12 Unit(s) SubCutaneous at bedtime  insulin lispro (HumaLOG) corrective regimen sliding scale   SubCutaneous three times a day before meals  insulin lispro Injectable (HumaLOG) 5 Unit(s) SubCutaneous before breakfast  insulin lispro Injectable (HumaLOG) 5 Unit(s) SubCutaneous before lunch  insulin lispro Injectable (HumaLOG) 5 Unit(s) SubCutaneous before dinner  lisinopril 5 milliGRAM(s) Oral daily  piperacillin/tazobactam IVPB.. 3.375 Gram(s) IV Intermittent every 8 hours  vancomycin  IVPB 1000 milliGRAM(s) IV Intermittent every 12 hours    MEDICATIONS  (PRN):  dextrose 40% Gel 15 Gram(s) Oral once PRN Blood Glucose LESS THAN 70 milliGRAM(s)/deciliter  glucagon  Injectable 1 milliGRAM(s) IntraMuscular once PRN Glucose LESS THAN 70 milligrams/deciliter  ondansetron Injectable 4 milliGRAM(s) IV Push every 6 hours PRN Nausea and/or Vomiting    Allergies    Biaxin (Vomiting)    Intolerances            VITALS:  Vital Signs Last 24 Hrs  T(C): 36.7 (15 Sep 2020 11:17), Max: 37.1 (14 Sep 2020 15:36)  T(F): 98.1 (15 Sep 2020 11:17), Max: 98.8 (14 Sep 2020 21:17)  HR: 86 (15 Sep 2020 11:17) (86 - 99)  BP: 121/75 (15 Sep 2020 11:17) (118/68 - 159/75)  BP(mean): --  RR: 18 (15 Sep 2020 11:17) (18 - 18)  SpO2: 95% (15 Sep 2020 11:17) (93% - 97%RA) Daily          PHYSICAL EXAM:      Constitutional: awake alert , no distress, tearful      Neck: supple, no JVD     Respiratory: CTa bilateral     Cardiovascular: regular s1 /s2     Gastrointestinal:  obese , soft no tenderness,     Extremities: no pretibial edema , right toe dressing in place,      Psychiatric: anxious , normal affect       LABS:                          12.1   9.99  )-----------( 157      ( 15 Sep 2020 08:04 )             36.1       CAPILLARY BLOOD GLUCOSE      POCT Blood Glucose.: 392 mg/dL (16 Sep 2020 07:59)  POCT Blood Glucose.: 182 mg/dL (15 Sep 2020 21:07)  POCT Blood Glucose.: 223 mg/dL (15 Sep 2020 16:26)  POCT Blood Glucose.: 261 mg/dL (15 Sep 2020 11:58)                       follow up for toe infection , Diabetes Mellitus   pt is seen in am , she is feeling better , + constipation for 4-5 days no BM   denies abd pain or distention   NP Jasvir is at the bedside     ROS: as above, all remaining ROS are negative.     BACKGROUND:  MEDICATIONS  (STANDING):  aspirin  chewable 81 milliGRAM(s) Oral daily  atorvastatin 20 milliGRAM(s) Oral at bedtime  dextrose 5%. 1000 milliLiter(s) (50 mL/Hr) IV Continuous <Continuous>  dextrose 50% Injectable 12.5 Gram(s) IV Push once  dextrose 50% Injectable 25 Gram(s) IV Push once  dextrose 50% Injectable 25 Gram(s) IV Push once  heparin   Injectable 5000 Unit(s) SubCutaneous every 8 hours  insulin glargine Injectable (LANTUS) 12 Unit(s) SubCutaneous at bedtime  insulin lispro (HumaLOG) corrective regimen sliding scale   SubCutaneous three times a day before meals  insulin lispro Injectable (HumaLOG) 5 Unit(s) SubCutaneous before breakfast  insulin lispro Injectable (HumaLOG) 5 Unit(s) SubCutaneous before lunch  insulin lispro Injectable (HumaLOG) 5 Unit(s) SubCutaneous before dinner  lisinopril 5 milliGRAM(s) Oral daily  piperacillin/tazobactam IVPB.. 3.375 Gram(s) IV Intermittent every 8 hours  vancomycin  IVPB 1000 milliGRAM(s) IV Intermittent every 12 hours    MEDICATIONS  (PRN):  dextrose 40% Gel 15 Gram(s) Oral once PRN Blood Glucose LESS THAN 70 milliGRAM(s)/deciliter  glucagon  Injectable 1 milliGRAM(s) IntraMuscular once PRN Glucose LESS THAN 70 milligrams/deciliter  ondansetron Injectable 4 milliGRAM(s) IV Push every 6 hours PRN Nausea and/or Vomiting    Allergies    Biaxin (Vomiting)    Intolerances            VITALS:  Vital Signs Last 24 Hrs  T(C): 36.7 (15 Sep 2020 11:17), Max: 37.1 (14 Sep 2020 15:36)  T(F): 98.1 (15 Sep 2020 11:17), Max: 98.8 (14 Sep 2020 21:17)  HR: 86 (15 Sep 2020 11:17) (86 - 99)  BP: 121/75 (15 Sep 2020 11:17) (118/68 - 159/75)  BP(mean): --  RR: 18 (15 Sep 2020 11:17) (18 - 18)  SpO2: 95% (15 Sep 2020 11:17) (93% - 97%RA) Daily          PHYSICAL EXAM:      Constitutional: awake alert , no distress, tearful      Neck: supple, no JVD     Respiratory: CTa bilateral     Cardiovascular: regular s1 /s2     Gastrointestinal:  obese , soft , no distention no tenderness,     Extremities: no pretibial edema , right toe dressing in place,        CAPILLARY BLOOD GLUCOSE      POCT Blood Glucose.: 261 mg/dL (16 Sep 2020 12:25)  POCT Blood Glucose.: 392 mg/dL (16 Sep 2020 07:59)  POCT Blood Glucose.: 182 mg/dL (15 Sep 2020 21:07)  POCT Blood Glucose.: 223 mg/dL (15 Sep 2020 16:26)                        12.1   9.99  )-----------( 157      ( 15 Sep 2020 08:04 )             36.1

## 2020-09-16 NOTE — PROGRESS NOTE ADULT - SUBJECTIVE AND OBJECTIVE BOX
In short, 55 year old female w/ HTN, DMII, HLD, hx TIA w/o residual deficit, s/p right hemicolectomy, s/p ventral hernia repair or strangulated ventral hernia now w/ sepsis secondary to 1st toe suspected osteo on right foot to undergo right foot partial amputation (scheduled 9/17/20).    Most recent labs from 9/15/20 reviewed. Of note, POCT glucose (9/16/20) 392.     Will review most recent labs when available. Patient should have glucose control (< 180) for procedure.

## 2020-09-17 ENCOUNTER — RESULT REVIEW (OUTPATIENT)
Age: 55
End: 2020-09-17

## 2020-09-17 LAB
-  AMPICILLIN/SULBACTAM: SIGNIFICANT CHANGE UP
-  CEFAZOLIN: SIGNIFICANT CHANGE UP
-  CLINDAMYCIN: SIGNIFICANT CHANGE UP
-  ERYTHROMYCIN: SIGNIFICANT CHANGE UP
-  GENTAMICIN: SIGNIFICANT CHANGE UP
-  OXACILLIN: SIGNIFICANT CHANGE UP
-  RIFAMPIN: SIGNIFICANT CHANGE UP
-  TETRACYCLINE: SIGNIFICANT CHANGE UP
-  TRIMETHOPRIM/SULFAMETHOXAZOLE: SIGNIFICANT CHANGE UP
-  VANCOMYCIN: SIGNIFICANT CHANGE UP
GLUCOSE BLDC GLUCOMTR-MCNC: 182 MG/DL — HIGH (ref 70–99)
GLUCOSE BLDC GLUCOMTR-MCNC: 198 MG/DL — HIGH (ref 70–99)
GLUCOSE BLDC GLUCOMTR-MCNC: 298 MG/DL — HIGH (ref 70–99)
METHOD TYPE: SIGNIFICANT CHANGE UP

## 2020-09-17 PROCEDURE — 88305 TISSUE EXAM BY PATHOLOGIST: CPT | Mod: 26

## 2020-09-17 PROCEDURE — 73630 X-RAY EXAM OF FOOT: CPT | Mod: 26,RT

## 2020-09-17 PROCEDURE — 99232 SBSQ HOSP IP/OBS MODERATE 35: CPT

## 2020-09-17 PROCEDURE — 88311 DECALCIFY TISSUE: CPT | Mod: 26

## 2020-09-17 RX ORDER — METOCLOPRAMIDE HCL 10 MG
10 TABLET ORAL ONCE
Refills: 0 | Status: COMPLETED | OUTPATIENT
Start: 2020-09-17 | End: 2020-09-17

## 2020-09-17 RX ORDER — ATORVASTATIN CALCIUM 80 MG/1
20 TABLET, FILM COATED ORAL AT BEDTIME
Refills: 0 | Status: DISCONTINUED | OUTPATIENT
Start: 2020-09-17 | End: 2020-09-21

## 2020-09-17 RX ORDER — ONDANSETRON 8 MG/1
4 TABLET, FILM COATED ORAL EVERY 6 HOURS
Refills: 0 | Status: DISCONTINUED | OUTPATIENT
Start: 2020-09-17 | End: 2020-09-21

## 2020-09-17 RX ORDER — INSULIN LISPRO 100/ML
10 VIAL (ML) SUBCUTANEOUS
Refills: 0 | Status: DISCONTINUED | OUTPATIENT
Start: 2020-09-17 | End: 2020-09-21

## 2020-09-17 RX ORDER — LISINOPRIL 2.5 MG/1
5 TABLET ORAL DAILY
Refills: 0 | Status: DISCONTINUED | OUTPATIENT
Start: 2020-09-17 | End: 2020-09-20

## 2020-09-17 RX ORDER — DEXTROSE 50 % IN WATER 50 %
15 SYRINGE (ML) INTRAVENOUS ONCE
Refills: 0 | Status: DISCONTINUED | OUTPATIENT
Start: 2020-09-17 | End: 2020-09-21

## 2020-09-17 RX ORDER — INSULIN GLARGINE 100 [IU]/ML
20 INJECTION, SOLUTION SUBCUTANEOUS AT BEDTIME
Refills: 0 | Status: DISCONTINUED | OUTPATIENT
Start: 2020-09-17 | End: 2020-09-18

## 2020-09-17 RX ORDER — SACCHAROMYCES BOULARDII 250 MG
250 POWDER IN PACKET (EA) ORAL
Refills: 0 | Status: DISCONTINUED | OUTPATIENT
Start: 2020-09-17 | End: 2020-09-18

## 2020-09-17 RX ORDER — ONDANSETRON 8 MG/1
4 TABLET, FILM COATED ORAL ONCE
Refills: 0 | Status: COMPLETED | OUTPATIENT
Start: 2020-09-17 | End: 2020-09-17

## 2020-09-17 RX ORDER — LISINOPRIL 2.5 MG/1
10 TABLET ORAL DAILY
Refills: 0 | Status: CANCELLED | OUTPATIENT
Start: 2020-09-18 | End: 2020-09-17

## 2020-09-17 RX ORDER — PIPERACILLIN AND TAZOBACTAM 4; .5 G/20ML; G/20ML
3.38 INJECTION, POWDER, LYOPHILIZED, FOR SOLUTION INTRAVENOUS EVERY 8 HOURS
Refills: 0 | Status: DISCONTINUED | OUTPATIENT
Start: 2020-09-17 | End: 2020-09-18

## 2020-09-17 RX ORDER — INSULIN GLARGINE 100 [IU]/ML
20 INJECTION, SOLUTION SUBCUTANEOUS AT BEDTIME
Refills: 0 | Status: DISCONTINUED | OUTPATIENT
Start: 2020-09-17 | End: 2020-09-17

## 2020-09-17 RX ORDER — INSULIN LISPRO 100/ML
10 VIAL (ML) SUBCUTANEOUS
Refills: 0 | Status: DISCONTINUED | OUTPATIENT
Start: 2020-09-17 | End: 2020-09-17

## 2020-09-17 RX ORDER — SODIUM CHLORIDE 9 MG/ML
1000 INJECTION, SOLUTION INTRAVENOUS
Refills: 0 | Status: DISCONTINUED | OUTPATIENT
Start: 2020-09-17 | End: 2020-09-17

## 2020-09-17 RX ORDER — FENTANYL CITRATE 50 UG/ML
50 INJECTION INTRAVENOUS
Refills: 0 | Status: DISCONTINUED | OUTPATIENT
Start: 2020-09-17 | End: 2020-09-17

## 2020-09-17 RX ADMIN — Medication 250 MILLIGRAM(S): at 17:58

## 2020-09-17 RX ADMIN — PIPERACILLIN AND TAZOBACTAM 25 GRAM(S): 4; .5 INJECTION, POWDER, LYOPHILIZED, FOR SOLUTION INTRAVENOUS at 21:35

## 2020-09-17 RX ADMIN — PIPERACILLIN AND TAZOBACTAM 25 GRAM(S): 4; .5 INJECTION, POWDER, LYOPHILIZED, FOR SOLUTION INTRAVENOUS at 14:00

## 2020-09-17 RX ADMIN — INSULIN GLARGINE 20 UNIT(S): 100 INJECTION, SOLUTION SUBCUTANEOUS at 21:48

## 2020-09-17 RX ADMIN — ONDANSETRON 4 MILLIGRAM(S): 8 TABLET, FILM COATED ORAL at 15:26

## 2020-09-17 RX ADMIN — Medication 2: at 15:48

## 2020-09-17 RX ADMIN — ATORVASTATIN CALCIUM 20 MILLIGRAM(S): 80 TABLET, FILM COATED ORAL at 21:35

## 2020-09-17 RX ADMIN — Medication 250 MILLIGRAM(S): at 05:29

## 2020-09-17 RX ADMIN — Medication 10 MILLIGRAM(S): at 16:30

## 2020-09-17 NOTE — PROGRESS NOTE ADULT - ASSESSMENT
This 54 yo female with history of DM type 2, TIA, HLD, R hemicolectomy, primary anastomosis, ventral hernia repair with biologic mesh for strangulated ventral hernia who now presents with 'blood blister' on toe and sepsis with leukocytosis, tachycardia and low grade temperature. Patient reports she had 1 month ago removal of a wart on her right 1st toe with Dr. Mccollum podiatry, has not had issues with it since but yesterday noted that after stepping out of the shower she has blister on her toe. She called her podiatrist that night to make appointment and ended having nausea and vomiting. The following day she had enlargement of her toe and fever at home. She took tyelnol and came to the ED.    In the ED for N/V ED ordered CT abdomen that did not show obstruction but did show large ventral wall seroma. At this time she has no complaints of abdominal pain or further N/V. She is feeling better in the ED. She denies any other infectious symptomatology. Denies chest pain, SOB, cough. +fever/chills. She received 1 L of LR and vancomycin and zosyn.   (14 Sep 2020 01:15)    patient reports that she did not feel any pain until Sunday, and then the toe "Blew up"  patient had been seen by podiatry, s/p debridement of the RIGHT hallux  foul smelling per podiatry team.    Patient see's PA at office of Dr. Presley.    Impression:  - diabetes  - neuropathy  - right foot infection  Right foot osteomyelitis  - WBC elevation    Plan:    - OM confirm in right hallux distal phalanx    surgical cx with Strep species  - continue Antibiotics:  piperacillin/tazobactam IVPB.. 3.375 Gram(s) IV Intermittent every 8 hours       - A1c is elevated : on this patient  A1C with Estimated Average Glucose Result: 10.1 % (09-15-20 @ 08:04)  - will need good DM control      - WBC elevation from infection  - now resolved    - ESR and CRP ordered   Sedimentation Rate, Erythrocyte: 48 mm/hr (09-15-20 @ 08:04)    no contraindications to planned surgical resection of the infected digit from ID standpoint    please send OR cultures.

## 2020-09-17 NOTE — PROGRESS NOTE ADULT - SUBJECTIVE AND OBJECTIVE BOX
Patient is a 55y old  Female who presents with a chief complaint of Sepsis 2/2 Infected R 1st digit DM Ulcer (14 Sep 2020 01:15)      HPI:  56 yo female with history of DM type 2, TIA, HLD, R hemicolectomy, primary anastomosis, ventral hernia repair with biologic mesh for strangulated ventral hernia who now presents with 'blood blister' on toe and sepsis with leukocytosis, tachycardia and low grade temperature. Patient reports she had 1 month ago removal of a wart on her right 1st toe with Dr. Mccollum podiatry, has not had issues with it since but yesterday noted that after stepping out of the shower she has blister on her toe. She called her podiatrist that night to make appointment and ended having nausea and vomiting. The following day she had enlargement of her toe and fever at home. She took tyelnol and came to the ED.    In the ED for N/V ED ordered CT abdomen that did not show obstruction but did show large ventral wall seroma. At this time she has no complaints of abdominal pain or further N/V. She is feeling better in the ED. She denies any other infectious symptomatology. Denies chest pain, SOB, cough. +fever/chills. She received 1 L of LR and vancomycin and zosyn.   (14 Sep 2020 01:15)    History as above. Pt seen bedside in NAD. States that she has a blood blister on her R big toe for several days and has gotten worse and more swollen. Adds she used to follow a podiatrist but has not seen for a few months. Came to ED for fever and nausea. Denies any pain to the foot.     Podiatry HPI: Patient seen bedside this morning. No acute event overnight s/p extensive bedside debridement of Right great toe. Patient relates no pain, understand she will have surgery today for removal of toe. Coping better with the idea of an amputation. Pt denies any pain currently. Denies f/c/n/v/sob    PMH: Hypercholesteremia    TIA (transient ischemic attack)    DM (diabetes mellitus)      Allergies: Biaxin (Vomiting)    MEDICATIONS  (STANDING):  aspirin  chewable 81 milliGRAM(s) Oral daily  atorvastatin 20 milliGRAM(s) Oral at bedtime  dextrose 5%. 1000 milliLiter(s) (50 mL/Hr) IV Continuous <Continuous>  dextrose 50% Injectable 12.5 Gram(s) IV Push once  dextrose 50% Injectable 25 Gram(s) IV Push once  dextrose 50% Injectable 25 Gram(s) IV Push once  heparin   Injectable 5000 Unit(s) SubCutaneous every 8 hours  insulin glargine Injectable (LANTUS) 12 Unit(s) SubCutaneous at bedtime  insulin lispro (HumaLOG) corrective regimen sliding scale   SubCutaneous three times a day before meals  insulin lispro Injectable (HumaLOG) 5 Unit(s) SubCutaneous before breakfast  insulin lispro Injectable (HumaLOG) 5 Unit(s) SubCutaneous before lunch  insulin lispro Injectable (HumaLOG) 5 Unit(s) SubCutaneous before dinner  lisinopril 5 milliGRAM(s) Oral daily  piperacillin/tazobactam IVPB.. 3.375 Gram(s) IV Intermittent every 8 hours  vancomycin  IVPB 1000 milliGRAM(s) IV Intermittent every 12 hours    MEDICATIONS  (PRN):  dextrose 40% Gel 15 Gram(s) Oral once PRN Blood Glucose LESS THAN 70 milliGRAM(s)/deciliter  glucagon  Injectable 1 milliGRAM(s) IntraMuscular once PRN Glucose LESS THAN 70 milligrams/deciliter  ondansetron Injectable 4 milliGRAM(s) IV Push every 6 hours PRN Nausea and/or Vomiting      FH: No pertinent family history in first degree relatives      PSX: S/P right hemicolectomy    S/P repair of ventral hernia    Vital Signs Last 24 Hrs  T(C): 36.6 (17 Sep 2020 06:43), Max: 36.9 (16 Sep 2020 21:56)  T(F): 97.8 (17 Sep 2020 06:43), Max: 98.5 (16 Sep 2020 21:56)  HR: 88 (17 Sep 2020 06:43) (80 - 88)  BP: 155/82 (17 Sep 2020 06:43) (146/88 - 160/80)  BP(mean): --  RR: 18 (17 Sep 2020 06:43) (18 - 18)  SpO2: 100% (17 Sep 2020 06:43) (96% - 100%)    LABS                                 12.4   10.68 )-----------( 237      ( 17 Sep 2020 07:47 )             38.6     WBC Count: 10.68 K/uL (09-17 @ 07:47)  WBC Count: 9.06 K/uL (09-16 @ 17:22)  WBC Count: 9.99 K/uL (09-15 @ 08:04)  WBC Count: 16.13 K/uL (09-14 @ 02:25)  WBC Count: 19.48 K/uL (09-13 @ 20:24)    09-17    138  |  102  |  11.0  ----------------------------<  249<H>  4.7   |  25.0  |  0.60    Ca    9.0      17 Sep 2020 07:47        Sedimentation Rate, Erythrocyte (09.15.20 @ 08:04)    Sedimentation Rate, Erythrocyte: 48 mm/hr    A1C with Estimated Average Glucose (09.15.20 @ 08:04)    A1C with Estimated Average Glucose Result: 10.1 %    Estimated Average Glucose: 243 mg/dL          PHYSICAL EXAM  GEN: FARHAT SLADE is a pleasant well-nourished, well developed 55y Female in no acute distress, alert awake, and oriented to person, place and time.   LE Focused:    Vasc: DP/PT palpable, CFT Brisk to digits x9, R hallux delayed, edema to R hallux  Derm: R hallux wound measuring ~4cm x 2cm x 0.8cm, with fibronecrotic wound base and eschar to the wound edges, erythema edema noted to the great toe, malodorous, no purulence or discharge today, sloughing with liquifactive necrosis. Little soft tissue coverage to distal phalanx plantarly. distal tip non-viable  Neuro: protective sensation diminished   MSK: no gross deformity noted.     Image:    EXAM:  TOE(S)-RIGHT FOOT                          PROCEDURE DATE:  09/13/2020          INTERPRETATION:  AP, lateral, and oblique views of the RIGHT first hallux are submitted.    Clinical data; trauma with pain.    There is diffuse soft tissue swelling of the first toe with subcutaneous and air collections on the plantar surface. Adjacent to distal and proximal first digit osseous structures show no gross of fracture or osteolytic lesion./  IMPRESSION: Diffuse soft tissue swelling with subcutaneous air. No fracture or gross osteolytic lesion.    If osteomyelitis is considered, despite conservative therapy, and soft tissue / bone infection requires further assessment, follow-up MRI recommended.        YIFAN PUENTES M.D., ATTENDING RADIOLOGIST  This document has been electronically signed. Sep 14 2020  1:48PM    < from: MR Foot No Cont, Right (09.16.20 @ 10:28) >     EXAM:  MR FOOT RT                          PROCEDURE DATE:  09/16/2020          INTERPRETATION:  Clinical indication: Toe swelling and discoloration with an open wound.    Multiplanar multisequence MRI of the right foot was performed from the level of the toes to the level of the talonavicular joint without intravenous contrast.    FINDINGS:    There is marked maceration and deficiency of the soft tissues along the medial plantar aspect of the first toe extending from the distal phalanx to theinterphalangeal joint. There appears to be exposure of the plantar aspect of the first distal phalanx. There is mild subcortical osseous edema involving the distal phalanx of the first toe with slightly decreased subcortical T1 marrow signal concerning for early osteomyelitis. There is diffuse cellulitis throughout the first toe which extends along the dorsal aspect of the foot. There is small fluid within the first toe flexor tendon sheath extending to the level of the midfoot consistent with mild tenosynovitis.    There is osseous edema centered about the second and third tarsometatarsal articulations consistent with osseous stress reaction. Osseous stress reaction about the second tarsometatarsal articulation extends from the base of the second metatarsal to the mid shaft. These findings are seen in the setting of moderate to severe arthrosis. There is no evidence of acute fracture.    There is mild hallux valgus with moderate first metatarsophalangeal and hallux sesamoid joint arthrosis. There is prominent subchondral cystic change along the plantar aspect of the first metatarsal head. There is a nonspecific moderate first metatarsal phalangeal joint effusion. Correlate clinically to exclude superimposed infection.    There is edema and fatty atrophy throughout the plantar musculature consistent with denervation related changes. No acute tendinous injury is demonstrated. The Lisfranc ligament is preserved.    IMPRESSION:    Extensive maceration and deficiency of the soft tissues about the medial plantar aspect of the first toe. Findings suggestive of early subcortical osteomyelitis along the plantar aspect of the distal phalanx.    Mild tenosynovitis within the first toe flexor tendon.    Moderate first metatarsal phalangeal joint arthrosis with prominent subchondral cystic change along the plantar aspect of the first metatarsal head. Nonspecific moderate first metatarsal phalangeal joint effusion. Correlate clinically to this exclude superimposed infection.    Prominent osseous stress reaction centered about the second and third tarsometatarsal articulations.          TANIKA HEIN M.D., ATTENDING RADIOLOGIST  This document has been electronically signed. Sep 16 2020 10:52AM    < end of copied text >      Culture - Surgical Swab (09.14.20 @ 22:12)    Specimen Source: .Surgical Swab right hallux    Culture Results:   Numerous Streptococcus anginosus  Few Corynebacterium species  "Susceptibilities not performed"        A:  R hallux gas gangrenen s/p bedside I&D 9/14  R hallux OM  R hallux ulceration    P:  Patient evaluated, chart reviewed  X-ray of toe reviewed as above  X-ray of foot reviewed, no proximal gas  MRI as above - OM   Culture taken - reviewed as above  Dressing left intact  Medical clearance noted, appreciated   Patient is scheduled for OR debridement and partial 1st ray amputation today 9/17 12:30pm  NPO  WB as tolerated to R heel  Podiatry to follow while in house  Discussed and seen with Dr. Yoo     Wound care instructions:  1. Apply betadine to 4x4 gauze and place over wound  2. Secure with Kerlix wrap and tape  3. Change Daily

## 2020-09-17 NOTE — BRIEF OPERATIVE NOTE - NSICDXBRIEFPROCEDURE_GEN_ALL_CORE_FT
PROCEDURES:  Partial amputation of first ray of right foot by open approach 17-Sep-2020 15:10:45  Yasmani Delarosa

## 2020-09-17 NOTE — PROGRESS NOTE ADULT - SUBJECTIVE AND OBJECTIVE BOX
Internal Medicine Hospitalist Progress Note    follow up for toe infection , OM of great toe   pt is for OR today , she is nervous   no pain , no SOB , emotional support given   podiatry resident at the bedside           Vital Signs Last 24 Hrs  T(C): 36.6 (17 Sep 2020 06:43), Max: 36.9 (16 Sep 2020 21:56)  T(F): 97.8 (17 Sep 2020 06:43), Max: 98.5 (16 Sep 2020 21:56)  HR: 88 (17 Sep 2020 06:43) (77 - 88)  BP: 155/82 (17 Sep 2020 06:43) (131/81 - 160/80)  BP(mean): 98 (16 Sep 2020 11:06) (98 - 98)  RR: 18 (17 Sep 2020 06:43) (18 - 18)  SpO2: 100% (17 Sep 2020 06:43) (96% - 100%)I&O's Summary      PHYSICAL EXAM:      Constitutional: awake alert , no distress     Neck: supple, no JVD     Respiratory: CTa bilateral     Cardiovascular: regular s1 /s2     Gastrointestinal: soft no tenderness , Bs positive     Extremities: no pretibial edema , right toe dressing in place     Psychiatric: anxious ,appropriate affect      CAPILLARY BLOOD GLUCOSE      POCT Blood Glucose.: 285 mg/dL (17 Sep 2020 08:28)  POCT Blood Glucose.: 277 mg/dL (16 Sep 2020 22:05)  POCT Blood Glucose.: 164 mg/dL (16 Sep 2020 16:58)  POCT Blood Glucose.: 261 mg/dL (16 Sep 2020 12:25)                            12.4   10.68 )-----------( 237      ( 17 Sep 2020 07:47 )             38.6   09-17    138  |  102  |  11.0  ----------------------------<  249<H>  4.7   |  25.0  |  0.60    Ca    9.0      17 Sep 2020 07:47    PTT - ( 13 Sep 2020 20:24 )  PTT:28.6 sec    Radiology :

## 2020-09-17 NOTE — PROVIDER CONTACT NOTE (OTHER) - SITUATION
Pt going to OR in AM for Great toe amputation on R-foot. PO meds given w/1 sip of water. PA contacted for clarification on administration of heparin.

## 2020-09-17 NOTE — PRE-OP CHECKLIST - AS BP NONINV METHOD
Adrianna returned my call. Instructed that they would call the dr on call and get back with me as to if he will take her with only one person to sign consent. Family made aware.    electronic

## 2020-09-17 NOTE — PROGRESS NOTE ADULT - SUBJECTIVE AND OBJECTIVE BOX
[facility]  INFECTIOUS DISEASES AND INTERNAL MEDICINE at [place]  =======================================================  Mitchell Brown MD  Diplomates American Board of Internal Medicine and Infectious Diseases  Tel  [phone]  Fax [phone]  =======================================================    [id number]  FARHAT SLADE   follow up:   RIGHT hallux infection   s/p MRI of foot this AM    pending OR today  no fevers      =======================================================  REVIEW OF SYSTEMS:  CONSTITUTIONAL:  No Fever or chills  HEENT:  No diplopia or blurred vision.  No earache, sore throat or runny nose.  CARDIOVASCULAR:  No pressure, squeezing, strangling, tightness, heaviness or aching about the chest, neck, axilla or epigastrium.  RESPIRATORY:  No cough, shortness of breath  GASTROINTESTINAL:  No nausea, vomiting or diarrhea.  GENITOURINARY:  No dysuria, frequency or urgency. No Blood in urine  MUSCULOSKELETAL:  no joint aches, no muscle pain  SKIN:  No change in skin, hair or nails.  NEUROLOGIC:  No Headaches, seizures or weakness.  PSYCHIATRIC:  No disorder of thought or mood.  ENDOCRINE:  No heat or cold intolerance  HEMATOLOGICAL:  No easy bruising or bleeding.   =======================================================    Allergies  Biaxin (Vomiting)        ======================================================  Physical Exam:  ============     General:  No acute distress.  Eye: Pupils are equal, round and reactive to light, Extraocular movements are intact, Normal conjunctiva.  HENT: Normocephalic, Oral mucosa is moist, No pharyngeal erythema, No sinus tenderness.  Neck: Supple, No lymphadenopathy.  Respiratory: Lungs are clear to auscultation, Respirations are non-labored.  Cardiovascular: Normal rate, Regular rhythm,   Gastrointestinal: Soft, Non-tender, Non-distended, Normal bowel sounds.  Genitourinary: No costovertebral angle tenderness.  Lymphatics: No lymphadenopathy neck,   Musculoskeletal: Normal range of motion, Normal strength.  Integumentary:  RIGHT HALLUX with  DRESSING IN PLACE  Neurologic: Alert, Oriented, No focal deficits, Cranial Nerves II-XII are grossly intact.  POOR SENSATION OF Pain on foot bilaterally.  Psychiatric: Appropriate mood & affect.    =======================================================  Vitals:  ============  T(F): 97.8 (17 Sep 2020 06:43), Max: 98.5 (16 Sep 2020 21:56)  HR: 88 (17 Sep 2020 06:43)  BP: 155/82 (17 Sep 2020 06:43)  RR: 18 (17 Sep 2020 06:43)  SpO2: 100% (17 Sep 2020 06:43) (96% - 100%)  temp max in last 48H T(F): , Max: 98.5 (09-16-20 @ 21:56)    =======================================================  Current Antibiotics:  piperacillin/tazobactam IVPB.. 3.375 Gram(s) IV Intermittent every 8 hours    Other medications:  aspirin  chewable 81 milliGRAM(s) Oral daily  atorvastatin 20 milliGRAM(s) Oral at bedtime  dextrose 5%. 1000 milliLiter(s) IV Continuous <Continuous>  dextrose 50% Injectable 12.5 Gram(s) IV Push once  dextrose 50% Injectable 25 Gram(s) IV Push once  dextrose 50% Injectable 25 Gram(s) IV Push once  heparin   Injectable 5000 Unit(s) SubCutaneous every 8 hours  influenza   Vaccine 0.5 milliLiter(s) IntraMuscular once  insulin glargine Injectable (LANTUS) 20 Unit(s) SubCutaneous at bedtime  insulin lispro (HumaLOG) corrective regimen sliding scale   SubCutaneous three times a day before meals  insulin lispro Injectable (HumaLOG) 10 Unit(s) SubCutaneous three times a day before meals  lisinopril 5 milliGRAM(s) Oral daily  polyethylene glycol 3350 17 Gram(s) Oral two times a day  saccharomyces boulardii 250 milliGRAM(s) Oral two times a day  sodium chloride 0.9%. 1000 milliLiter(s) IV Continuous <Continuous>      =======================================================  Labs:                        12.4   10.68 )-----------( 237      ( 17 Sep 2020 07:47 )             38.6      09-17    138  |  102  |  11.0  ----------------------------<  249<H>  4.7   |  25.0  |  0.60    Ca    9.0      17 Sep 2020 07:47        Culture - Surgical Swab (collected 09-14-20 @ 22:12)  Source: .Surgical Swab right hallux    Culture - Urine (collected 09-14-20 @ 08:14)  Source: .Urine Clean Catch (Midstream)  Final Report (09-15-20 @ 07:38):    No growth    Culture - Blood (collected 09-13-20 @ 21:18)  Source: .Blood Blood-Peripheral    Culture - Blood (collected 09-13-20 @ 20:26)  Source: .Blood Blood-Peripheral      Creatinine, Serum: 0.60 mg/dL (09-17-20 @ 07:47)  Creatinine, Serum: 0.64 mg/dL (09-16-20 @ 17:22)  Creatinine, Serum: 0.83 mg/dL (09-14-20 @ 02:25)  Creatinine, Serum: 0.94 mg/dL (09-13-20 @ 20:24)    WBC Count: 10.68 K/uL (09-17-20 @ 07:47)  WBC Count: 9.06 K/uL (09-16-20 @ 17:22)  WBC Count: 9.99 K/uL (09-15-20 @ 08:04)  WBC Count: 16.13 K/uL (09-14-20 @ 02:25)  WBC Count: 19.48 K/uL (09-13-20 @ 20:24)      COVID-19 PCR: NotDetec (09-13-20 @ 21:35)      Alkaline Phosphatase, Serum: 165 U/L (09-13-20 @ 20:24)  Alanine Aminotransferase (ALT/SGPT): 16 U/L (09-13-20 @ 20:24)  Aspartate Aminotransferase (AST/SGOT): 14 U/L (09-13-20 @ 20:24)  Bilirubin Total, Serum: 0.8 mg/dL (09-13-20 @ 20:24)       < from: MR Foot No Cont, Right (09.16.20 @ 10:28) >     EXAM:  MR FOOT RT                          PROCEDURE DATE:  09/16/2020          INTERPRETATION:  Clinical indication: Toe swelling and discoloration with an open wound.    Multiplanar multisequence MRI of the right foot was performed from the level of the toes to the level of the talonavicular joint without intravenous contrast.    FINDINGS:    There is marked maceration and deficiency of the soft tissues along the medial plantar aspect of the first toe extending from the distal phalanx to theinterphalangeal joint. There appears to be exposure of the plantar aspect of the first distal phalanx. There is mild subcortical osseous edema involving the distal phalanx of the first toe with slightly decreased subcortical T1 marrow signal concerning for early osteomyelitis. There is diffuse cellulitis throughout the first toe which extends along the dorsal aspect of the foot. There is small fluid within the first toe flexor tendon sheath extending to the level of the midfoot consistent with mild tenosynovitis.    There is osseous edema centered about the second and third tarsometatarsal articulations consistent with osseous stress reaction. Osseous stress reaction about the second tarsometatarsal articulation extends from the base of the second metatarsal to the mid shaft. These findings are seen in the setting of moderate to severe arthrosis. There is no evidence of acute fracture.    There is mild hallux valgus with moderate first metatarsophalangeal and hallux sesamoid joint arthrosis. There is prominent subchondral cystic change along the plantar aspect of the first metatarsal head. There is a nonspecific moderate first metatarsal phalangeal joint effusion. Correlate clinically to exclude superimposed infection.    There is edema and fatty atrophy throughout the plantar musculature consistent with denervation related changes. No acute tendinous injury is demonstrated. The Lisfranc ligament is preserved.    IMPRESSION:    Extensive maceration and deficiency of the soft tissues about the medial plantar aspect of the first toe. Findings suggestive of early subcortical osteomyelitis along the plantar aspect of the distal phalanx.    Mild tenosynovitis within the first toe flexor tendon.    Moderate first metatarsal phalangeal joint arthrosis with prominent subchondral cystic change along the plantar aspect of the first metatarsal head. Nonspecific moderate first metatarsal phalangeal joint effusion. Correlate clinically to this exclude superimposed infection.    Prominent osseous stress reaction centered about the second and third tarsometatarsal articulations.         TANIKA HEIN M.D., ATTENDING RADIOLOGIST  This document has been electronically signed. Sep 16 2020 10:52AM    < end of copied text >

## 2020-09-17 NOTE — PROGRESS NOTE ADULT - ASSESSMENT
56 yo female with history of DM type 2, TIA, HLD, R hemicolectomy, primary anastomosis, ventral hernia repair with biologic mesh for strangulated ventral hernia who now presents with 'blood blister' on toe and sepsis with leukocytosis, tachycardia and low grade temperature. Patient reports she had 1 month ago removal of a wart on her right 1st toe with Dr. Mccollum podiatry, has not had issues with it since but yesterday noted that after stepping out of the shower she has blister on her toe. She called her podiatrist that night to make appointment and ended having nausea and vomiting. The following day she had enlargement of her toe and fever at home.  In the ED for N/V ED ordered CT abdomen that did not show obstruction but did show large ventral wall seroma, seen by surgery no sign of infection , +fever/chills. She received 1 L of LR and vancomycin and zosyn for supected toe infection , seen by podiatry s/p bedside debridment , ID on borad MR of foot positive for acute OM , hyperglycemia insulin regimen adjusted for better control , improving     1-Acute OM of right great toe   for OR today by podiatry   cont iv abx   pt is optimized for planned surgery at present     sepsis on admission resolved     2- Diabetes Mellitus type2 , hyperglycemia poorly controlled   educated  pt for diet , life style changes   checking BG at home   will refer to endocrinologist after discharge   BG is better   cont sliding scale insulin dose pre meals increased     3-  HTN   cont lisinopril  increase dose as needed     4- Constipated   had 2 small  BM overnight   stable   will continue bowel regimen , laxative as needed   controlled       5- Morbid obesity   will benefit from life style changes weight loss, exercise   education counseling given , her  at the bedside     will get nutrionist to educate about diet

## 2020-09-18 DIAGNOSIS — N83.8 OTHER NONINFLAMMATORY DISORDERS OF OVARY, FALLOPIAN TUBE AND BROAD LIGAMENT: ICD-10-CM

## 2020-09-18 LAB
CULTURE RESULTS: SIGNIFICANT CHANGE UP
GLUCOSE BLDC GLUCOMTR-MCNC: 128 MG/DL — HIGH (ref 70–99)
GLUCOSE BLDC GLUCOMTR-MCNC: 138 MG/DL — HIGH (ref 70–99)
GLUCOSE BLDC GLUCOMTR-MCNC: 167 MG/DL — HIGH (ref 70–99)
GLUCOSE BLDC GLUCOMTR-MCNC: 380 MG/DL — HIGH (ref 70–99)
GRAM STN FLD: SIGNIFICANT CHANGE UP
ORGANISM # SPEC MICROSCOPIC CNT: SIGNIFICANT CHANGE UP
ORGANISM # SPEC MICROSCOPIC CNT: SIGNIFICANT CHANGE UP
SPECIMEN SOURCE: SIGNIFICANT CHANGE UP

## 2020-09-18 PROCEDURE — 99233 SBSQ HOSP IP/OBS HIGH 50: CPT

## 2020-09-18 PROCEDURE — 99232 SBSQ HOSP IP/OBS MODERATE 35: CPT

## 2020-09-18 RX ORDER — HEPARIN SODIUM 5000 [USP'U]/ML
5000 INJECTION INTRAVENOUS; SUBCUTANEOUS EVERY 8 HOURS
Refills: 0 | Status: DISCONTINUED | OUTPATIENT
Start: 2020-09-18 | End: 2020-09-21

## 2020-09-18 RX ORDER — INSULIN LISPRO 100/ML
VIAL (ML) SUBCUTANEOUS
Refills: 0 | Status: DISCONTINUED | OUTPATIENT
Start: 2020-09-18 | End: 2020-09-21

## 2020-09-18 RX ORDER — INSULIN GLARGINE 100 [IU]/ML
25 INJECTION, SOLUTION SUBCUTANEOUS AT BEDTIME
Refills: 0 | Status: DISCONTINUED | OUTPATIENT
Start: 2020-09-18 | End: 2020-09-21

## 2020-09-18 RX ORDER — INSULIN LISPRO 100/ML
5 VIAL (ML) SUBCUTANEOUS ONCE
Refills: 0 | Status: COMPLETED | OUTPATIENT
Start: 2020-09-18 | End: 2020-09-18

## 2020-09-18 RX ORDER — ASPIRIN/CALCIUM CARB/MAGNESIUM 324 MG
81 TABLET ORAL DAILY
Refills: 0 | Status: DISCONTINUED | OUTPATIENT
Start: 2020-09-18 | End: 2020-09-21

## 2020-09-18 RX ORDER — CEFAZOLIN SODIUM 1 G
2000 VIAL (EA) INJECTION EVERY 8 HOURS
Refills: 0 | Status: DISCONTINUED | OUTPATIENT
Start: 2020-09-18 | End: 2020-09-21

## 2020-09-18 RX ADMIN — LISINOPRIL 5 MILLIGRAM(S): 2.5 TABLET ORAL at 05:25

## 2020-09-18 RX ADMIN — Medication 81 MILLIGRAM(S): at 11:42

## 2020-09-18 RX ADMIN — ATORVASTATIN CALCIUM 20 MILLIGRAM(S): 80 TABLET, FILM COATED ORAL at 22:04

## 2020-09-18 RX ADMIN — Medication 250 MILLIGRAM(S): at 05:25

## 2020-09-18 RX ADMIN — Medication 10 UNIT(S): at 12:19

## 2020-09-18 RX ADMIN — HEPARIN SODIUM 5000 UNIT(S): 5000 INJECTION INTRAVENOUS; SUBCUTANEOUS at 22:03

## 2020-09-18 RX ADMIN — Medication 250 MILLIGRAM(S): at 17:08

## 2020-09-18 RX ADMIN — Medication 10 UNIT(S): at 08:24

## 2020-09-18 RX ADMIN — Medication 2: at 12:19

## 2020-09-18 RX ADMIN — Medication 5 UNIT(S): at 08:45

## 2020-09-18 RX ADMIN — Medication 10 UNIT(S): at 17:08

## 2020-09-18 RX ADMIN — PIPERACILLIN AND TAZOBACTAM 25 GRAM(S): 4; .5 INJECTION, POWDER, LYOPHILIZED, FOR SOLUTION INTRAVENOUS at 05:25

## 2020-09-18 RX ADMIN — Medication 100 MILLIGRAM(S): at 22:04

## 2020-09-18 RX ADMIN — HEPARIN SODIUM 5000 UNIT(S): 5000 INJECTION INTRAVENOUS; SUBCUTANEOUS at 13:15

## 2020-09-18 RX ADMIN — INSULIN GLARGINE 25 UNIT(S): 100 INJECTION, SOLUTION SUBCUTANEOUS at 22:04

## 2020-09-18 RX ADMIN — Medication 100 MILLIGRAM(S): at 13:16

## 2020-09-18 NOTE — PROGRESS NOTE ADULT - ASSESSMENT
54 yo female with history of DM type 2, TIA, HLD, R hemicolectomy, primary anastomosis, ventral hernia repair with biologic mesh for strangulated ventral hernia who now presents with 'blood blister' on toe and sepsis with leukocytosis, tachycardia and low grade temperature. Patient reports she had 1 month ago removal of a wart on her right 1st toe with Dr. Mccollum podiatry, has not had issues with it since but yesterday noted that after stepping out of the shower she has blister on her toe. She called her podiatrist that night to make appointment and ended having nausea and vomiting. The following day she had enlargement of her toe and fever at home.  In the ED for N/V ED ordered CT abdomen that did not show obstruction but did show large ventral wall seroma, seen by surgery no sign of infection , +fever/chills. She received 1 L of LR and vancomycin and zosyn for supected toe infection , seen by podiatry s/p bedside debridment , ID on borad MR of foot positive for acute OM , hyperglycemia insulin regimen adjusted for better control , post op toe amputation by podiatry , gyn oncology consulted for incidental R ovarian cyst seen on CT of abd       1-Acute OM of right great toe   s/p toea amputation   cont wound care per podiatry    on iv abx   ID follow up     2- Diabetes Mellitus type2 , poorly controlled with hyperglycemia  increase lantus dose and cont sliding scale with premeals insulin coverage     3- Right ovarian cyst incidental findings   gyn oncology consulted   tumor markers and MR ordered     4-  HTN   cont lisinopril    controlled     5- Constipation   resolved with bowel regimen     6- Morbid obesity   will benefit from life style changes weight loss, exercise   education counseling given , her  at the bedside       discharge once cleared by ID and podiatry   intra op culture is pending

## 2020-09-18 NOTE — PROVIDER CONTACT NOTE (OTHER) - ACTION/TREATMENT ORDERED:
PA advised to hold medication/heparin.
will place new orders for nausea.
No intervention at this time due to pt being NPO after midnight for AM procedure and receiving PM Lantus.
give pre meal 10 units and additional 5 units one time dose. 15 units in total.

## 2020-09-18 NOTE — PROGRESS NOTE ADULT - SUBJECTIVE AND OBJECTIVE BOX
Patient is a 55y old  Female who presents with a chief complaint of Sepsis 2/2 Infected R 1st digit DM Ulcer (14 Sep 2020 01:15)      HPI:  56 yo female with history of DM type 2, TIA, HLD, R hemicolectomy, primary anastomosis, ventral hernia repair with biologic mesh for strangulated ventral hernia who now presents with 'blood blister' on toe and sepsis with leukocytosis, tachycardia and low grade temperature. Patient reports she had 1 month ago removal of a wart on her right 1st toe with Dr. Mccollum podiatry, has not had issues with it since but yesterday noted that after stepping out of the shower she has blister on her toe. She called her podiatrist that night to make appointment and ended having nausea and vomiting. The following day she had enlargement of her toe and fever at home. She took tyelnol and came to the ED.    In the ED for N/V ED ordered CT abdomen that did not show obstruction but did show large ventral wall seroma. At this time she has no complaints of abdominal pain or further N/V. She is feeling better in the ED. She denies any other infectious symptomatology. Denies chest pain, SOB, cough. +fever/chills. She received 1 L of LR and vancomycin and zosyn.   (14 Sep 2020 01:15)    History as above. Pt seen bedside in NAD. States that she has a blood blister on her R big toe for several days and has gotten worse and more swollen. Adds she used to follow a podiatrist but has not seen for a few months. Came to ED for fever and nausea. Denies any pain to the foot.     Podiatry HPI: Patient seen bedside this morning. S/p R foot partial 1st ray amputation 9/17. States that she walked to the bathroom last night and noticed blood on the bottom of her bandage. Pt denies any pain currently. Denies f/c/n/v/sob    PMH: Hypercholesteremia    TIA (transient ischemic attack)    DM (diabetes mellitus)      Allergies: Biaxin (Vomiting)    MEDICATIONS  (STANDING):  aspirin  chewable 81 milliGRAM(s) Oral daily  atorvastatin 20 milliGRAM(s) Oral at bedtime  dextrose 5%. 1000 milliLiter(s) (50 mL/Hr) IV Continuous <Continuous>  dextrose 50% Injectable 12.5 Gram(s) IV Push once  dextrose 50% Injectable 25 Gram(s) IV Push once  dextrose 50% Injectable 25 Gram(s) IV Push once  heparin   Injectable 5000 Unit(s) SubCutaneous every 8 hours  insulin glargine Injectable (LANTUS) 12 Unit(s) SubCutaneous at bedtime  insulin lispro (HumaLOG) corrective regimen sliding scale   SubCutaneous three times a day before meals  insulin lispro Injectable (HumaLOG) 5 Unit(s) SubCutaneous before breakfast  insulin lispro Injectable (HumaLOG) 5 Unit(s) SubCutaneous before lunch  insulin lispro Injectable (HumaLOG) 5 Unit(s) SubCutaneous before dinner  lisinopril 5 milliGRAM(s) Oral daily  piperacillin/tazobactam IVPB.. 3.375 Gram(s) IV Intermittent every 8 hours  vancomycin  IVPB 1000 milliGRAM(s) IV Intermittent every 12 hours    MEDICATIONS  (PRN):  dextrose 40% Gel 15 Gram(s) Oral once PRN Blood Glucose LESS THAN 70 milliGRAM(s)/deciliter  glucagon  Injectable 1 milliGRAM(s) IntraMuscular once PRN Glucose LESS THAN 70 milligrams/deciliter  ondansetron Injectable 4 milliGRAM(s) IV Push every 6 hours PRN Nausea and/or Vomiting      FH: No pertinent family history in first degree relatives      PSX: S/P right hemicolectomy    S/P repair of ventral hernia    ICU Vital Signs Last 24 Hrs  T(C): 37.1 (18 Sep 2020 10:19), Max: 37.1 (18 Sep 2020 10:19)  T(F): 98.7 (18 Sep 2020 10:19), Max: 98.7 (18 Sep 2020 10:19)  HR: 80 (18 Sep 2020 10:19) (72 - 91)  BP: 125/68 (18 Sep 2020 10:19) (94/58 - 166/92)  BP(mean): 87 (18 Sep 2020 10:19) (87 - 110)  ABP: --  ABP(mean): --  RR: 16 (18 Sep 2020 10:19) (16 - 22)  SpO2: 97% (18 Sep 2020 10:19) (94% - 100%)      LABS                                 12.4   10.68 )-----------( 237      ( 17 Sep 2020 07:47 )             38.6     WBC Count: 10.68 K/uL (09-17 @ 07:47)  WBC Count: 9.06 K/uL (09-16 @ 17:22)  WBC Count: 9.99 K/uL (09-15 @ 08:04)  WBC Count: 16.13 K/uL (09-14 @ 02:25)  WBC Count: 19.48 K/uL (09-13 @ 20:24)    09-17    138  |  102  |  11.0  ----------------------------<  249<H>  4.7   |  25.0  |  0.60    Ca    9.0      17 Sep 2020 07:47        Sedimentation Rate, Erythrocyte (09.15.20 @ 08:04)    Sedimentation Rate, Erythrocyte: 48 mm/hr    A1C with Estimated Average Glucose (09.15.20 @ 08:04)    A1C with Estimated Average Glucose Result: 10.1 %    Estimated Average Glucose: 243 mg/dL          PHYSICAL EXAM  GEN: FARHAT SLADE is a pleasant well-nourished, well developed 55y Female in no acute distress, alert awake, and oriented to person, place and time.   LE Focused:    Vasc: DP/PT palpable, CFT Brisk to digits x9, R hallux delayed, edema to R hallux  Derm: Right foot surgical site with blood soaked bandage, sutures intact, skin well coapted with no dehiscence, mild maceration to edges, no drainage, no erythema, no clinical sign of infection    Neuro: protective sensation diminished   MSK: no gross deformity noted.     Image:    EXAM:  TOE(S)-RIGHT FOOT                          PROCEDURE DATE:  09/13/2020          INTERPRETATION:  AP, lateral, and oblique views of the RIGHT first hallux are submitted.    Clinical data; trauma with pain.    There is diffuse soft tissue swelling of the first toe with subcutaneous and air collections on the plantar surface. Adjacent to distal and proximal first digit osseous structures show no gross of fracture or osteolytic lesion./  IMPRESSION: Diffuse soft tissue swelling with subcutaneous air. No fracture or gross osteolytic lesion.    If osteomyelitis is considered, despite conservative therapy, and soft tissue / bone infection requires further assessment, follow-up MRI recommended.        YIFAN PUENTES M.D., ATTENDING RADIOLOGIST  This document has been electronically signed. Sep 14 2020  1:48PM    < from: MR Foot No Cont, Right (09.16.20 @ 10:28) >     EXAM:  MR FOOT RT                          PROCEDURE DATE:  09/16/2020          INTERPRETATION:  Clinical indication: Toe swelling and discoloration with an open wound.    Multiplanar multisequence MRI of the right foot was performed from the level of the toes to the level of the talonavicular joint without intravenous contrast.    FINDINGS:    There is marked maceration and deficiency of the soft tissues along the medial plantar aspect of the first toe extending from the distal phalanx to theinterphalangeal joint. There appears to be exposure of the plantar aspect of the first distal phalanx. There is mild subcortical osseous edema involving the distal phalanx of the first toe with slightly decreased subcortical T1 marrow signal concerning for early osteomyelitis. There is diffuse cellulitis throughout the first toe which extends along the dorsal aspect of the foot. There is small fluid within the first toe flexor tendon sheath extending to the level of the midfoot consistent with mild tenosynovitis.    There is osseous edema centered about the second and third tarsometatarsal articulations consistent with osseous stress reaction. Osseous stress reaction about the second tarsometatarsal articulation extends from the base of the second metatarsal to the mid shaft. These findings are seen in the setting of moderate to severe arthrosis. There is no evidence of acute fracture.    There is mild hallux valgus with moderate first metatarsophalangeal and hallux sesamoid joint arthrosis. There is prominent subchondral cystic change along the plantar aspect of the first metatarsal head. There is a nonspecific moderate first metatarsal phalangeal joint effusion. Correlate clinically to exclude superimposed infection.    There is edema and fatty atrophy throughout the plantar musculature consistent with denervation related changes. No acute tendinous injury is demonstrated. The Lisfranc ligament is preserved.    IMPRESSION:    Extensive maceration and deficiency of the soft tissues about the medial plantar aspect of the first toe. Findings suggestive of early subcortical osteomyelitis along the plantar aspect of the distal phalanx.    Mild tenosynovitis within the first toe flexor tendon.    Moderate first metatarsal phalangeal joint arthrosis with prominent subchondral cystic change along the plantar aspect of the first metatarsal head. Nonspecific moderate first metatarsal phalangeal joint effusion. Correlate clinically to this exclude superimposed infection.    Prominent osseous stress reaction centered about the second and third tarsometatarsal articulations.          TANIKA HEIN M.D., ATTENDING RADIOLOGIST  This document has been electronically signed. Sep 16 2020 10:52AM    < end of copied text >      Culture - Surgical Swab (09.14.20 @ 22:12)    Specimen Source: .Surgical Swab right hallux    Culture Results:   Numerous Streptococcus anginosus  Few Corynebacterium species  "Susceptibilities not performed"    < from: Xray Foot AP + Lateral + Oblique, Right (09.17.20 @ 15:50) >     EXAM:  FOOT-RIGHT                          PROCEDURE DATE:  09/17/2020          INTERPRETATION:  Right foot. 3 views postop.    There is been amputation at the mid shaft of the first metatarsal new since September 14.    There is a small inferior calcaneal spur.    IMPRESSION: New amputation as above.      YIFAN LONG M.D., ATTENDING RADIOLOGIST  This document has been electronically signed. Sep 17 2020  4:37PM    < end of copied text >    OR Path and Culture pending    A:  R partial 1st ray amputation 9/17  R hallux gas gangrene s/p bedside I&D 9/14  R hallux OM  R hallux ulceration    P:  Patient evaluated, chart reviewed  MRI as above - OM   Culture taken - reviewed as above  OR path and culture pending  Applied Steri strips, DSD, ACE to Right foot   WB as tolerated to R heel in surgical shoe  Recommend PT consult  Podiatry to follow while in house  Discussed with Dr. Yoo     Wound care instructions:  1. Apply xeroform, 4x4 gauze and place over wound  2. Secure with Kerlix wrap and ACE  3. Change every other day

## 2020-09-18 NOTE — PROGRESS NOTE ADULT - ASSESSMENT
This 54 yo female with history of DM type 2, TIA, HLD, R hemicolectomy, primary anastomosis, ventral hernia repair with biologic mesh for strangulated ventral hernia who now presents with 'blood blister' on toe and sepsis with leukocytosis, tachycardia and low grade temperature. Patient reports she had 1 month ago removal of a wart on her right 1st toe with Dr. Mccollum podiatry, has not had issues with it since but yesterday noted that after stepping out of the shower she has blister on her toe. She called her podiatrist that night to make appointment and ended having nausea and vomiting. The following day she had enlargement of her toe and fever at home. She took tyelnol and came to the ED.    In the ED for N/V ED ordered CT abdomen that did not show obstruction but did show large ventral wall seroma. At this time she has no complaints of abdominal pain or further N/V. She is feeling better in the ED. She denies any other infectious symptomatology. Denies chest pain, SOB, cough. +fever/chills. She received 1 L of LR and vancomycin and zosyn.   (14 Sep 2020 01:15)    patient reports that she did not feel any pain until Sunday, and then the toe "Blew up"  patient had been seen by podiatry, s/p debridement of the RIGHT hallux  foul smelling per podiatry team.    Patient see's PA at office of Dr. Presley.    Impression:  - diabetes  - neuropathy  - right foot infection  Right foot osteomyelitis  - WBC elevation    Plan:    - OM confirm in right hallux distal phalanx    WOUND cx with Strep sanguinis  final culture with Staph aureus as well  Change Antibiotics:  ceFAZolin   IVPB 2000 milliGRAM(s) IV Intermittent every 8 hours    Follow up on Bone cx from 9/17    - A1c is elevated : on this patient  A1C with Estimated Average Glucose Result: 10.1 % (09-15-20 @ 08:04)  - will need good DM control      - WBC elevation from infection  - now resolved    - ESR and CRP ordered   Sedimentation Rate, Erythrocyte: 48 mm/hr (09-15-20 @ 08:04)

## 2020-09-18 NOTE — PROGRESS NOTE ADULT - SUBJECTIVE AND OBJECTIVE BOX
[facility]  INFECTIOUS DISEASES AND INTERNAL MEDICINE at [place]  =======================================================  Mitchell Brown MD  Diplomates American Board of Internal Medicine and Infectious Diseases  Tel  [phone]  Fax [phone]  =======================================================    [id number]  FARHAT SLADE   follow up:   RIGHT hallux infection    s/p Partial amputation of first ray of right foot by open approach  on 9/17      =======================================================  REVIEW OF SYSTEMS:  CONSTITUTIONAL:  No Fever or chills  HEENT:  No diplopia or blurred vision.  No earache, sore throat or runny nose.  CARDIOVASCULAR:  No pressure, squeezing, strangling, tightness, heaviness or aching about the chest, neck, axilla or epigastrium.  RESPIRATORY:  No cough, shortness of breath  GASTROINTESTINAL:  No nausea, vomiting or diarrhea.  GENITOURINARY:  No dysuria, frequency or urgency. No Blood in urine  MUSCULOSKELETAL:  no joint aches, no muscle pain  SKIN:  No change in skin, hair or nails.  NEUROLOGIC:  No Headaches, seizures or weakness.  PSYCHIATRIC:  No disorder of thought or mood.  ENDOCRINE:  No heat or cold intolerance  HEMATOLOGICAL:  No easy bruising or bleeding.   =======================================================    Allergies  Biaxin (Vomiting)        ======================================================  Physical Exam:  ============     General:  No acute distress.  Eye: Pupils are equal, round and reactive to light, Extraocular movements are intact, Normal conjunctiva.  HENT: Normocephalic, Oral mucosa is moist, No pharyngeal erythema, No sinus tenderness.  Neck: Supple, No lymphadenopathy.  Respiratory: Lungs are clear to auscultation, Respirations are non-labored.  Cardiovascular: Normal rate, Regular rhythm,   Gastrointestinal: Soft, Non-tender, Non-distended, Normal bowel sounds.  Genitourinary: No costovertebral angle tenderness.  Lymphatics: No lymphadenopathy neck,   Musculoskeletal: Normal range of motion, Normal strength.  Integumentary:  RIGHT HALLUX with  DRESSING IN PLACE  Neurologic: Alert, Oriented, No focal deficits, Cranial Nerves II-XII are grossly intact.  POOR SENSATION OF Pain on foot bilaterally.  Psychiatric: Appropriate mood & affect.    =======================================================  Vitals:  ============  T(F): 98.7 (18 Sep 2020 10:19), Max: 98.7 (18 Sep 2020 10:19)  HR: 80 (18 Sep 2020 10:19)  BP: 125/68 (18 Sep 2020 10:19)  RR: 16 (18 Sep 2020 10:19)  SpO2: 97% (18 Sep 2020 10:19) (94% - 100%)  temp max in last 48H T(F): , Max: 98.7 (09-18-20 @ 10:19)    =======================================================  Current Antibiotics:  ceFAZolin   IVPB 2000 milliGRAM(s) IV Intermittent every 8 hours    Other medications:  aspirin  chewable 81 milliGRAM(s) Oral daily  atorvastatin 20 milliGRAM(s) Oral at bedtime  heparin   Injectable 5000 Unit(s) SubCutaneous every 8 hours  influenza   Vaccine 0.5 milliLiter(s) IntraMuscular once  insulin glargine Injectable (LANTUS) 25 Unit(s) SubCutaneous at bedtime  insulin lispro (HumaLOG) corrective regimen sliding scale   SubCutaneous three times a day before meals  insulin lispro Injectable (HumaLOG) 10 Unit(s) SubCutaneous three times a day before meals  lisinopril 5 milliGRAM(s) Oral daily  saccharomyces boulardii 250 milliGRAM(s) Oral two times a day      =======================================================  Labs:                        12.4   10.68 )-----------( 237      ( 17 Sep 2020 07:47 )             38.6      09-17    138  |  102  |  11.0  ----------------------------<  249<H>  4.7   |  25.0  |  0.60    Ca    9.0      17 Sep 2020 07:47        Culture - Tissue with Gram Stain (collected 09-18-20 @ 03:24)  Source: .Tissue 1st metatarsal clear margin  Gram Stain (09-18-20 @ 07:40):    No polymorphonuclear cells seen per low power field    No organisms seen per oil power field    Culture - Surgical Swab (collected 09-14-20 @ 22:12)  Source: .Surgical Swab right hallux  Organism: Staphylococcus aureus (09-17-20 @ 18:23)  Organism: Staphylococcus aureus (09-17-20 @ 18:23)    Sensitivities:      -  Ampicillin/Sulbactam: S <=8/4      -  Cefazolin: S <=4      -  Clindamycin: S <=0.5      -  Erythromycin: S <=0.5      -  Gentamicin: S <=4 Should not be used as monotherapy      -  Oxacillin: S <=0.25      -  RIF- Rifampin: S <=1 Should not be used as monotherapy      -  Tetra/Doxy: S <=4      -  Trimethoprim/Sulfamethoxazole: S <=0.5/9.5      -  Vancomycin: S 1      Method Type: KAYLEY    Culture - Urine (collected 09-14-20 @ 08:14)  Source: .Urine Clean Catch (Midstream)  Final Report (09-15-20 @ 07:38):    No growth    Culture - Blood (collected 09-13-20 @ 21:18)  Source: .Blood Blood-Peripheral    Culture - Blood (collected 09-13-20 @ 20:26)  Source: .Blood Blood-Peripheral      Creatinine, Serum: 0.60 mg/dL (09-17-20 @ 07:47)  Creatinine, Serum: 0.64 mg/dL (09-16-20 @ 17:22)  Creatinine, Serum: 0.83 mg/dL (09-14-20 @ 02:25)  Creatinine, Serum: 0.94 mg/dL (09-13-20 @ 20:24)      WBC Count: 10.68 K/uL (09-17-20 @ 07:47)  WBC Count: 9.06 K/uL (09-16-20 @ 17:22)  WBC Count: 9.99 K/uL (09-15-20 @ 08:04)  WBC Count: 16.13 K/uL (09-14-20 @ 02:25)  WBC Count: 19.48 K/uL (09-13-20 @ 20:24)      COVID-19 PCR: NotDetec (09-13-20 @ 21:35)           < from: MR Foot No Cont, Right (09.16.20 @ 10:28) >     EXAM:  MR FOOT RT                          PROCEDURE DATE:  09/16/2020          INTERPRETATION:  Clinical indication: Toe swelling and discoloration with an open wound.    Multiplanar multisequence MRI of the right foot was performed from the level of the toes to the level of the talonavicular joint without intravenous contrast.    FINDINGS:    There is marked maceration and deficiency of the soft tissues along the medial plantar aspect of the first toe extending from the distal phalanx to theinterphalangeal joint. There appears to be exposure of the plantar aspect of the first distal phalanx. There is mild subcortical osseous edema involving the distal phalanx of the first toe with slightly decreased subcortical T1 marrow signal concerning for early osteomyelitis. There is diffuse cellulitis throughout the first toe which extends along the dorsal aspect of the foot. There is small fluid within the first toe flexor tendon sheath extending to the level of the midfoot consistent with mild tenosynovitis.    There is osseous edema centered about the second and third tarsometatarsal articulations consistent with osseous stress reaction. Osseous stress reaction about the second tarsometatarsal articulation extends from the base of the second metatarsal to the mid shaft. These findings are seen in the setting of moderate to severe arthrosis. There is no evidence of acute fracture.    There is mild hallux valgus with moderate first metatarsophalangeal and hallux sesamoid joint arthrosis. There is prominent subchondral cystic change along the plantar aspect of the first metatarsal head. There is a nonspecific moderate first metatarsal phalangeal joint effusion. Correlate clinically to exclude superimposed infection.    There is edema and fatty atrophy throughout the plantar musculature consistent with denervation related changes. No acute tendinous injury is demonstrated. The Lisfranc ligament is preserved.    IMPRESSION:    Extensive maceration and deficiency of the soft tissues about the medial plantar aspect of the first toe. Findings suggestive of early subcortical osteomyelitis along the plantar aspect of the distal phalanx.    Mild tenosynovitis within the first toe flexor tendon.    Moderate first metatarsal phalangeal joint arthrosis with prominent subchondral cystic change along the plantar aspect of the first metatarsal head. Nonspecific moderate first metatarsal phalangeal joint effusion. Correlate clinically to this exclude superimposed infection.    Prominent osseous stress reaction centered about the second and third tarsometatarsal articulations.         TANIKA HEIN M.D., ATTENDING RADIOLOGIST  This document has been electronically signed. Sep 16 2020 10:52AM    < end of copied text >

## 2020-09-18 NOTE — PROGRESS NOTE ADULT - SUBJECTIVE AND OBJECTIVE BOX
Internal Medicine Hospitalist Progress Note    Seen for toe infection , OM of great toe   s/p Partial amputation of first ray of right foot by open approach by podiatry 9/17     pt is seen in am, she is anxious upset wants to go home   she is provided with result of vaginal US and rec follow up after discharge became very worried and tearful emotional support given   gyn  oncology called to see her   consult note reviewed       Vital Signs Last 24 Hrs  T(C): 37.1 (18 Sep 2020 10:19), Max: 37.1 (18 Sep 2020 10:19)  T(F): 98.7 (18 Sep 2020 10:19), Max: 98.7 (18 Sep 2020 10:19)  HR: 80 (18 Sep 2020 10:19) (72 - 91)  BP: 125/68 (18 Sep 2020 10:19) (94/58 - 166/92)  BP(mean): 87 (18 Sep 2020 10:19) (87 - 110)  RR: 16 (18 Sep 2020 10:19) (16 - 22)  SpO2: 97% (18 Sep 2020 10:19) (94% - 100%)    PHYSICAL EXAM:      Constitutional: awake alert , no distress     Respiratory: CTA  bilateral     Cardiovascular: regular s1 /s2     Gastrointestinal: soft no tenderness , Bs positive     Extremities: no pretibial edema , right toe dressing in place     Psychiatric: anxious ,appropriate affect      CAPILLARY BLOOD GLUCOSE      POCT Blood Glucose.: 380 mg/dL (18 Sep 2020 08:22)  POCT Blood Glucose.: 298 mg/dL (17 Sep 2020 21:25)  POCT Blood Glucose.: 198 mg/dL (17 Sep 2020 16:59)  POCT Blood Glucose.: 182 mg/dL (17 Sep 2020 15:14)  POCT Blood Glucose.: 239 mg/dL (17 Sep 2020 12:30)    0 12:25)                                    12.4   10.68 )-----------( 237      ( 17 Sep 2020 07:47 )             38.6   09-17    138  |  102  |  11.0  ----------------------------<  249<H>  4.7   |  25.0  |  0.60    Ca    9.0      17 Sep 2020 07:47

## 2020-09-18 NOTE — CONSULT NOTE ADULT - SUBJECTIVE AND OBJECTIVE BOX
FARHAT SLADE is a 56yo  HD#4 for      LMP ~7 years prior, age 49y/o    ROS:  Gen: no fatigue  CV: no chest pain  Resp: no SOB, wheezing  Neuro: no vision change, headache  GI: no abdominal pain, diarrhea, constipation  : no dysuria, increased frequency, urge  Gyn: no vaginal bleeding, abnormal discharge  ID: no fevers, chills  Int: no rash  MSK: no weakness    PAST MEDICAL & SURGICAL HISTORY:  Hypercholesteremia    TIA (transient ischemic attack)    DM (diabetes mellitus)    S/P right hemicolectomy    S/P repair of ventral hernia      ObHx:  GynHx:      Menarchy:           Period:   last Pap:   , no h/o abnormal Paps   denies h/o fibroids, cysts, STIs   not sexually active  FamHx: FAMILY HISTORY:  No pertinent family history in first degree relatives      SocHx:  Allergies    Biaxin (Vomiting)    Intolerances      Meds:  aspirin  chewable 81 milliGRAM(s) Oral daily  atorvastatin 20 milliGRAM(s) Oral at bedtime  dextrose 40% Gel 15 Gram(s) Oral once PRN  dextrose 40% Gel 15 Gram(s) Oral once PRN  heparin   Injectable 5000 Unit(s) SubCutaneous every 8 hours  influenza   Vaccine 0.5 milliLiter(s) IntraMuscular once  insulin glargine Injectable (LANTUS) 20 Unit(s) SubCutaneous at bedtime  insulin lispro (HumaLOG) corrective regimen sliding scale   SubCutaneous three times a day before meals  insulin lispro Injectable (HumaLOG) 10 Unit(s) SubCutaneous three times a day before meals  lisinopril 5 milliGRAM(s) Oral daily  ondansetron Injectable 4 milliGRAM(s) IV Push every 6 hours PRN  piperacillin/tazobactam IVPB.. 3.375 Gram(s) IV Intermittent every 8 hours  saccharomyces boulardii 250 milliGRAM(s) Oral two times a day  Home Medications:  atorvastatin 20 mg oral tablet: 1 tab(s) orally once a day (14 Sep 2020 08:38)  glipiZIDE 10 mg oral tablet: 1 tab(s) orally once a day (14 Sep 2020 08:38)  lisinopril-hydrochlorothiazide 10 mg-12.5 mg oral tablet: 1 tab(s) orally once a day (14 Sep 2020 08:38)  metFORMIN 500 mg oral tablet: 1 tab(s) orally 2 times a day (14 Sep 2020 08:38)    Health Maintenance:         T(C): 36.7 (20 @ 05:08), Max: 36.7 (20 @ 19:15)  HR: 91 (20 @ 05:08) (72 - 91)  BP: 147/83 (20 @ 05:08) (94/58 - 166/92)  RR: 18 (20 @ 05:08) (16 - 22)  SpO2: 100% (20 @ 05:08) (94% - 100%)    Physical Exam:  Gen: alert oriented no acute distress  HEENT: atraumatic, normocephalic  CV: Regular rate rhythm  Pulm: clear to auscultation bilaterally  Abd: soft non-tender, no surgical incisions,+ BS  : no CVA tenderness  Gyn:    external genitalia normal in apperance no lesions    on speculum exam no abnormal lesions visualized, no abnormal discharge or bleeding    on bimanual exam small anteverted uterus no adnexal masses/tenderness .  no CMT.  os closed.  Msk: no erythema or swelling          138  |  102  |  11.0  ----------------------------<  249<H>  4.7   |  25.0  |  0.60    Ca    9.0      17 Sep 2020 07:47             FARHAT SLADE is a 56yo  HD#4 for uncontrolled R toe amputation      LMP ~7 years prior, age 49y/o      PAST MEDICAL HISTORY:  Hypercholesteremia  TIA (transient ischemic attack)  DM (diabetes mellitus)  HTN    PSH:  R toe amputation this admission   S/P right hemicolectomy   S/P repair of ventral hernia       ObHx:  G4, full term, , uncomplicated    GynHx:      Menarchy:  approx 11 years old         Period: regular cycles, 3-4 days   last Pap:  ~5years , denies h/o abnormal Paps   denies h/o fibroids, cysts, STIs   not sexually active    FamHx: FAMILY HISTORY:  sister diagnosed with pancreatic cancer last week  Denies family history of gynecologic or colon cancer    SocHx:  Allergies    Biaxin (Vomiting)    Intolerances    ROS:  Gen: no fatigue  CV: no chest pain  Resp: no SOB, wheezing  Neuro: no vision change, headache  GI: no abdominal pain, diarrhea, constipation  : no dysuria, increased frequency, urge  Gyn: no vaginal bleeding, abnormal discharge  ID: no fevers, chills  Int: no rash  MSK: no weakness        Meds:  aspirin  chewable 81 milliGRAM(s) Oral daily  atorvastatin 20 milliGRAM(s) Oral at bedtime  dextrose 40% Gel 15 Gram(s) Oral once PRN  dextrose 40% Gel 15 Gram(s) Oral once PRN  heparin   Injectable 5000 Unit(s) SubCutaneous every 8 hours  influenza   Vaccine 0.5 milliLiter(s) IntraMuscular once  insulin glargine Injectable (LANTUS) 20 Unit(s) SubCutaneous at bedtime  insulin lispro (HumaLOG) corrective regimen sliding scale   SubCutaneous three times a day before meals  insulin lispro Injectable (HumaLOG) 10 Unit(s) SubCutaneous three times a day before meals  lisinopril 5 milliGRAM(s) Oral daily  ondansetron Injectable 4 milliGRAM(s) IV Push every 6 hours PRN  piperacillin/tazobactam IVPB.. 3.375 Gram(s) IV Intermittent every 8 hours  saccharomyces boulardii 250 milliGRAM(s) Oral two times a day  Home Medications:  atorvastatin 20 mg oral tablet: 1 tab(s) orally once a day (14 Sep 2020 08:38)  glipiZIDE 10 mg oral tablet: 1 tab(s) orally once a day (14 Sep 2020 08:38)  lisinopril-hydrochlorothiazide 10 mg-12.5 mg oral tablet: 1 tab(s) orally once a day (14 Sep 2020 08:38)  metFORMIN 500 mg oral tablet: 1 tab(s) orally 2 times a day (14 Sep 2020 08:38)    Health Maintenance:         T(C): 36.7 (20 @ 05:08), Max: 36.7 (20 @ 19:15)  HR: 91 (20 @ 05:08) (72 - 91)  BP: 147/83 (20 @ 05:08) (94/58 - 166/92)  RR: 18 (20 @ 05:08) (16 - 22)  SpO2: 100% (20 @ 05:08) (94% - 100%)    Physical Exam:  Gen: alert oriented no acute distress  HEENT: atraumatic, normocephalic  CV: Regular rate rhythm  Pulm: clear to auscultation bilaterally  Abd: soft non-tender, no surgical incisions,+ BS  : no CVA tenderness  Gyn:    external genitalia normal in apperance no lesions    on speculum exam no abnormal lesions visualized, no abnormal discharge or bleeding    on bimanual exam small anteverted uterus no adnexal masses/tenderness .  no CMT.  os closed.  Msk: no erythema or swelling          138  |  102  |  11.0  ----------------------------<  249<H>  4.7   |  25.0  |  0.60    Ca    9.0      17 Sep 2020 07:47             FARHAT SLADE is a 56yo  HD#4 for sepsis 2/2 infected R 1st digital DM ulcer, now POD#1 from  R first ray toe amputation    Gynecology consulted for incidental finding of right ovarian cyst on CT 2020 7.9x5.8x7cm, increased in size compared to a previous CT done in 2019 showing approx 3cm cyst. Cyst poorly visualized on transvaginal ultrasound.    Upon evaluation, patient tearful and anxious about incidental finding.  Denies abdominal pain, bloating, swelling, h/o postmenopausal bleeding.  Denies fatigue, weight gain, weight loss, night sweats, fevers, myalgias.      Past Gyn history:  Previously receiving gyn care at [facility], estimates her last visit was about 5 years ago, with last pap and mammogram done at the time.  denies history of abnormal paps or mammograms     Menarchy:  approx 11 years old         Period: regular cycles, 3-4 days  Menopause approx 7 years prior, age 47y/o  Denies hx of fibroids, ovarian cysts, endometriosis, STIs    Has never undergone colonoscopy    PAST MEDICAL HISTORY:  Hypercholesteremia  TIA (transient ischemic attack)  DM (diabetes mellitus)  HTN    PSH:  R toe amputation this admission   S/P right hemicolectomy 2019  S/P repair of ventral hernia 2019      ObHx:  G4, full term, , uncomplicated        FamHx: FAMILY HISTORY:  sister diagnosed with pancreatic cancer last week  Denies family history of gynecologic or colon cancer    SocHx: on-and-off tobacco smoker since high school, half a pack a day ( approx 20 pack year history); social alcohol use 1x/wk, no other substances    Allergies: Biaxin (Vomiting)        ROS:  Gen: no fatigue  CV: no chest pain  Resp: no SOB, wheezing  Neuro: no vision change, headache  GI: no abdominal pain, diarrhea, constipation  : no dysuria, increased frequency, urge  Gyn: no vaginal bleeding, abnormal discharge  ID: no fevers, chills  Int: no rash  MSK: no weakness        Meds:  aspirin  chewable 81 milliGRAM(s) Oral daily  atorvastatin 20 milliGRAM(s) Oral at bedtime  dextrose 40% Gel 15 Gram(s) Oral once PRN  dextrose 40% Gel 15 Gram(s) Oral once PRN  heparin   Injectable 5000 Unit(s) SubCutaneous every 8 hours  influenza   Vaccine 0.5 milliLiter(s) IntraMuscular once  insulin glargine Injectable (LANTUS) 20 Unit(s) SubCutaneous at bedtime  insulin lispro (HumaLOG) corrective regimen sliding scale   SubCutaneous three times a day before meals  insulin lispro Injectable (HumaLOG) 10 Unit(s) SubCutaneous three times a day before meals  lisinopril 5 milliGRAM(s) Oral daily  ondansetron Injectable 4 milliGRAM(s) IV Push every 6 hours PRN  piperacillin/tazobactam IVPB.. 3.375 Gram(s) IV Intermittent every 8 hours  saccharomyces boulardii 250 milliGRAM(s) Oral two times a day  Home Medications:  atorvastatin 20 mg oral tablet: 1 tab(s) orally once a day (14 Sep 2020 08:38)  glipiZIDE 10 mg oral tablet: 1 tab(s) orally once a day (14 Sep 2020 08:38)  lisinopril-hydrochlorothiazide 10 mg-12.5 mg oral tablet: 1 tab(s) orally once a day (14 Sep 2020 08:38)  metFORMIN 500 mg oral tablet: 1 tab(s) orally 2 times a day (14 Sep 2020 08:38)    Health Maintenance:         T(C): 36.7 (20 @ 05:08), Max: 36.7 (20 @ 19:15)  HR: 91 (20 @ 05:08) (72 - 91)  BP: 147/83 (20 @ 05:08) (94/58 - 166/92)  RR: 18 (20 @ 05:08) (16 - 22)  SpO2: 100% (20 @ 05:08) (94% - 100%)    Physical Exam:  Gen: alert oriented no acute distress  CV: Regular rate rhythm  Pulm: clear to auscultation bilaterally  Abd: soft non-tender, + BS  Gyn:    external genitalia normal in appearance, no lesions    on speculum exam no abnormal lesions visualized, no abnormal discharge or bleeding    on bimanual exam small anteverted uterus; due to large body habitus could not feel adnexa; no adnexal tenderness.  no CMT.  os closed.          138  |  102  |  11.0  ----------------------------<  249<H>  4.7   |  25.0  |  0.60    Ca    9.0      17 Sep 2020 07:47             FARHAT SLADE is a 56yo  HD#4 for sepsis 2/2 infected R 1st digital DM ulcer, now POD#1 from  R first ray toe amputation    Gynecology consulted for incidental finding of right ovarian cyst on CT 2020 7.9x5.8x7cm, increased in size compared to a previous CT done in 2019 showing approx 3cm cyst. Cyst poorly visualized on transvaginal ultrasound.    Upon evaluation, patient tearful and anxious about incidental finding.  Denies abdominal pain, bloating, swelling, h/o postmenopausal bleeding.  Denies fatigue, weight gain, weight loss, night sweats, fevers, myalgias.      Past Gyn history:  Previously receiving gyn care at [facility], estimates her last visit was about 5 years ago, with last pap and mammogram done at the time.  denies history of abnormal paps or mammograms    Menarchy:  approx 11 years old         Period: regular cycles, 3-4 days  Menopause approx 7 years prior, age 49y/o  Denies hx of fibroids, ovarian cysts, endometriosis, STIs    Has never undergone colonoscopy  last mammogram 5 years ago    PAST MEDICAL HISTORY:  Hypercholesteremia  TIA (transient ischemic attack)  DM (diabetes mellitus)  HTN    PSH:  R toe amputation this admission   S/P right hemicolectomy 2019  S/P repair of ventral hernia 2019      ObHx:  G4, full term, , uncomplicated        FamHx: FAMILY HISTORY:  sister diagnosed with pancreatic cancer last week  Denies family history of gynecologic or colon cancer    SocHx: on-and-off tobacco smoker since high school, half a pack a day (approx 20 pack year history); social alcohol use 1x/wk, no other substances    Allergies: Biaxin (Vomiting)        ROS:  Gen: no fatigue  CV: no chest pain  Resp: no SOB, wheezing  Neuro: no vision change, headache  GI: no abdominal pain, diarrhea, constipation  : no dysuria, increased frequency, urge  Gyn: no vaginal bleeding, abnormal discharge  ID: no fevers, chills  Int: no rash  MSK: no weakness        Meds:  aspirin  chewable 81 milliGRAM(s) Oral daily  atorvastatin 20 milliGRAM(s) Oral at bedtime  dextrose 40% Gel 15 Gram(s) Oral once PRN  dextrose 40% Gel 15 Gram(s) Oral once PRN  heparin   Injectable 5000 Unit(s) SubCutaneous every 8 hours  influenza   Vaccine 0.5 milliLiter(s) IntraMuscular once  insulin glargine Injectable (LANTUS) 20 Unit(s) SubCutaneous at bedtime  insulin lispro (HumaLOG) corrective regimen sliding scale   SubCutaneous three times a day before meals  insulin lispro Injectable (HumaLOG) 10 Unit(s) SubCutaneous three times a day before meals  lisinopril 5 milliGRAM(s) Oral daily  ondansetron Injectable 4 milliGRAM(s) IV Push every 6 hours PRN  piperacillin/tazobactam IVPB.. 3.375 Gram(s) IV Intermittent every 8 hours  saccharomyces boulardii 250 milliGRAM(s) Oral two times a day  Home Medications:  atorvastatin 20 mg oral tablet: 1 tab(s) orally once a day (14 Sep 2020 08:38)  glipiZIDE 10 mg oral tablet: 1 tab(s) orally once a day (14 Sep 2020 08:38)  lisinopril-hydrochlorothiazide 10 mg-12.5 mg oral tablet: 1 tab(s) orally once a day (14 Sep 2020 08:38)  metFORMIN 500 mg oral tablet: 1 tab(s) orally 2 times a day (14 Sep 2020 08:38)    Health Maintenance:         T(C): 36.7 (20 @ 05:08), Max: 36.7 (20 @ 19:15)  HR: 91 (20 @ 05:08) (72 - 91)  BP: 147/83 (20 @ 05:08) (94/58 - 166/92)  RR: 18 (20 @ 05:08) (16 - 22)  SpO2: 100% (20 @ 05:08) (94% - 100%)    Physical Exam:  Gen: alert oriented, anxious appearing  CV: Regular rate rhythm  Pulm: clear to auscultation bilaterally  Abd: soft non-tender, + BS  Gyn:    external genitalia normal in appearance, no lesions    on speculum exam no abnormal lesions visualized, no abnormal discharge or bleeding    on bimanual exam unable to palpate size of uterus due to large body habitus, could not feel adnexa; no adnexal tenderness.  no CMT.  os closed.          138  |  102  |  11.0  ----------------------------<  249<H>  4.7   |  25.0  |  0.60    Ca    9.0      17 Sep 2020 07:47

## 2020-09-18 NOTE — CONSULT NOTE ADULT - ASSESSMENT
FARHAT SLADE is a 56yo  HD#4; gyn consulted for R ovarian cyst incidentally found on CT, increased in size from previous CT 2019    HD#4 for sepsis 2/2 infected R 1st digital DM ulcer, now POD#1 from  R first ray toe amputation      -per primary team, plan is for patient to remain inpatient pending cultures and tailoring antibiotics  -ordered tumor markers  and HE4; once resulted, will calculate JUAN Score (Ovarian malignancy Risk Algorithm)  -pending MRI abdomen/pelvis with contrast for better characterization of pelvic mass  -pending MRI and JUAN score, possibly send other tumor markers over the weekend while inpatient  -pt encouraged to make close outpatient appointment with GYN provider (Ivory HINKLE) for further workup and if necessary, referral to gynecology oncology    Discussed with Dr Hurd

## 2020-09-19 LAB
CANCER AG125 SERPL-ACNC: 12 U/ML — SIGNIFICANT CHANGE UP
GLUCOSE BLDC GLUCOMTR-MCNC: 164 MG/DL — HIGH (ref 70–99)
GLUCOSE BLDC GLUCOMTR-MCNC: 191 MG/DL — HIGH (ref 70–99)
GLUCOSE BLDC GLUCOMTR-MCNC: 226 MG/DL — HIGH (ref 70–99)
GLUCOSE BLDC GLUCOMTR-MCNC: 237 MG/DL — HIGH (ref 70–99)

## 2020-09-19 PROCEDURE — 99232 SBSQ HOSP IP/OBS MODERATE 35: CPT

## 2020-09-19 RX ADMIN — Medication 10 UNIT(S): at 08:21

## 2020-09-19 RX ADMIN — Medication 81 MILLIGRAM(S): at 11:57

## 2020-09-19 RX ADMIN — Medication 10 UNIT(S): at 18:06

## 2020-09-19 RX ADMIN — HEPARIN SODIUM 5000 UNIT(S): 5000 INJECTION INTRAVENOUS; SUBCUTANEOUS at 05:46

## 2020-09-19 RX ADMIN — Medication 2: at 12:28

## 2020-09-19 RX ADMIN — HEPARIN SODIUM 5000 UNIT(S): 5000 INJECTION INTRAVENOUS; SUBCUTANEOUS at 14:00

## 2020-09-19 RX ADMIN — Medication 4: at 08:22

## 2020-09-19 RX ADMIN — ATORVASTATIN CALCIUM 20 MILLIGRAM(S): 80 TABLET, FILM COATED ORAL at 21:39

## 2020-09-19 RX ADMIN — Medication 100 MILLIGRAM(S): at 21:39

## 2020-09-19 RX ADMIN — HEPARIN SODIUM 5000 UNIT(S): 5000 INJECTION INTRAVENOUS; SUBCUTANEOUS at 21:39

## 2020-09-19 RX ADMIN — Medication 100 MILLIGRAM(S): at 05:46

## 2020-09-19 RX ADMIN — LISINOPRIL 5 MILLIGRAM(S): 2.5 TABLET ORAL at 05:46

## 2020-09-19 RX ADMIN — Medication 100 MILLIGRAM(S): at 14:00

## 2020-09-19 RX ADMIN — Medication 10 UNIT(S): at 12:28

## 2020-09-19 RX ADMIN — INSULIN GLARGINE 25 UNIT(S): 100 INJECTION, SOLUTION SUBCUTANEOUS at 21:38

## 2020-09-19 RX ADMIN — Medication 4: at 18:07

## 2020-09-19 NOTE — PROGRESS NOTE ADULT - SUBJECTIVE AND OBJECTIVE BOX
Patient is a 55y old  Female who presents with a chief complaint of Sepsis 2/2 Infected R 1st digit DM Ulcer (14 Sep 2020 01:15)      HPI:  56 yo female with history of DM type 2, TIA, HLD, R hemicolectomy, primary anastomosis, ventral hernia repair with biologic mesh for strangulated ventral hernia who now presents with 'blood blister' on toe and sepsis with leukocytosis, tachycardia and low grade temperature. Patient reports she had 1 month ago removal of a wart on her right 1st toe with Dr. Mccollum podiatry, has not had issues with it since but yesterday noted that after stepping out of the shower she has blister on her toe. She called her podiatrist that night to make appointment and ended having nausea and vomiting. The following day she had enlargement of her toe and fever at home. She took tyelnol and came to the ED.    In the ED for N/V ED ordered CT abdomen that did not show obstruction but did show large ventral wall seroma. At this time she has no complaints of abdominal pain or further N/V. She is feeling better in the ED. She denies any other infectious symptomatology. Denies chest pain, SOB, cough. +fever/chills. She received 1 L of LR and vancomycin and zosyn.   (14 Sep 2020 01:15)    History as above. Pt seen bedside in NAD. States that she has a blood blister on her R big toe for several days and has gotten worse and more swollen. Adds she used to follow a podiatrist but has not seen for a few months. Came to ED for fever and nausea. Denies any pain to the foot.     Podiatry HPI: Patient seen bedside S/p R foot partial 1st ray amputation 9/17. She states that she mostly sits in her chair with her foot in a dependent position and notices increased swelling in her foot. She denies pain. . Pt denies any pain currently. Denies f/c/n/v/sob      Medications aspirin  chewable 81 milliGRAM(s) Oral daily  atorvastatin 20 milliGRAM(s) Oral at bedtime  ceFAZolin   IVPB 2000 milliGRAM(s) IV Intermittent every 8 hours  dextrose 40% Gel 15 Gram(s) Oral once PRN  dextrose 40% Gel 15 Gram(s) Oral once PRN  heparin   Injectable 5000 Unit(s) SubCutaneous every 8 hours  influenza   Vaccine 0.5 milliLiter(s) IntraMuscular once  insulin glargine Injectable (LANTUS) 25 Unit(s) SubCutaneous at bedtime  insulin lispro (HumaLOG) corrective regimen sliding scale   SubCutaneous three times a day before meals  insulin lispro Injectable (HumaLOG) 10 Unit(s) SubCutaneous three times a day before meals  lisinopril 5 milliGRAM(s) Oral daily  ondansetron Injectable 4 milliGRAM(s) IV Push every 6 hours PRN    FHNo pertinent family history in first degree relatives    ,   PMHHypercholesteremia    TIA (transient ischemic attack)    DM (diabetes mellitus)       PSHS/P right hemicolectomy    S/P repair of ventral hernia        Labs  Vital Signs Last 24 Hrs  T(C): 37.1 (19 Sep 2020 16:25), Max: 37.2 (19 Sep 2020 12:00)  T(F): 98.8 (19 Sep 2020 16:25), Max: 99 (19 Sep 2020 12:00)  HR: 101 (19 Sep 2020 16:25) (84 - 101)  BP: 162/87 (19 Sep 2020 16:25) (128/70 - 162/87)  BP(mean): --  RR: 18 (19 Sep 2020 16:25) (18 - 18)  SpO2: 97% (19 Sep 2020 16:25) (95% - 100%)  Sedimentation Rate, Erythrocyte: 48 mm/hr (09-15-20 @ 08:04)                    PHYSICAL EXAM  GEN: FARHAT SLADE is a pleasant well-nourished, well developed 55y Female in no acute distress, alert awake, and oriented to person, place and time.   LE Focused:    Vasc: DP/PT palpable, CFT Brisk to digits x9,   Derm: Right foot surgical site with blood soaked bandage, sutures intact, skin well coapted proximally with dehiscence distally, mild maceration to edges, no drainage, mild sandee-incision erythema, no clinical sign of infection    Neuro: protective sensation diminished   MSK: no gross deformity noted.     Image:    EXAM:  TOE(S)-RIGHT FOOT                          PROCEDURE DATE:  09/13/2020          INTERPRETATION:  AP, lateral, and oblique views of the RIGHT first hallux are submitted.    Clinical data; trauma with pain.    There is diffuse soft tissue swelling of the first toe with subcutaneous and air collections on the plantar surface. Adjacent to distal and proximal first digit osseous structures show no gross of fracture or osteolytic lesion./  IMPRESSION: Diffuse soft tissue swelling with subcutaneous air. No fracture or gross osteolytic lesion.    If osteomyelitis is considered, despite conservative therapy, and soft tissue / bone infection requires further assessment, follow-up MRI recommended.        YIFAN PUENTES M.D., ATTENDING RADIOLOGIST  This document has been electronically signed. Sep 14 2020  1:48PM    < from: MR Foot No Cont, Right (09.16.20 @ 10:28) >     EXAM:  MR FOOT RT                          PROCEDURE DATE:  09/16/2020          INTERPRETATION:  Clinical indication: Toe swelling and discoloration with an open wound.    Multiplanar multisequence MRI of the right foot was performed from the level of the toes to the level of the talonavicular joint without intravenous contrast.    FINDINGS:    There is marked maceration and deficiency of the soft tissues along the medial plantar aspect of the first toe extending from the distal phalanx to theinterphalangeal joint. There appears to be exposure of the plantar aspect of the first distal phalanx. There is mild subcortical osseous edema involving the distal phalanx of the first toe with slightly decreased subcortical T1 marrow signal concerning for early osteomyelitis. There is diffuse cellulitis throughout the first toe which extends along the dorsal aspect of the foot. There is small fluid within the first toe flexor tendon sheath extending to the level of the midfoot consistent with mild tenosynovitis.    There is osseous edema centered about the second and third tarsometatarsal articulations consistent with osseous stress reaction. Osseous stress reaction about the second tarsometatarsal articulation extends from the base of the second metatarsal to the mid shaft. These findings are seen in the setting of moderate to severe arthrosis. There is no evidence of acute fracture.    There is mild hallux valgus with moderate first metatarsophalangeal and hallux sesamoid joint arthrosis. There is prominent subchondral cystic change along the plantar aspect of the first metatarsal head. There is a nonspecific moderate first metatarsal phalangeal joint effusion. Correlate clinically to exclude superimposed infection.    There is edema and fatty atrophy throughout the plantar musculature consistent with denervation related changes. No acute tendinous injury is demonstrated. The Lisfranc ligament is preserved.    IMPRESSION:    Extensive maceration and deficiency of the soft tissues about the medial plantar aspect of the first toe. Findings suggestive of early subcortical osteomyelitis along the plantar aspect of the distal phalanx.    Mild tenosynovitis within the first toe flexor tendon.    Moderate first metatarsal phalangeal joint arthrosis with prominent subchondral cystic change along the plantar aspect of the first metatarsal head. Nonspecific moderate first metatarsal phalangeal joint effusion. Correlate clinically to this exclude superimposed infection.    Prominent osseous stress reaction centered about the second and third tarsometatarsal articulations.          TANIKA HEIN M.D., ATTENDING RADIOLOGIST  This document has been electronically signed. Sep 16 2020 10:52AM    < end of copied text >      Culture - Surgical Swab (09.14.20 @ 22:12)    Specimen Source: .Surgical Swab right hallux    Culture Results:   Numerous Streptococcus anginosus  Few Corynebacterium species  "Susceptibilities not performed"    < from: Xray Foot AP + Lateral + Oblique, Right (09.17.20 @ 15:50) >     EXAM:  FOOT-RIGHT                          PROCEDURE DATE:  09/17/2020          INTERPRETATION:  Right foot. 3 views postop.    There is been amputation at the mid shaft of the first metatarsal new since September 14.    There is a small inferior calcaneal spur.    IMPRESSION: New amputation as above.      YIFAN LONG M.D., ATTENDING RADIOLOGIST  This document has been electronically signed. Sep 17 2020  4:37PM    < end of copied text >    OR Path and Culture pending    A:  R partial 1st ray amputation 9/17  R hallux gas gangrene s/p bedside I&D 9/14  R hallux OM  R hallux ulceration    P:  Patient evaluated, chart reviewed  MRI as above - OM   Culture taken - reviewed as above  OR path and culture pending  Applied betadin, DSD, ACE to Right foot   WB as tolerated to R heel in surgical shoe  Recommend PT consult  Podiatry to follow while in house  Discussed with Dr. Yoo     Wound care instructions:  1. Apply xeroform, 4x4 gauze and place over wound  2. Secure with Kerlix wrap and ACE  3. Change every other day

## 2020-09-19 NOTE — DIETITIAN INITIAL EVALUATION ADULT. - ADD RECOMMEND
encourage pt follow up with an out pt RD for improved A1C. Provide HBV protein sources to aid in wound healing

## 2020-09-19 NOTE — DIETITIAN INITIAL EVALUATION ADULT. - OTHER INFO
54 yo female with history of DM type 2, TIA, HLD, R hemicolectomy, primary anastomosis, ventral hernia repair with biologic mesh for strangulated ventral hernia who now presents with 'blood blister' on toe and sepsis with leukocytosis, tachycardia and low grade temperature.   Acute OM of right great toe, s/p toe amputation. Discussed diabetic, Dash diet.  bringing in food from outside today. A1C noted 10.1 and encouraged to follow up with an out pt RD. Pt with fair po intake. Pt with 2+ right foot edema. Bedscale weight is 242#, admission weight 232#.  Pt seemed willing to change her lifestyle and was encouraged to do so to decrease further diabetic complications.

## 2020-09-19 NOTE — PROGRESS NOTE ADULT - ASSESSMENT
54 yo female with history of DM type 2, TIA, HLD, R hemicolectomy, primary anastomosis, ventral hernia repair with biologic mesh for strangulated ventral hernia who now presents with 'blood blister' on toe and sepsis with leukocytosis, tachycardia and low grade temperature. Patient reports she had 1 month ago removal of a wart on her right 1st toe with Dr. Mccollum podiatry, has not had issues with it since but yesterday noted that after stepping out of the shower she has blister on her toe. She called her podiatrist that night to make appointment and ended having nausea and vomiting. The following day she had enlargement of her toe and fever at home.  In the ED for N/V ED ordered CT abdomen that did not show obstruction but did show large ventral wall seroma, seen by surgery no sign of infection , +fever/chills. She received 1 L of LR and vancomycin and zosyn for supected toe infection , seen by podiatry s/p bedside debridment , ID on borad MR of foot positive for acute OM , hyperglycemia insulin regimen adjusted for better control , post op toe amputation by podiatry , gyn oncology consulted for incidental R ovarian cyst seen on CT of abd , PICC line insertion for iv abx       1-Acute OM of right great toe   s/p toea amputation   wound cx from 9/14 positive strep and staff   cont iv abx Cefazolin per ID    wound care per podiatry     PICC line insertion is pending for iv abx on discharge   she wants to go home     2- Diabetes Mellitus type2 , poorly controlled with hyperglycemia  lantus and premeals insulin dose adjusted   BG improving     3- Right ovarian cyst incidental findings   gyn oncology consulted   tumor markers and MR ordered     4-  HTN   cont lisinopril    controlled     5- Hyponatremia on admission   improved s/p fluid     6- Morbid obesity   will benefit from life style changes weight loss, exercise         discharge likely monday if PICC line and iv abx set up   she wants to go home

## 2020-09-19 NOTE — PROGRESS NOTE ADULT - SUBJECTIVE AND OBJECTIVE BOX
Internal Medicine Hospitalist Progress Note    Follow up for Right  toe infection , OM of great toe s/p amputation   Pt needs PICC line   she is upset due to incidental ovarian cyst finding and worried about cancer   emotional support is given   denies pain , explained to her discharge planning and estimated day pending PICC felicia         Constitutional: awake alert , upset     Respiratory: CTA  bilateral     Cardiovascular: regular s1 /s2     Gastrointestinal: soft no tenderness , Bs positive     Extremities: no pretibial edema , right foot  dressing in place         CAPILLARY BLOOD GLUCOSE      POCT Blood Glucose.: 226 mg/dL (19 Sep 2020 08:17)  POCT Blood Glucose.: 138 mg/dL (18 Sep 2020 21:57)  POCT Blood Glucose.: 128 mg/dL (18 Sep 2020 17:07)  POCT Blood Glucose.: 167 mg/dL (18 Sep 2020 12:18)        ulCulture - Tissue with Gram Stain (09.18.20 @ 03:24)   Gram Stain:   No polymorphonuclear cells seen per low power field   No organisms seen per oil power field   Specimen Source: .Tissue 1st metatarsal clear margin   Culture Results:   No growth Culture - Surgical Swab (09.14.20 @ 22:12)   - Ampicillin/Sulbactam: S <=8/4   - Cefazolin: S <=4   - Clindamycin: S <=0.5   - Erythromycin: S <=0.5   - Gentamicin: S <=4 Should not be used as monotherapy   - Oxacillin: S <=0.25   - RIF- Rifampin: S <=1 Should not be used as monotherapy   - Tetra/Doxy: S <=4   - Trimethoprim/Sulfamethoxazole: S <=0.5/9.5   - Vancomycin: S 1   Specimen Source: .Surgical Swab right hallux   Culture Results:   Numerous Streptococcus anginosus "Susceptibilities not performed"   Few Corynebacterium species "Susceptibilities not performed"   Few Staphylococcus aureus   Organism Identification: Staphylococcus aureus   Organism: Staphylococcus aureus   Method Type: KAYLEY

## 2020-09-20 ENCOUNTER — TRANSCRIPTION ENCOUNTER (OUTPATIENT)
Age: 55
End: 2020-09-20

## 2020-09-20 LAB
ALBUMIN SERPL ELPH-MCNC: 3.1 G/DL — LOW (ref 3.3–5.2)
ALP SERPL-CCNC: 365 U/L — HIGH (ref 40–120)
ALT FLD-CCNC: 49 U/L — HIGH
ANION GAP SERPL CALC-SCNC: 14 MMOL/L — SIGNIFICANT CHANGE UP (ref 5–17)
AST SERPL-CCNC: 50 U/L — HIGH
BILIRUB SERPL-MCNC: 0.3 MG/DL — LOW (ref 0.4–2)
BUN SERPL-MCNC: 19 MG/DL — SIGNIFICANT CHANGE UP (ref 8–20)
CALCIUM SERPL-MCNC: 9.2 MG/DL — SIGNIFICANT CHANGE UP (ref 8.6–10.2)
CHLORIDE SERPL-SCNC: 100 MMOL/L — SIGNIFICANT CHANGE UP (ref 98–107)
CO2 SERPL-SCNC: 22 MMOL/L — SIGNIFICANT CHANGE UP (ref 22–29)
CREAT SERPL-MCNC: 0.59 MG/DL — SIGNIFICANT CHANGE UP (ref 0.5–1.3)
GLUCOSE BLDC GLUCOMTR-MCNC: 176 MG/DL — HIGH (ref 70–99)
GLUCOSE BLDC GLUCOMTR-MCNC: 187 MG/DL — HIGH (ref 70–99)
GLUCOSE BLDC GLUCOMTR-MCNC: 214 MG/DL — HIGH (ref 70–99)
GLUCOSE BLDC GLUCOMTR-MCNC: 266 MG/DL — HIGH (ref 70–99)
GLUCOSE SERPL-MCNC: 267 MG/DL — HIGH (ref 70–99)
HCT VFR BLD CALC: 39.5 % — SIGNIFICANT CHANGE UP (ref 34.5–45)
HGB BLD-MCNC: 13 G/DL — SIGNIFICANT CHANGE UP (ref 11.5–15.5)
MAGNESIUM SERPL-MCNC: 1.9 MG/DL — SIGNIFICANT CHANGE UP (ref 1.6–2.6)
MCHC RBC-ENTMCNC: 31 PG — SIGNIFICANT CHANGE UP (ref 27–34)
MCHC RBC-ENTMCNC: 32.9 GM/DL — SIGNIFICANT CHANGE UP (ref 32–36)
MCV RBC AUTO: 94.3 FL — SIGNIFICANT CHANGE UP (ref 80–100)
PHOSPHATE SERPL-MCNC: 3.5 MG/DL — SIGNIFICANT CHANGE UP (ref 2.4–4.7)
PLATELET # BLD AUTO: 322 K/UL — SIGNIFICANT CHANGE UP (ref 150–400)
POTASSIUM SERPL-MCNC: 4.3 MMOL/L — SIGNIFICANT CHANGE UP (ref 3.5–5.3)
POTASSIUM SERPL-SCNC: 4.3 MMOL/L — SIGNIFICANT CHANGE UP (ref 3.5–5.3)
PROT SERPL-MCNC: 6.9 G/DL — SIGNIFICANT CHANGE UP (ref 6.6–8.7)
RBC # BLD: 4.19 M/UL — SIGNIFICANT CHANGE UP (ref 3.8–5.2)
RBC # FLD: 12.7 % — SIGNIFICANT CHANGE UP (ref 10.3–14.5)
SODIUM SERPL-SCNC: 136 MMOL/L — SIGNIFICANT CHANGE UP (ref 135–145)
WBC # BLD: 11.25 K/UL — HIGH (ref 3.8–10.5)
WBC # FLD AUTO: 11.25 K/UL — HIGH (ref 3.8–10.5)

## 2020-09-20 PROCEDURE — 99232 SBSQ HOSP IP/OBS MODERATE 35: CPT

## 2020-09-20 PROCEDURE — 72197 MRI PELVIS W/O & W/DYE: CPT | Mod: 26

## 2020-09-20 RX ORDER — LISINOPRIL 2.5 MG/1
10 TABLET ORAL DAILY
Refills: 0 | Status: DISCONTINUED | OUTPATIENT
Start: 2020-09-21 | End: 2020-09-21

## 2020-09-20 RX ORDER — LISINOPRIL/HYDROCHLOROTHIAZIDE 10-12.5 MG
1 TABLET ORAL
Qty: 0 | Refills: 0 | DISCHARGE

## 2020-09-20 RX ORDER — ATORVASTATIN CALCIUM 80 MG/1
1 TABLET, FILM COATED ORAL
Qty: 0 | Refills: 0 | DISCHARGE

## 2020-09-20 RX ORDER — LISINOPRIL 2.5 MG/1
5 TABLET ORAL ONCE
Refills: 0 | Status: COMPLETED | OUTPATIENT
Start: 2020-09-20 | End: 2020-09-20

## 2020-09-20 RX ADMIN — HEPARIN SODIUM 5000 UNIT(S): 5000 INJECTION INTRAVENOUS; SUBCUTANEOUS at 15:18

## 2020-09-20 RX ADMIN — Medication 100 MILLIGRAM(S): at 21:24

## 2020-09-20 RX ADMIN — Medication 10 UNIT(S): at 08:19

## 2020-09-20 RX ADMIN — Medication 10 UNIT(S): at 12:26

## 2020-09-20 RX ADMIN — Medication 1 APPLICATORFUL: at 21:26

## 2020-09-20 RX ADMIN — Medication 6: at 08:19

## 2020-09-20 RX ADMIN — LISINOPRIL 5 MILLIGRAM(S): 2.5 TABLET ORAL at 12:19

## 2020-09-20 RX ADMIN — Medication 4: at 12:26

## 2020-09-20 RX ADMIN — LISINOPRIL 5 MILLIGRAM(S): 2.5 TABLET ORAL at 05:10

## 2020-09-20 RX ADMIN — Medication 2: at 17:37

## 2020-09-20 RX ADMIN — Medication 100 MILLIGRAM(S): at 15:17

## 2020-09-20 RX ADMIN — INSULIN GLARGINE 25 UNIT(S): 100 INJECTION, SOLUTION SUBCUTANEOUS at 21:25

## 2020-09-20 RX ADMIN — Medication 81 MILLIGRAM(S): at 12:00

## 2020-09-20 RX ADMIN — HEPARIN SODIUM 5000 UNIT(S): 5000 INJECTION INTRAVENOUS; SUBCUTANEOUS at 05:10

## 2020-09-20 RX ADMIN — Medication 100 MILLIGRAM(S): at 05:10

## 2020-09-20 RX ADMIN — Medication 10 UNIT(S): at 17:37

## 2020-09-20 RX ADMIN — ATORVASTATIN CALCIUM 20 MILLIGRAM(S): 80 TABLET, FILM COATED ORAL at 21:24

## 2020-09-20 RX ADMIN — HEPARIN SODIUM 5000 UNIT(S): 5000 INJECTION INTRAVENOUS; SUBCUTANEOUS at 21:26

## 2020-09-20 NOTE — DISCHARGE NOTE PROVIDER - HOSPITAL COURSE
56 yo female with history of DM type 2, TIA, HLD, R hemicolectomy, primary anastomosis, ventral hernia repair with biologic mesh for strangulated ventral hernia who now presents with 'blood blister' on toe and sepsis with leukocytosis, tachycardia and low grade temperature. Patient reports she had 1 month ago removal of a wart on her right 1st toe with Dr. Mccollum podiatry, has not had issues with it since but yesterday noted that after stepping out of the shower she has blister on her toe. She called her podiatrist that night to make appointment and ended having nausea and vomiting. The following day she had enlargement of her toe and fever at home.  In the ED for N/V ED ordered CT abdomen that did not show obstruction but did show large ventral wall seroma, seen by surgery no sign of infection , +fever/chills. She received 1 L of LR and vancomycin and zosyn for supected toe infection , seen by podiatry s/p bedside debridement  , ID on borad MR of foot positive for acute OM , hyperglycemia insulin regimen adjusted for better control , post op toe amputation by podiatry . Pt had gyn oncology consulted for incidental R ovarian cyst seen on CT of abd , MR of pelvis tumor markers ordered   PICC line inserted      54 yo female with history of DM type 2, TIA, HLD, R hemicolectomy, primary anastomosis, ventral hernia repair with biologic mesh for strangulated ventral hernia who now presents with 'blood blister' on toe. Patient reports she had 1 month ago removal of a wart on her right 1st toe with Dr. Mccollum podiatry, has not had issues with it since but yesterday noted that after stepping out of the shower she has blister on her toe. She called her podiatrist that night to make appointment and ended having nausea and vomiting. The following day she had enlargement of her toe and fever at home.  In the ED for N/V ED ordered CT abdomen that did not show obstruction but did show large ventral wall seroma, seen by surgery no sign of infection.  Admitted for right foot OM s/p resection by podiatry and planned for IV antibiotics via picc line by ID.  noted to have incidental finding of ovarian cyst with MRI confirming 7.8cm right simple ovarian cyst.  Noted to have uncontrolled diabetes and started on insulin therapy. outpatient endocrine follow up on oct 12,2020 at 230 pm      dc planning 32 minutes. vitals stable afebrile no acute complaints. pain controlled. ambulating with surgical shoe and walker  ros negative otherwise   aaox3 nad rrr s1s2 ctab soft abdomen no LE edema right foot bandaged. nonfocal neuro.

## 2020-09-20 NOTE — DISCHARGE NOTE PROVIDER - NSDCHHNEEDSERVICE_GEN_ALL_CORE
Medication teaching and assessment/Observation and assessment Rehabilitation services/Observation and assessment/Medication teaching and assessment

## 2020-09-20 NOTE — DISCHARGE NOTE PROVIDER - NSDCMRMEDTOKEN_GEN_ALL_CORE_FT
3 in 1 commode:   atorvastatin 20 mg oral tablet: 1 tab(s) orally once a day  glipiZIDE 10 mg oral tablet: 1 tab(s) orally once a day  lisinopril-hydrochlorothiazide 10 mg-12.5 mg oral tablet: 1 tab(s) orally once a day  metFORMIN 500 mg oral tablet: 1 tab(s) orally 2 times a day  Rolling Walker (5 inch wheels): 1 rolling walker with 5 inch wheels. Diagnosis and ICD 10 strangulated ventral hernia K43.6  shower chair:    3 in 1 commode:   atorvastatin 20 mg oral tablet: 1 tab(s) orally once a day  Basaglar KwikPen 100 units/mL subcutaneous solution: 25 unit(s) subcutaneous once a day (at bedtime)   ceFAZolin 2 g intravenous injection: 2 gram(s) intravenous every 8 hours  ceFAZolin 2 g intravenous injection: 2 gram(s) intravenously every 8 hours MDD:thru 10/14/2020, weekly CBC CMP ESR CRP [email or web address]  [email or web address] (per patient&#x27;s insurance): Test blood sugars four times a day. Dispense #1 glucometer.  HumaLOG KwikPen 100 units/mL injectable solution: 8 unit(s) subcutaneous 3 times a day (with meals)   lancets: 1 application subcutaneously 4 times a day   lisinopril-hydrochlorothiazide 10 mg-12.5 mg oral tablet: 1 tab(s) orally once a day  metFORMIN 500 mg oral tablet: 1 tab(s) orally 2 times a day  Rolling Walker (5 inch wheels): 1 rolling walker with 5 inch wheels. Diagnosis and ICD 10 strangulated ventral hernia K43.6  shower chair:   test strips (per patient&#x27;s insurance): 1 application subcutaneously 4 times a day. ** Compatible with patient&#x27;s glucometer **   3 in 1 commode:   atorvastatin 20 mg oral tablet: 1 tab(s) orally once a day  Basaglar KwikPen 100 units/mL subcutaneous solution: 25 unit(s) subcutaneous once a day (at bedtime)   ceFAZolin 2 g intravenous injection: 2 gram(s) intravenously every 8 hours MDD:thru 10/14/2020, weekly CBC CMP ESR CRP [email or web address]  [email or web address] (per patient&#x27;s insurance): Test blood sugars four times a day. Dispense #1 glucometer.  HumaLOG KwikPen 100 units/mL injectable solution: 8 unit(s) subcutaneous 3 times a day (with meals)   lancets: 1 application subcutaneously 4 times a day   lisinopril-hydrochlorothiazide 10 mg-12.5 mg oral tablet: 1 tab(s) orally once a day  metFORMIN 500 mg oral tablet: 1 tab(s) orally 2 times a day  Rolling Walker (5 inch wheels): 1 rolling walker with 5 inch wheels. Diagnosis and ICD 10 strangulated ventral hernia K43.6  shower chair:   test strips (per patient&#x27;s insurance): 1 application subcutaneously 4 times a day. ** Compatible with patient&#x27;s glucometer **

## 2020-09-20 NOTE — PROGRESS NOTE ADULT - SUBJECTIVE AND OBJECTIVE BOX
Patient is a 55y old  Female who presents with a chief complaint of Sepsis 2/2 Infected R 1st digit DM Ulcer (14 Sep 2020 01:15)      HPI:  56 yo female with history of DM type 2, TIA, HLD, R hemicolectomy, primary anastomosis, ventral hernia repair with biologic mesh for strangulated ventral hernia who now presents with 'blood blister' on toe and sepsis with leukocytosis, tachycardia and low grade temperature. Patient reports she had 1 month ago removal of a wart on her right 1st toe with Dr. Mccollum podiatry, has not had issues with it since but yesterday noted that after stepping out of the shower she has blister on her toe. She called her podiatrist that night to make appointment and ended having nausea and vomiting. The following day she had enlargement of her toe and fever at home. She took tyelnol and came to the ED.    In the ED for N/V ED ordered CT abdomen that did not show obstruction but did show large ventral wall seroma. At this time she has no complaints of abdominal pain or further N/V. She is feeling better in the ED. She denies any other infectious symptomatology. Denies chest pain, SOB, cough. +fever/chills. She received 1 L of LR and vancomycin and zosyn.   (14 Sep 2020 01:15)    History as above. Pt seen bedside in NAD. States that she has a blood blister on her R big toe for several days and has gotten worse and more swollen. Adds she used to follow a podiatrist but has not seen for a few months. Came to ED for fever and nausea. Denies any pain to the foot.     Podiatry HPI: Patient seen bedside S/p R foot partial 1st ray amputation 9/17. Patient reports doing better with little pain. She was seen sitting at bedside with both feel on the floor. She states that she mostly sits in her chair with her foot in a dependent position. Pt denies any pain or other pedal complaints. Dressing clean, dry and intact. Denies f/c/n/v/sob. Pending PICC placement.       MEDICATIONS  (STANDING):  aspirin  chewable 81 milliGRAM(s) Oral daily  atorvastatin 20 milliGRAM(s) Oral at bedtime  ceFAZolin   IVPB 2000 milliGRAM(s) IV Intermittent every 8 hours  heparin   Injectable 5000 Unit(s) SubCutaneous every 8 hours  influenza   Vaccine 0.5 milliLiter(s) IntraMuscular once  insulin glargine Injectable (LANTUS) 25 Unit(s) SubCutaneous at bedtime  insulin lispro (HumaLOG) corrective regimen sliding scale   SubCutaneous three times a day before meals  insulin lispro Injectable (HumaLOG) 10 Unit(s) SubCutaneous three times a day before meals    MEDICATIONS  (PRN):  dextrose 40% Gel 15 Gram(s) Oral once PRN Blood Glucose LESS THAN 70 milliGRAM(s)/deciliter  dextrose 40% Gel 15 Gram(s) Oral once PRN Blood Glucose LESS THAN 70 milliGRAM(s)/deciliter  ondansetron Injectable 4 milliGRAM(s) IV Push every 6 hours PRN Nausea and/or Vomiting      FHNo pertinent family history in first degree relatives    ,   PMHHypercholesteremia    TIA (transient ischemic attack)    DM (diabetes mellitus)       PSHS/P right hemicolectomy    S/P repair of ventral hernia                            13.0   11.25 )-----------( 322      ( 20 Sep 2020 07:53 )             39.5       09-20    136  |  100  |  19.0  ----------------------------<  267<H>  4.3   |  22.0  |  0.59    Ca    9.2      20 Sep 2020 07:53  Phos  3.5     09-20  Mg     1.9     09-20    TPro  6.9  /  Alb  3.1<L>  /  TBili  0.3<L>  /  DBili  x   /  AST  50<H>  /  ALT  49<H>  /  AlkPhos  365<H>  09-20      Vital Signs Last 24 Hrs  T(C): 36.3 (20 Sep 2020 11:57), Max: 37.1 (19 Sep 2020 16:25)  T(F): 97.3 (20 Sep 2020 11:57), Max: 98.8 (19 Sep 2020 16:25)  HR: 82 (20 Sep 2020 11:57) (80 - 101)  BP: 142/80 (20 Sep 2020 11:57) (142/80 - 163/96)  BP(mean): --  RR: 18 (20 Sep 2020 11:57) (18 - 18)  SpO2: 99% (20 Sep 2020 11:57) (97% - 99%)    Sedimentation Rate, Erythrocyte: 48 mm/hr (09-15-20 @ 08:04)            PHYSICAL EXAM  GEN: FARHAT SLADE is a pleasant well-nourished, well developed 55y Female in no acute distress, alert awake, and oriented to person, place and time.   LE Focused:    Vasc: DP/PT palpable, CFT Brisk to digits x9,   Derm: Right foot surgical site with little strike through, sutures intact, skin well coapted proximally with dehiscence distally, mild maceration to edges, no drainage, mild sandee-incision erythema, no clinical sign of infection. Edema noted to forefoot.   Neuro: protective sensation diminished   MSK: no gross deformity noted.     Image:    EXAM:  TOE(S)-RIGHT FOOT                          PROCEDURE DATE:  09/13/2020          INTERPRETATION:  AP, lateral, and oblique views of the RIGHT first hallux are submitted.    Clinical data; trauma with pain.    There is diffuse soft tissue swelling of the first toe with subcutaneous and air collections on the plantar surface. Adjacent to distal and proximal first digit osseous structures show no gross of fracture or osteolytic lesion./  IMPRESSION: Diffuse soft tissue swelling with subcutaneous air. No fracture or gross osteolytic lesion.    If osteomyelitis is considered, despite conservative therapy, and soft tissue / bone infection requires further assessment, follow-up MRI recommended.        YIFAN PUENTES M.D., ATTENDING RADIOLOGIST  This document has been electronically signed. Sep 14 2020  1:48PM    < from: MR Foot No Cont, Right (09.16.20 @ 10:28) >     EXAM:  MR FOOT RT                          PROCEDURE DATE:  09/16/2020          INTERPRETATION:  Clinical indication: Toe swelling and discoloration with an open wound.    Multiplanar multisequence MRI of the right foot was performed from the level of the toes to the level of the talonavicular joint without intravenous contrast.    FINDINGS:    There is marked maceration and deficiency of the soft tissues along the medial plantar aspect of the first toe extending from the distal phalanx to theinterphalangeal joint. There appears to be exposure of the plantar aspect of the first distal phalanx. There is mild subcortical osseous edema involving the distal phalanx of the first toe with slightly decreased subcortical T1 marrow signal concerning for early osteomyelitis. There is diffuse cellulitis throughout the first toe which extends along the dorsal aspect of the foot. There is small fluid within the first toe flexor tendon sheath extending to the level of the midfoot consistent with mild tenosynovitis.    There is osseous edema centered about the second and third tarsometatarsal articulations consistent with osseous stress reaction. Osseous stress reaction about the second tarsometatarsal articulation extends from the base of the second metatarsal to the mid shaft. These findings are seen in the setting of moderate to severe arthrosis. There is no evidence of acute fracture.    There is mild hallux valgus with moderate first metatarsophalangeal and hallux sesamoid joint arthrosis. There is prominent subchondral cystic change along the plantar aspect of the first metatarsal head. There is a nonspecific moderate first metatarsal phalangeal joint effusion. Correlate clinically to exclude superimposed infection.    There is edema and fatty atrophy throughout the plantar musculature consistent with denervation related changes. No acute tendinous injury is demonstrated. The Lisfranc ligament is preserved.    IMPRESSION:    Extensive maceration and deficiency of the soft tissues about the medial plantar aspect of the first toe. Findings suggestive of early subcortical osteomyelitis along the plantar aspect of the distal phalanx.    Mild tenosynovitis within the first toe flexor tendon.    Moderate first metatarsal phalangeal joint arthrosis with prominent subchondral cystic change along the plantar aspect of the first metatarsal head. Nonspecific moderate first metatarsal phalangeal joint effusion. Correlate clinically to this exclude superimposed infection.    Prominent osseous stress reaction centered about the second and third tarsometatarsal articulations.          TANIKA HEIN M.D., ATTENDING RADIOLOGIST  This document has been electronically signed. Sep 16 2020 10:52AM        Culture - Surgical Swab (09.14.20 @ 22:12)    Specimen Source: .Surgical Swab right hallux    Culture Results:   Numerous Streptococcus anginosus  Few Corynebacterium species  "Susceptibilities not performed"         EXAM:  FOOT-RIGHT                          PROCEDURE DATE:  09/17/2020          INTERPRETATION:  Right foot. 3 views postop.    There is been amputation at the mid shaft of the first metatarsal new since September 14.    There is a small inferior calcaneal spur.    IMPRESSION: New amputation as above.      YIFAN LONG M.D., ATTENDING RADIOLOGIST  This document has been electronically signed. Sep 17 2020  4:37PM    < end of copied text >    OR Path and Culture pending    A:  R partial 1st ray amputation 9/17  R hallux gas gangrene s/p bedside I&D 9/14  R hallux OM  R hallux ulceration    P:  Patient evaluated, chart reviewed  MRI as above - OM   Culture taken - reviewed as above  OR path and culture pending  Applied betadine, DSD, ACE to Right foot   WB as tolerated to R heel in surgical shoe  Pending PICC line placement  Continue to elevate surgical limb to reduce swelling  Podiatry to follow while in house  Discussed with Dr. Yoo     Wound care instructions:  1. Apply xeroform, 4x4 gauze and place over wound  2. Secure with Kerlix wrap and ACE  3. Change every other day

## 2020-09-20 NOTE — DISCHARGE NOTE PROVIDER - CARE PROVIDER_API CALL
Fareed, Peter J  PODIATRIC MEDICINE AND SURGERY  [address] [place] [address]  Phone: [phone]  Fax: [phone]  Follow Up Time: 1 week   Fareed, Peter J  PODIATRIC MEDICINE AND SURGERY  [address] [place] [address]  Phone: [phone]  Fax: [phone]  Follow Up Time: 1 week    Armando Leal  INFECTIOUS DISEASE  [address] [facility], [address]  [place] [address]  Phone: [phone]  Fax: [phone]  Follow Up Time:     Tatiana Calvo  ENDOCRINOLOGY/METAB/DIABETES  [address] [place] [address], [place] [id number]  Phone: [phone]  Fax: [phone]  Follow Up Time:

## 2020-09-20 NOTE — DISCHARGE NOTE PROVIDER - PROVIDER TOKENS
PROVIDER:[TOKEN:[[id number]],FOLLOWUP:[1 week]] PROVIDER:[TOKEN:[[id number]],FOLLOWUP:[1 week]],PROVIDER:[TOKEN:[[id number]]],PROVIDER:[TOKEN:[[id number]]]

## 2020-09-20 NOTE — PROGRESS NOTE ADULT - ASSESSMENT
56 yo female with history of DM type 2, TIA, HLD, R hemicolectomy, primary anastomosis, ventral hernia repair with biologic mesh for strangulated ventral hernia who now presents with 'blood blister' on toe and sepsis with leukocytosis, tachycardia and low grade temperature. Patient reports she had 1 month ago removal of a wart on her right 1st toe with Dr. Mccollum podiatry, has not had issues with it since but yesterday noted that after stepping out of the shower she has blister on her toe. She called her podiatrist that night to make appointment and ended having nausea and vomiting. The following day she had enlargement of her toe and fever at home.  In the ED for N/V ED ordered CT abdomen that did not show obstruction but did show large ventral wall seroma, seen by surgery no sign of infection , +fever/chills. She received 1 L of LR and vancomycin and zosyn for supected toe infection , seen by podiatry s/p bedside debridment , ID on borad MR of foot positive for acute OM , hyperglycemia insulin regimen adjusted for better control , post op toe amputation by podiatry , gyn oncology consulted for incidental R ovarian cyst seen on CT of abd ,tumor markers and MR of pelvis ordered , pending PICC line  for iv abx       1-Acute OM of right great toe due to diabetes   s/p toe amputation , intra op cx so for no growth     wound cx from 9/14 positive strep and staff   cont iv abx Cefazolin per ID    wound care per podiatry  team   WBAT with shoe   PICC line insertion is pending for iv abx     2- Diabetes Mellitus type2 , poorly controlled with hyperglycemia  lantus and pre meals insulin dose adjusted   BG improving     3- Right ovarian cyst incidental findings   gyn oncology consulted   tumor markers and MR pending     4-  HTN   cont lisinopril  will increase to 10 mg home dose   monitor   add hctz if needed     5- Hyponatremia on admission   improved s/p fluid     6- Morbid obesity   will benefit from life style changes weight loss, exercise   counseling provided     discharge in 24-48 hours pending PICC and home iv abx infusion         discharge likely monday if PICC line and iv abx set up   she wants to go home

## 2020-09-20 NOTE — PROGRESS NOTE ADULT - SUBJECTIVE AND OBJECTIVE BOX
Internal Medicine Hospitalist Progress Note    Follow up for Right  toe OM wound , s/p toe amputation   seen in am , she is very anxious wants to go home , aware of pending PICC and MR tests   she is otherwise feeling well , no overnight events reported          Vital Signs Last 24 Hrs  T(C): 36.4 (20 Sep 2020 04:53), Max: 37.2 (19 Sep 2020 12:00)  T(F): 97.6 (20 Sep 2020 04:53), Max: 99 (19 Sep 2020 12:00)  HR: 91 (20 Sep 2020 04:53) (80 - 101)  BP: 163/96 (20 Sep 2020 04:53) (128/70 - 163/96)  BP(mean): --  RR: 18 (20 Sep 2020 04:53) (18 - 18)  SpO2: 98% (20 Sep 2020 04:53) (97% - 100%)    Constitutional: awake alert , no distress     Respiratory: CTA  bilateral     Cardiovascular: regular s1 /s2     Gastrointestinal: soft no tenderness , Bs positive     Extremities: no pretibial edema , right foot  dressing in place     SKin : warm normal color     CAPILLARY BLOOD GLUCOSE      POCT Blood Glucose.: 266 mg/dL (20 Sep 2020 08:03)  POCT Blood Glucose.: 164 mg/dL (19 Sep 2020 21:37)  POCT Blood Glucose.: 237 mg/dL (19 Sep 2020 17:24)  POCT Blood Glucose.: 191 mg/dL (19 Sep 2020 12:26)                          13.0   11.25 )-----------( 322      ( 20 Sep 2020 07:53 )             39.5   09-20    136  |  100  |  19.0  ----------------------------<  267<H>  4.3   |  22.0  |  0.59    Ca    9.2      20 Sep 2020 07:53  Phos  3.5     09-20  Mg     1.9     09-20    TPro  6.9  /  Alb  3.1<L>  /  TBili  0.3<L>  /  DBili  x   /  AST  50<H>  /  ALT  49<H>  /  AlkPhos  365<H>  09-20        Culture - Tissue with Gram Stain (09.18.20 @ 03:24)   Gram Stain:   No polymorphonuclear cells seen per low power field   No organisms seen per oil power field   Specimen Source: .Tissue 1st metatarsal clear margin   Culture Results:   No growth   `  Cculture - Tissue with Gram Stain (09.18.20 @ 03:24)   Gram Stain:   No polymorphonuclear cells seen per low power field   No organisms seen per oil power field   Specimen Source: .Tissue 1st metatarsal clear margin   Culture Results:   No growth Culture - Surgical Swab (09.14.20 @ 22:12)   - Ampicillin/Sulbactam: S <=8/4   - Cefazolin: S <=4   - Clindamycin: S <=0.5   - Erythromycin: S <=0.5   - Gentamicin: S <=4 Should not be used as monotherapy   - Oxacillin: S <=0.25   - RIF- Rifampin: S <=1 Should not be used as monotherapy   - Tetra/Doxy: S <=4   - Trimethoprim/Sulfamethoxazole: S <=0.5/9.5   - Vancomycin: S 1   Specimen Source: .Surgical Swab right hallux   Culture Results:   Numerous Streptococcus anginosus "Susceptibilities not performed"   Few Corynebacterium species "Susceptibilities not performed"   Few Staphylococcus aureus   Organism Identification: Staphylococcus aureus   Organism: Staphylococcus aureus   Method Type: KAYLEY

## 2020-09-20 NOTE — DISCHARGE NOTE PROVIDER - CARE PROVIDERS DIRECT ADDRESSES
,DirectAddress_Unknown ,DirectAddress_Unknown,[email or web address]

## 2020-09-20 NOTE — DISCHARGE NOTE PROVIDER - NSDCCPCAREPLAN_GEN_ALL_CORE_FT
PRINCIPAL DISCHARGE DIAGNOSIS  Diagnosis: Toe infection  Assessment and Plan of Treatment: Osteomyelitis s/p ampuation of toe   cont wound care and antibiotocs   follow up with podiatrist      SECONDARY DISCHARGE DIAGNOSES  Diagnosis: Uncontrolled diabetes mellitus with complications  Assessment and Plan of Treatment: check your blood glucose use insulin as instructed , weight loss diet control    Diagnosis: Ovarian mass, right  Assessment and Plan of Treatment: see gynecologist as instructed    Diagnosis: Hypertension  Assessment and Plan of Treatment: continue current medication     PRINCIPAL DISCHARGE DIAGNOSIS  Diagnosis: Toe infection  Assessment and Plan of Treatment: Osteomyelitis underwent amputation of toe   cont wound care and antibiotocs   follow up with podiatrist within 1 week  Wound care instructions:  1. Apply xeroform, 4x4 gauze and place over wound  2. Secure with Kerlix wrap and ACE  3. Change every other day      SECONDARY DISCHARGE DIAGNOSES  Diagnosis: Hypertension  Assessment and Plan of Treatment: continue current medications    Diagnosis: Uncontrolled diabetes mellitus with complications  Assessment and Plan of Treatment: check your blood glucose use insulin as instructed , weight loss diet control  follow up with endocrinology as scheduled oct 10 at 230 pm    Diagnosis: Ovarian cyst  Assessment and Plan of Treatment: follow up with your gynecologist (Ivory HINKLE)

## 2020-09-21 ENCOUNTER — TRANSCRIPTION ENCOUNTER (OUTPATIENT)
Age: 55
End: 2020-09-21

## 2020-09-21 VITALS
SYSTOLIC BLOOD PRESSURE: 151 MMHG | OXYGEN SATURATION: 98 % | HEART RATE: 84 BPM | TEMPERATURE: 98 F | DIASTOLIC BLOOD PRESSURE: 86 MMHG | RESPIRATION RATE: 18 BRPM

## 2020-09-21 LAB
GLUCOSE BLDC GLUCOMTR-MCNC: 168 MG/DL — HIGH (ref 70–99)
GLUCOSE BLDC GLUCOMTR-MCNC: 288 MG/DL — HIGH (ref 70–99)
HE4 OVARIAN CANCER: 99 PMOL/L — SIGNIFICANT CHANGE UP

## 2020-09-21 PROCEDURE — 83735 ASSAY OF MAGNESIUM: CPT

## 2020-09-21 PROCEDURE — 99285 EMERGENCY DEPT VISIT HI MDM: CPT | Mod: 25

## 2020-09-21 PROCEDURE — 87186 SC STD MICRODIL/AGAR DIL: CPT

## 2020-09-21 PROCEDURE — 86304 IMMUNOASSAY TUMOR CA 125: CPT

## 2020-09-21 PROCEDURE — 86901 BLOOD TYPING SEROLOGIC RH(D): CPT

## 2020-09-21 PROCEDURE — 88311 DECALCIFY TISSUE: CPT

## 2020-09-21 PROCEDURE — 90686 IIV4 VACC NO PRSV 0.5 ML IM: CPT

## 2020-09-21 PROCEDURE — 85025 COMPLETE CBC W/AUTO DIFF WBC: CPT

## 2020-09-21 PROCEDURE — 93005 ELECTROCARDIOGRAM TRACING: CPT

## 2020-09-21 PROCEDURE — 99239 HOSP IP/OBS DSCHRG MGMT >30: CPT

## 2020-09-21 PROCEDURE — 86850 RBC ANTIBODY SCREEN: CPT

## 2020-09-21 PROCEDURE — 82962 GLUCOSE BLOOD TEST: CPT

## 2020-09-21 PROCEDURE — 80053 COMPREHEN METABOLIC PANEL: CPT

## 2020-09-21 PROCEDURE — 99232 SBSQ HOSP IP/OBS MODERATE 35: CPT

## 2020-09-21 PROCEDURE — 76830 TRANSVAGINAL US NON-OB: CPT

## 2020-09-21 PROCEDURE — 72197 MRI PELVIS W/O & W/DYE: CPT

## 2020-09-21 PROCEDURE — 87075 CULTR BACTERIA EXCEPT BLOOD: CPT

## 2020-09-21 PROCEDURE — 88305 TISSUE EXAM BY PATHOLOGIST: CPT

## 2020-09-21 PROCEDURE — 85027 COMPLETE CBC AUTOMATED: CPT

## 2020-09-21 PROCEDURE — 96374 THER/PROPH/DIAG INJ IV PUSH: CPT | Mod: XU

## 2020-09-21 PROCEDURE — 71045 X-RAY EXAM CHEST 1 VIEW: CPT | Mod: 26

## 2020-09-21 PROCEDURE — 83935 ASSAY OF URINE OSMOLALITY: CPT

## 2020-09-21 PROCEDURE — 96375 TX/PRO/DX INJ NEW DRUG ADDON: CPT

## 2020-09-21 PROCEDURE — 83690 ASSAY OF LIPASE: CPT

## 2020-09-21 PROCEDURE — 84100 ASSAY OF PHOSPHORUS: CPT

## 2020-09-21 PROCEDURE — 86305 HUMAN EPIDIDYMIS PROTEIN 4: CPT

## 2020-09-21 PROCEDURE — 83605 ASSAY OF LACTIC ACID: CPT

## 2020-09-21 PROCEDURE — 86803 HEPATITIS C AB TEST: CPT

## 2020-09-21 PROCEDURE — 80202 ASSAY OF VANCOMYCIN: CPT

## 2020-09-21 PROCEDURE — 81001 URINALYSIS AUTO W/SCOPE: CPT

## 2020-09-21 PROCEDURE — 80048 BASIC METABOLIC PNL TOTAL CA: CPT

## 2020-09-21 PROCEDURE — 76856 US EXAM PELVIC COMPLETE: CPT

## 2020-09-21 PROCEDURE — 87086 URINE CULTURE/COLONY COUNT: CPT

## 2020-09-21 PROCEDURE — 85610 PROTHROMBIN TIME: CPT

## 2020-09-21 PROCEDURE — 86769 SARS-COV-2 COVID-19 ANTIBODY: CPT

## 2020-09-21 PROCEDURE — U0003: CPT

## 2020-09-21 PROCEDURE — 87040 BLOOD CULTURE FOR BACTERIA: CPT

## 2020-09-21 PROCEDURE — 84300 ASSAY OF URINE SODIUM: CPT

## 2020-09-21 PROCEDURE — 83036 HEMOGLOBIN GLYCOSYLATED A1C: CPT

## 2020-09-21 PROCEDURE — 85652 RBC SED RATE AUTOMATED: CPT

## 2020-09-21 PROCEDURE — 87070 CULTURE OTHR SPECIMN AEROBIC: CPT

## 2020-09-21 PROCEDURE — 74177 CT ABD & PELVIS W/CONTRAST: CPT

## 2020-09-21 PROCEDURE — 36415 COLL VENOUS BLD VENIPUNCTURE: CPT

## 2020-09-21 PROCEDURE — 71045 X-RAY EXAM CHEST 1 VIEW: CPT

## 2020-09-21 PROCEDURE — 73718 MRI LOWER EXTREMITY W/O DYE: CPT

## 2020-09-21 PROCEDURE — 85730 THROMBOPLASTIN TIME PARTIAL: CPT

## 2020-09-21 PROCEDURE — 86900 BLOOD TYPING SEROLOGIC ABO: CPT

## 2020-09-21 PROCEDURE — 73660 X-RAY EXAM OF TOE(S): CPT

## 2020-09-21 PROCEDURE — 36569 INSJ PICC 5 YR+ W/O IMAGING: CPT

## 2020-09-21 PROCEDURE — 73630 X-RAY EXAM OF FOOT: CPT

## 2020-09-21 RX ORDER — CEFAZOLIN SODIUM 1 G
2 VIAL (EA) INJECTION
Qty: 0 | Refills: 0 | DISCHARGE
Start: 2020-09-21

## 2020-09-21 RX ORDER — INSULIN LISPRO 100/ML
8 VIAL (ML) SUBCUTANEOUS
Qty: 3 | Refills: 0
Start: 2020-09-21 | End: 2020-10-20

## 2020-09-21 RX ORDER — CHLORHEXIDINE GLUCONATE 213 G/1000ML
1 SOLUTION TOPICAL
Refills: 0 | Status: DISCONTINUED | OUTPATIENT
Start: 2020-09-21 | End: 2020-09-21

## 2020-09-21 RX ORDER — SODIUM CHLORIDE 9 MG/ML
10 INJECTION INTRAMUSCULAR; INTRAVENOUS; SUBCUTANEOUS
Refills: 0 | Status: DISCONTINUED | OUTPATIENT
Start: 2020-09-21 | End: 2020-09-21

## 2020-09-21 RX ORDER — CEFAZOLIN SODIUM 1 G
2 VIAL (EA) INJECTION
Qty: 144 | Refills: 0
Start: 2020-09-21 | End: 2020-10-14

## 2020-09-21 RX ORDER — INSULIN GLARGINE 100 [IU]/ML
25 INJECTION, SOLUTION SUBCUTANEOUS
Qty: 5 | Refills: 0
Start: 2020-09-21 | End: 2020-10-20

## 2020-09-21 RX ADMIN — Medication 1 APPLICATORFUL: at 11:31

## 2020-09-21 RX ADMIN — Medication 10 UNIT(S): at 11:31

## 2020-09-21 RX ADMIN — LISINOPRIL 10 MILLIGRAM(S): 2.5 TABLET ORAL at 05:24

## 2020-09-21 RX ADMIN — Medication 6: at 08:22

## 2020-09-21 RX ADMIN — INFLUENZA VIRUS VACCINE 0.5 MILLILITER(S): 15; 15; 15; 15 SUSPENSION INTRAMUSCULAR at 16:06

## 2020-09-21 RX ADMIN — Medication 2: at 11:31

## 2020-09-21 RX ADMIN — Medication 100 MILLIGRAM(S): at 05:24

## 2020-09-21 RX ADMIN — Medication 81 MILLIGRAM(S): at 11:30

## 2020-09-21 RX ADMIN — CHLORHEXIDINE GLUCONATE 1 APPLICATION(S): 213 SOLUTION TOPICAL at 11:30

## 2020-09-21 RX ADMIN — Medication 10 UNIT(S): at 08:22

## 2020-09-21 RX ADMIN — HEPARIN SODIUM 5000 UNIT(S): 5000 INJECTION INTRAVENOUS; SUBCUTANEOUS at 05:24

## 2020-09-21 RX ADMIN — Medication 100 MILLIGRAM(S): at 13:16

## 2020-09-21 RX ADMIN — HEPARIN SODIUM 5000 UNIT(S): 5000 INJECTION INTRAVENOUS; SUBCUTANEOUS at 13:16

## 2020-09-21 NOTE — PROGRESS NOTE ADULT - PROVIDER SPECIALTY LIST ADULT
Anesthesia
Anesthesia
Hospitalist
Infectious Disease
Podiatry
Hospitalist
Infectious Disease

## 2020-09-21 NOTE — CONSULT NOTE ADULT - ASSESSMENT
FARHAT SLADE is a 54yo  HD#7; gyn consulted for R ovarian cyst incidentally found on CT, increased in size from previous CT 2019    HD#7 for sepsis 2/2 infected R 1st digital DM ulcer, now POD#4 from  R first ray toe amputation    -continues to be asymptomatic  -MRI pelvis showing 7.9 x 6.7cm simple cyst consistent with recent CT  -tumor markers  = 12, within normal limits  -pending HE4 for calculation JUAN Score (Ovarian malignancy Risk Algorithm); once resulted, will inform patient if inpatient, will call patient's cell phone if after discharge  -pt encouraged to re-establish outpatient care with GYN provider; given a few options and patient says she will Dr. Arellano office for further workup     Discussed with Dr Schroeder FARHAT SLADE is a 54yo  HD#7; gyn consulted for R ovarian cyst incidentally found on CT, increased in size from previous CT 2019    HD#7 for sepsis 2/2 infected R 1st digital DM ulcer, now POD#4 from  R first ray toe amputation    -continues to be asymptomatic  -MRI pelvis showing 7.9 x 6.7cm simple cyst   -tumor markers  = 12, within normal limits  -pending HE4 for calculation JUAN Score (Ovarian malignancy Risk Algorithm); once resulted, will inform patient if inpatient, will call patient's cell phone if after discharge  -pt encouraged to re-establish outpatient care with GYN provider; given a few options and patient says she will Dr. Arellano office for further workup     Discussed with Dr Schroeder      Addendum on 20:     HE4 = 99. JUAN score calculated to be 18.4%, low risk for post-menopausal women. Will call patient and inform her of this value. Continue to recommend routine outpatient follow up with ob/gyn.      Piedad Corral- PGY4, discussed with Dr. Schroeder

## 2020-09-21 NOTE — PROCEDURE NOTE - NSPROCDETAILS_GEN_ALL_CORE
ultrasound guidance/supine position/location identified, draped/prepped, sterile technique used/ultrasound assessment/sterile dressing applied/sterile technique, catheter placed

## 2020-09-21 NOTE — PROGRESS NOTE ADULT - SUBJECTIVE AND OBJECTIVE BOX
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       =======================================================  REVIEW OF SYSTEMS:  CONSTITUTIONAL:  No Fever or chills  HEENT:  No diplopia or blurred vision.  No earache, sore throat or runny nose.  CARDIOVASCULAR:  No pressure, squeezing, strangling, tightness, heaviness or aching about the chest, neck, axilla or epigastrium.  RESPIRATORY:  No cough, shortness of breath  GASTROINTESTINAL:  No nausea, vomiting or diarrhea.  GENITOURINARY:  No dysuria, frequency or urgency. No Blood in urine  MUSCULOSKELETAL:  no joint aches, no muscle pain  SKIN:  No change in skin, hair or nails.  NEUROLOGIC:  No Headaches, seizures or weakness.  PSYCHIATRIC:  No disorder of thought or mood.  ENDOCRINE:  No heat or cold intolerance  HEMATOLOGICAL:  No easy bruising or bleeding.   =======================================================    Allergies  Biaxin (Vomiting)    ======================================================  Physical Exam:  ============     General:  No acute distress.  Eye: Pupils are equal, round and reactive to light, Extraocular movements are intact, Normal conjunctiva.  HENT: Normocephalic, Oral mucosa is moist, No pharyngeal erythema, No sinus tenderness.  Neck: Supple, No lymphadenopathy.  Respiratory: Lungs are clear to auscultation, Respirations are non-labored.  Cardiovascular: Normal rate, Regular rhythm,   Gastrointestinal: Soft, Non-tender, Non-distended, Normal bowel sounds.  Genitourinary: No costovertebral angle tenderness.  Lymphatics: No lymphadenopathy neck,   Musculoskeletal: Normal range of motion, Normal strength.  Integumentary:  RIGHT HALLUX with  DRESSING IN PLACE  Neurologic: Alert, Oriented, No focal deficits, Cranial Nerves II-XII are grossly intact.  POOR SENSATION OF Pain on foot bilaterally.  Psychiatric: Appropriate mood & affect.    =======================================================     Vitals:  ============  T(F): 98.8 (21 Sep 2020 08:12), Max: 98.8 (21 Sep 2020 08:12)  HR: 90 (21 Sep 2020 08:12)  BP: 163/91 (21 Sep 2020 08:12)  RR: 20 (21 Sep 2020 08:12)  SpO2: 95% (21 Sep 2020 08:12) (95% - 98%)  temp max in last 48H T(F): , Max: 98.8 (09-19-20 @ 16:25)    =======================================================  Current Antibiotics:  ceFAZolin   IVPB 2000 milliGRAM(s) IV Intermittent every 8 hours    Other medications:  aspirin  chewable 81 milliGRAM(s) Oral daily  atorvastatin 20 milliGRAM(s) Oral at bedtime  chlorhexidine 2% Cloths 1 Application(s) Topical <User Schedule>  clotrimazole 2% Vaginal Cream 1 Applicatorful Vaginal daily  heparin   Injectable 5000 Unit(s) SubCutaneous every 8 hours  influenza   Vaccine 0.5 milliLiter(s) IntraMuscular once  insulin glargine Injectable (LANTUS) 25 Unit(s) SubCutaneous at bedtime  insulin lispro (HumaLOG) corrective regimen sliding scale   SubCutaneous three times a day before meals  insulin lispro Injectable (HumaLOG) 10 Unit(s) SubCutaneous three times a day before meals  lisinopril 10 milliGRAM(s) Oral daily      =======================================================  Labs:                        13.0   11.25 )-----------( 322      ( 20 Sep 2020 07:53 )             39.5      09-20    136  |  100  |  19.0  ----------------------------<  267<H>  4.3   |  22.0  |  0.59    Ca    9.2      20 Sep 2020 07:53  Phos  3.5     09-20  Mg     1.9     09-20    TPro  6.9  /  Alb  3.1<L>  /  TBili  0.3<L>  /  DBili  x   /  AST  50<H>  /  ALT  49<H>  /  AlkPhos  365<H>  09-20      Culture - Tissue with Gram Stain (collected 09-18-20 @ 03:24)  Source: .Tissue 1st metatarsal clear margin  Gram Stain (09-18-20 @ 07:40):    No polymorphonuclear cells seen per low power field    No organisms seen per oil power field    Culture - Surgical Swab (collected 09-14-20 @ 22:12)  Source: .Surgical Swab right hallux  Final Report (09-18-20 @ 20:40):    Numerous Streptococcus anginosus "Susceptibilities not performed"    Few Corynebacterium species "Susceptibilities not performed"    Few Staphylococcus aureus  Organism: Staphylococcus aureus (09-18-20 @ 20:40)  Organism: Staphylococcus aureus (09-18-20 @ 20:40)    Sensitivities:      -  Ampicillin/Sulbactam: S <=8/4      -  Cefazolin: S <=4      -  Clindamycin: S <=0.5      -  Erythromycin: S <=0.5      -  Gentamicin: S <=4 Should not be used as monotherapy      -  Oxacillin: S <=0.25      -  RIF- Rifampin: S <=1 Should not be used as monotherapy      -  Tetra/Doxy: S <=4      -  Trimethoprim/Sulfamethoxazole: S <=0.5/9.5      -  Vancomycin: S 1      Method Type: KAYLEY    Culture - Urine (collected 09-14-20 @ 08:14)  Source: .Urine Clean Catch (Midstream)  Final Report (09-15-20 @ 07:38):    No growth    Culture - Blood (collected 09-13-20 @ 21:18)  Source: .Blood Blood-Peripheral  Final Report (09-18-20 @ 23:00):    No growth at 5 days.    Culture - Blood (collected 09-13-20 @ 20:26)  Source: .Blood Blood-Peripheral  Final Report (09-18-20 @ 21:00):    No growth at 5 days.      Creatinine, Serum: 0.59 mg/dL (09-20-20 @ 07:53)  Creatinine, Serum: 0.60 mg/dL (09-17-20 @ 07:47)  Creatinine, Serum: 0.64 mg/dL (09-16-20 @ 17:22)    WBC Count: 11.25 K/uL (09-20-20 @ 07:53)  WBC Count: 10.68 K/uL (09-17-20 @ 07:47)  WBC Count: 9.06 K/uL (09-16-20 @ 17:22)      COVID-19 PCR: NotDetec (09-13-20 @ 21:35)      Alkaline Phosphatase, Serum: 365 U/L (09-20-20 @ 07:53)  Alanine Aminotransferase (ALT/SGPT): 49 U/L (09-20-20 @ 07:53)  Aspartate Aminotransferase (AST/SGOT): 50 U/L (09-20-20 @ 07:53)  Bilirubin Total, Serum: 0.3 mg/dL (09-20-20 @ 07:53)         < from: MR Foot No Cont, Right (09.16.20 @ 10:28) >     EXAM:  MR FOOT RT                          PROCEDURE DATE:  09/16/2020          INTERPRETATION:  Clinical indication: Toe swelling and discoloration with an open wound.    Multiplanar multisequence MRI of the right foot was performed from the level of the toes to the level of the talonavicular joint without intravenous contrast.    FINDINGS:    There is marked maceration and deficiency of the soft tissues along the medial plantar aspect of the first toe extending from the distal phalanx to theinterphalangeal joint. There appears to be exposure of the plantar aspect of the first distal phalanx. There is mild subcortical osseous edema involving the distal phalanx of the first toe with slightly decreased subcortical T1 marrow signal concerning for early osteomyelitis. There is diffuse cellulitis throughout the first toe which extends along the dorsal aspect of the foot. There is small fluid within the first toe flexor tendon sheath extending to the level of the midfoot consistent with mild tenosynovitis.    There is osseous edema centered about the second and third tarsometatarsal articulations consistent with osseous stress reaction. Osseous stress reaction about the second tarsometatarsal articulation extends from the base of the second metatarsal to the mid shaft. These findings are seen in the setting of moderate to severe arthrosis. There is no evidence of acute fracture.    There is mild hallux valgus with moderate first metatarsophalangeal and hallux sesamoid joint arthrosis. There is prominent subchondral cystic change along the plantar aspect of the first metatarsal head. There is a nonspecific moderate first metatarsal phalangeal joint effusion. Correlate clinically to exclude superimposed infection.    There is edema and fatty atrophy throughout the plantar musculature consistent with denervation related changes. No acute tendinous injury is demonstrated. The Lisfranc ligament is preserved.    IMPRESSION:    Extensive maceration and deficiency of the soft tissues about the medial plantar aspect of the first toe. Findings suggestive of early subcortical osteomyelitis along the plantar aspect of the distal phalanx.    Mild tenosynovitis within the first toe flexor tendon.    Moderate first metatarsal phalangeal joint arthrosis with prominent subchondral cystic change along the plantar aspect of the first metatarsal head. Nonspecific moderate first metatarsal phalangeal joint effusion. Correlate clinically to this exclude superimposed infection.    Prominent osseous stress reaction centered about the second and third tarsometatarsal articulations.         TANIKA HEIN M.D., ATTENDING RADIOLOGIST  This document has been electronically signed. Sep 16 2020 10:52AM    < end of copied text >

## 2020-09-21 NOTE — DISCHARGE NOTE NURSING/CASE MANAGEMENT/SOCIAL WORK - PATIENT PORTAL LINK FT
You can access the FollowMyHealth Patient Portal offered by [facility] by registering at the following website: [email or web address]. By joining [facility]’s FollowMyHealth portal, you will also be able to view your health information using other applications (apps) compatible with our system.

## 2020-09-21 NOTE — PROGRESS NOTE ADULT - REASON FOR ADMISSION
Sepsis 2/2 Infected R 1st digit DM Ulcer

## 2020-09-21 NOTE — CONSULT NOTE ADULT - CONSULT REASON
incidental ovarian cyst on CT
Toe infection
foot infection
incidental finding of seroma
incidental ovarian cyst on CT

## 2020-09-21 NOTE — PROCEDURE NOTE - NSPOSTPRCRAD_GEN_A_CORE
chest radiograph/ultrasound/depth of insertion/postion of catheter/line adjusted to depth of insertion/line in appropriate postion

## 2020-09-21 NOTE — PROGRESS NOTE ADULT - SUBJECTIVE AND OBJECTIVE BOX
Podiatry HPI: Patient seen bedside with attending S/p R foot partial 1st ray amputation 9/17. Patient reports doing better with little pain.  Pt denies any pain or other pedal complaints. Dressing clean, dry and intact. Denies f/c/n/v/sob. PICC placement this AM.     Patient is a 55y old  Female who presents with a chief complaint of Sepsis 2/2 Infected R 1st digit DM Ulcer (14 Sep 2020 01:15)  HPI:  56 yo female with history of DM type 2, TIA, HLD, R hemicolectomy, primary anastomosis, ventral hernia repair with biologic mesh for strangulated ventral hernia who now presents with 'blood blister' on toe and sepsis with leukocytosis, tachycardia and low grade temperature. Patient reports she had 1 month ago removal of a wart on her right 1st toe with Dr. Mccollum podiatry, has not had issues with it since but yesterday noted that after stepping out of the shower she has blister on her toe. She called her podiatrist that night to make appointment and ended having nausea and vomiting. The following day she had enlargement of her toe and fever at home. She took tyelnol and came to the ED.    In the ED for N/V ED ordered CT abdomen that did not show obstruction but did show large ventral wall seroma. At this time she has no complaints of abdominal pain or further N/V. She is feeling better in the ED. She denies any other infectious symptomatology. Denies chest pain, SOB, cough. +fever/chills. She received 1 L of LR and vancomycin and zosyn.   (14 Sep 2020 01:15)    History as above. Pt seen bedside in NAD. States that she has a blood blister on her R big toe for several days and has gotten worse and more swollen. Adds she used to follow a podiatrist but has not seen for a few months. Came to ED for fever and nausea. Denies any pain to the foot.       MEDICATIONS  (STANDING):  aspirin  chewable 81 milliGRAM(s) Oral daily  atorvastatin 20 milliGRAM(s) Oral at bedtime  ceFAZolin   IVPB 2000 milliGRAM(s) IV Intermittent every 8 hours  chlorhexidine 2% Cloths 1 Application(s) Topical <User Schedule>  clotrimazole 2% Vaginal Cream 1 Applicatorful Vaginal daily  heparin   Injectable 5000 Unit(s) SubCutaneous every 8 hours  influenza   Vaccine 0.5 milliLiter(s) IntraMuscular once  insulin glargine Injectable (LANTUS) 25 Unit(s) SubCutaneous at bedtime  insulin lispro (HumaLOG) corrective regimen sliding scale   SubCutaneous three times a day before meals  insulin lispro Injectable (HumaLOG) 10 Unit(s) SubCutaneous three times a day before meals  lisinopril 10 milliGRAM(s) Oral daily    MEDICATIONS  (PRN):  dextrose 40% Gel 15 Gram(s) Oral once PRN Blood Glucose LESS THAN 70 milliGRAM(s)/deciliter  dextrose 40% Gel 15 Gram(s) Oral once PRN Blood Glucose LESS THAN 70 milliGRAM(s)/deciliter  ondansetron Injectable 4 milliGRAM(s) IV Push every 6 hours PRN Nausea and/or Vomiting  sodium chloride 0.9% lock flush 10 milliLiter(s) IV Push every 1 hour PRN Pre/post blood products, medications, blood draw, and to maintain line patency        FHNo pertinent family history in first degree relatives    ,   PMHHypercholesteremia    TIA (transient ischemic attack)    DM (diabetes mellitus)       PSHS/P right hemicolectomy    S/P repair of ventral hernia    Labs                        13.0   11.25 )-----------( 322      ( 20 Sep 2020 07:53 )             39.5       09-20    136  |  100  |  19.0  ----------------------------<  267<H>  4.3   |  22.0  |  0.59    Ca    9.2      20 Sep 2020 07:53  Phos  3.5     09-20  Mg     1.9     09-20    TPro  6.9  /  Alb  3.1<L>  /  TBili  0.3<L>  /  DBili  x   /  AST  50<H>  /  ALT  49<H>  /  AlkPhos  365<H>  09-20      Vital Signs Last 24 Hrs  T(C): 36.3 (20 Sep 2020 11:57), Max: 37.1 (19 Sep 2020 16:25)  T(F): 97.3 (20 Sep 2020 11:57), Max: 98.8 (19 Sep 2020 16:25)  HR: 82 (20 Sep 2020 11:57) (80 - 101)  BP: 142/80 (20 Sep 2020 11:57) (142/80 - 163/96)  BP(mean): --  RR: 18 (20 Sep 2020 11:57) (18 - 18)  SpO2: 99% (20 Sep 2020 11:57) (97% - 99%)    Sedimentation Rate, Erythrocyte: 48 mm/hr (09-15-20 @ 08:04)          PHYSICAL EXAM  GEN: FARHAT SLADE is a pleasant well-nourished, well developed 55y Female in no acute distress, alert awake, and oriented to person, place and time.   LE Focused:    Vasc: DP/PT palpable, CFT Brisk to digits x9,   Derm: Right foot surgical site with little strike through, sutures intact, skin well coapted proximally with dehiscence distally, mild maceration to edges, no drainage, mild sandee-incision erythema, no clinical sign of infection. Edema noted to forefoot slightly improved.   Neuro: protective sensation diminished   MSK: no gross deformity noted.     Image:    EXAM:  TOE(S)-RIGHT FOOT                          PROCEDURE DATE:  09/13/2020          INTERPRETATION:  AP, lateral, and oblique views of the RIGHT first hallux are submitted.    Clinical data; trauma with pain.    There is diffuse soft tissue swelling of the first toe with subcutaneous and air collections on the plantar surface. Adjacent to distal and proximal first digit osseous structures show no gross of fracture or osteolytic lesion./  IMPRESSION: Diffuse soft tissue swelling with subcutaneous air. No fracture or gross osteolytic lesion.    If osteomyelitis is considered, despite conservative therapy, and soft tissue / bone infection requires further assessment, follow-up MRI recommended.      YIFAN PUENTES M.D., ATTENDING RADIOLOGIST  This document has been electronically signed. Sep 14 2020  1:48PM    ----------------------------------------------------------------------------------------------------------     EXAM:  MR FOOT RT                          PROCEDURE DATE:  09/16/2020        INTERPRETATION:  Clinical indication: Toe swelling and discoloration with an open wound.    Multiplanar multisequence MRI of the right foot was performed from the level of the toes to the level of the talonavicular joint without intravenous contrast.    FINDINGS:    There is marked maceration and deficiency of the soft tissues along the medial plantar aspect of the first toe extending from the distal phalanx to theinterphalangeal joint. There appears to be exposure of the plantar aspect of the first distal phalanx. There is mild subcortical osseous edema involving the distal phalanx of the first toe with slightly decreased subcortical T1 marrow signal concerning for early osteomyelitis. There is diffuse cellulitis throughout the first toe which extends along the dorsal aspect of the foot. There is small fluid within the first toe flexor tendon sheath extending to the level of the midfoot consistent with mild tenosynovitis.    There is osseous edema centered about the second and third tarsometatarsal articulations consistent with osseous stress reaction. Osseous stress reaction about the second tarsometatarsal articulation extends from the base of the second metatarsal to the mid shaft. These findings are seen in the setting of moderate to severe arthrosis. There is no evidence of acute fracture.    There is mild hallux valgus with moderate first metatarsophalangeal and hallux sesamoid joint arthrosis. There is prominent subchondral cystic change along the plantar aspect of the first metatarsal head. There is a nonspecific moderate first metatarsal phalangeal joint effusion. Correlate clinically to exclude superimposed infection.    There is edema and fatty atrophy throughout the plantar musculature consistent with denervation related changes. No acute tendinous injury is demonstrated. The Lisfranc ligament is preserved.    IMPRESSION:    Extensive maceration and deficiency of the soft tissues about the medial plantar aspect of the first toe. Findings suggestive of early subcortical osteomyelitis along the plantar aspect of the distal phalanx.    Mild tenosynovitis within the first toe flexor tendon.    Moderate first metatarsal phalangeal joint arthrosis with prominent subchondral cystic change along the plantar aspect of the first metatarsal head. Nonspecific moderate first metatarsal phalangeal joint effusion. Correlate clinically to this exclude superimposed infection.    Prominent osseous stress reaction centered about the second and third tarsometatarsal articulations.      TANIKA HEIN M.D., ATTENDING RADIOLOGIST  This document has been electronically signed. Sep 16 2020 10:52AM        Culture - Surgical Swab (09.14.20 @ 22:12)    Specimen Source: .Surgical Swab right hallux    Culture Results:   Numerous Streptococcus anginosus  Few Corynebacterium species  "Susceptibilities not performed"         EXAM:  FOOT-RIGHT                          PROCEDURE DATE:  09/17/2020          INTERPRETATION:  Right foot. 3 views postop.    There is been amputation at the mid shaft of the first metatarsal new since September 14.    There is a small inferior calcaneal spur.    IMPRESSION: New amputation as above.      YIFAN LONG M.D., ATTENDING RADIOLOGIST  This document has been electronically signed. Sep 17 2020  4:37PM      OR Path and Culture   Culture - Tissue with Gram Stain (09.18.20 @ 03:24)   Gram Stain:   No polymorphonuclear cells seen per low power field   No organisms seen per oil power field   Specimen Source: .Tissue 1st metatarsal clear margin   Culture Results:   No growth     A:  R partial 1st ray amputation 9/17  R hallux gas gangrene s/p bedside I&D 9/14  R hallux OM  R hallux ulceration    P:  Patient evaluated, chart reviewed  MRI as above - OM   Culture taken - reviewed as above  OR clean margin - no growth  Applied betadine, DSD, ACE to Right foot   WB as tolerated to R heel in surgical shoe  PICC line placement  Continue to elevate surgical limb to reduce swelling  Patient is stable for discharge from podiatry standpoint  Patient will follow up with Dr. Yoo outpatient Next week for continued care.   Discussed and seen with Dr. Yoo     Wound care instructions:  1. Apply xeroform, 4x4 gauze and place over wound  2. Secure with Kerlix wrap and ACE  3. Change every other day Podiatry HPI: Patient seen bedside with attending S/p R foot partial 1st ray amputation 9/17. Patient reports doing better with little pain.  Pt denies any pain or other pedal complaints. Dressing clean, dry and intact. Denies f/c/n/v/sob. PICC placement this AM.     Patient is a 55y old  Female who presents with a chief complaint of Sepsis 2/2 Infected R 1st digit DM Ulcer (14 Sep 2020 01:15)  HPI:  56 yo female with history of DM type 2, TIA, HLD, R hemicolectomy, primary anastomosis, ventral hernia repair with biologic mesh for strangulated ventral hernia who now presents with 'blood blister' on toe and sepsis with leukocytosis, tachycardia and low grade temperature. Patient reports she had 1 month ago removal of a wart on her right 1st toe with Dr. Mccollum podiatry, has not had issues with it since but yesterday noted that after stepping out of the shower she has blister on her toe. She called her podiatrist that night to make appointment and ended having nausea and vomiting. The following day she had enlargement of her toe and fever at home. She took tyelnol and came to the ED.    In the ED for N/V ED ordered CT abdomen that did not show obstruction but did show large ventral wall seroma. At this time she has no complaints of abdominal pain or further N/V. She is feeling better in the ED. She denies any other infectious symptomatology. Denies chest pain, SOB, cough. +fever/chills. She received 1 L of LR and vancomycin and zosyn.   (14 Sep 2020 01:15)    History as above. Pt seen bedside in NAD. States that she has a blood blister on her R big toe for several days and has gotten worse and more swollen. Adds she used to follow a podiatrist but has not seen for a few months. Came to ED for fever and nausea. Denies any pain to the foot.       MEDICATIONS  (STANDING):  aspirin  chewable 81 milliGRAM(s) Oral daily  atorvastatin 20 milliGRAM(s) Oral at bedtime  ceFAZolin   IVPB 2000 milliGRAM(s) IV Intermittent every 8 hours  chlorhexidine 2% Cloths 1 Application(s) Topical <User Schedule>  clotrimazole 2% Vaginal Cream 1 Applicatorful Vaginal daily  heparin   Injectable 5000 Unit(s) SubCutaneous every 8 hours  influenza   Vaccine 0.5 milliLiter(s) IntraMuscular once  insulin glargine Injectable (LANTUS) 25 Unit(s) SubCutaneous at bedtime  insulin lispro (HumaLOG) corrective regimen sliding scale   SubCutaneous three times a day before meals  insulin lispro Injectable (HumaLOG) 10 Unit(s) SubCutaneous three times a day before meals  lisinopril 10 milliGRAM(s) Oral daily    MEDICATIONS  (PRN):  dextrose 40% Gel 15 Gram(s) Oral once PRN Blood Glucose LESS THAN 70 milliGRAM(s)/deciliter  dextrose 40% Gel 15 Gram(s) Oral once PRN Blood Glucose LESS THAN 70 milliGRAM(s)/deciliter  ondansetron Injectable 4 milliGRAM(s) IV Push every 6 hours PRN Nausea and/or Vomiting  sodium chloride 0.9% lock flush 10 milliLiter(s) IV Push every 1 hour PRN Pre/post blood products, medications, blood draw, and to maintain line patency        FHNo pertinent family history in first degree relatives    ,   PMHHypercholesteremia    TIA (transient ischemic attack)    DM (diabetes mellitus)       PSHS/P right hemicolectomy    S/P repair of ventral hernia    Labs                        13.0   11.25 )-----------( 322      ( 20 Sep 2020 07:53 )             39.5       09-20    136  |  100  |  19.0  ----------------------------<  267<H>  4.3   |  22.0  |  0.59    Ca    9.2      20 Sep 2020 07:53  Phos  3.5     09-20  Mg     1.9     09-20    TPro  6.9  /  Alb  3.1<L>  /  TBili  0.3<L>  /  DBili  x   /  AST  50<H>  /  ALT  49<H>  /  AlkPhos  365<H>  09-20      Vital Signs Last 24 Hrs  T(C): 36.3 (20 Sep 2020 11:57), Max: 37.1 (19 Sep 2020 16:25)  T(F): 97.3 (20 Sep 2020 11:57), Max: 98.8 (19 Sep 2020 16:25)  HR: 82 (20 Sep 2020 11:57) (80 - 101)  BP: 142/80 (20 Sep 2020 11:57) (142/80 - 163/96)  BP(mean): --  RR: 18 (20 Sep 2020 11:57) (18 - 18)  SpO2: 99% (20 Sep 2020 11:57) (97% - 99%)    Sedimentation Rate, Erythrocyte: 48 mm/hr (09-15-20 @ 08:04)          PHYSICAL EXAM  GEN: FARHAT SLADE is a pleasant well-nourished, well developed 55y Female in no acute distress, alert awake, and oriented to person, place and time.   LE Focused:    Vasc: DP/PT palpable, CFT Brisk to digits x9,   Derm: Right foot surgical site with little strike through, sutures intact, skin well coapted proximally with dehiscence distally, mild maceration to edges, no drainage, mild sandee-incision erythema, no clinical sign of infection. Edema noted to forefoot slightly improved.   Neuro: protective sensation diminished   MSK: no gross deformity noted.     Image:    EXAM:  TOE(S)-RIGHT FOOT                          PROCEDURE DATE:  09/13/2020          INTERPRETATION:  AP, lateral, and oblique views of the RIGHT first hallux are submitted.    Clinical data; trauma with pain.    There is diffuse soft tissue swelling of the first toe with subcutaneous and air collections on the plantar surface. Adjacent to distal and proximal first digit osseous structures show no gross of fracture or osteolytic lesion./  IMPRESSION: Diffuse soft tissue swelling with subcutaneous air. No fracture or gross osteolytic lesion.    If osteomyelitis is considered, despite conservative therapy, and soft tissue / bone infection requires further assessment, follow-up MRI recommended.      YIFAN PUENTES M.D., ATTENDING RADIOLOGIST  This document has been electronically signed. Sep 14 2020  1:48PM    ----------------------------------------------------------------------------------------------------------     EXAM:  MR FOOT RT                          PROCEDURE DATE:  09/16/2020        INTERPRETATION:  Clinical indication: Toe swelling and discoloration with an open wound.    Multiplanar multisequence MRI of the right foot was performed from the level of the toes to the level of the talonavicular joint without intravenous contrast.    FINDINGS:    There is marked maceration and deficiency of the soft tissues along the medial plantar aspect of the first toe extending from the distal phalanx to theinterphalangeal joint. There appears to be exposure of the plantar aspect of the first distal phalanx. There is mild subcortical osseous edema involving the distal phalanx of the first toe with slightly decreased subcortical T1 marrow signal concerning for early osteomyelitis. There is diffuse cellulitis throughout the first toe which extends along the dorsal aspect of the foot. There is small fluid within the first toe flexor tendon sheath extending to the level of the midfoot consistent with mild tenosynovitis.    There is osseous edema centered about the second and third tarsometatarsal articulations consistent with osseous stress reaction. Osseous stress reaction about the second tarsometatarsal articulation extends from the base of the second metatarsal to the mid shaft. These findings are seen in the setting of moderate to severe arthrosis. There is no evidence of acute fracture.    There is mild hallux valgus with moderate first metatarsophalangeal and hallux sesamoid joint arthrosis. There is prominent subchondral cystic change along the plantar aspect of the first metatarsal head. There is a nonspecific moderate first metatarsal phalangeal joint effusion. Correlate clinically to exclude superimposed infection.    There is edema and fatty atrophy throughout the plantar musculature consistent with denervation related changes. No acute tendinous injury is demonstrated. The Lisfranc ligament is preserved.    IMPRESSION:    Extensive maceration and deficiency of the soft tissues about the medial plantar aspect of the first toe. Findings suggestive of early subcortical osteomyelitis along the plantar aspect of the distal phalanx.    Mild tenosynovitis within the first toe flexor tendon.    Moderate first metatarsal phalangeal joint arthrosis with prominent subchondral cystic change along the plantar aspect of the first metatarsal head. Nonspecific moderate first metatarsal phalangeal joint effusion. Correlate clinically to this exclude superimposed infection.    Prominent osseous stress reaction centered about the second and third tarsometatarsal articulations.      TANIKA HEIN M.D., ATTENDING RADIOLOGIST  This document has been electronically signed. Sep 16 2020 10:52AM        Culture - Surgical Swab (09.14.20 @ 22:12)    Specimen Source: .Surgical Swab right hallux    Culture Results:   Numerous Streptococcus anginosus  Few Corynebacterium species  "Susceptibilities not performed"         EXAM:  FOOT-RIGHT                          PROCEDURE DATE:  09/17/2020          INTERPRETATION:  Right foot. 3 views postop.    There is been amputation at the mid shaft of the first metatarsal new since September 14.    There is a small inferior calcaneal spur.    IMPRESSION: New amputation as above.      YIFAN LONG M.D., ATTENDING RADIOLOGIST  This document has been electronically signed. Sep 17 2020  4:37PM      OR Path and Culture   Culture - Tissue with Gram Stain (09.18.20 @ 03:24)   Gram Stain:   No polymorphonuclear cells seen per low power field   No organisms seen per oil power field   Specimen Source: .Tissue 1st metatarsal clear margin   Culture Results:   No growth     A:  R partial 1st ray amputation 9/17  R hallux gas gangrene s/p bedside I&D 9/14  R hallux OM  R hallux ulceration    P:  Patient evaluated, chart reviewed  MRI as above - OM   Culture taken - reviewed as above  OR clean margin - no growth  Applied betadine, DSD, ACE to Right foot   WB as tolerated to R heel in surgical shoe  PICC line placement  Continue to elevate surgical limb to reduce swelling  Patient is stable for discharge from podiatry standpoint  Rx. of forefoot offloading shoe dispensed to patient  Patient will follow up with Dr. Yoo outpatient Next week for continued care.   Discussed and seen with Dr. Yoo     Wound care instructions:  1. Apply xeroform, 4x4 gauze and place over wound  2. Secure with Kerlix wrap and ACE  3. Change every other day

## 2020-09-21 NOTE — PROGRESS NOTE ADULT - ASSESSMENT
This 54 yo female with history of DM type 2, TIA, HLD, R hemicolectomy, primary anastomosis, ventral hernia repair with biologic mesh for strangulated ventral hernia who now presents with 'blood blister' on toe and sepsis with leukocytosis, tachycardia and low grade temperature. Patient reports she had 1 month ago removal of a wart on her right 1st toe with Dr. Mccollum podiatry, has not had issues with it since but yesterday noted that after stepping out of the shower she has blister on her toe. She called her podiatrist that night to make appointment and ended having nausea and vomiting. The following day she had enlargement of her toe and fever at home. She took tyelnol and came to the ED.    In the ED for N/V ED ordered CT abdomen that did not show obstruction but did show large ventral wall seroma. At this time she has no complaints of abdominal pain or further N/V. She is feeling better in the ED. She denies any other infectious symptomatology. Denies chest pain, SOB, cough. +fever/chills. She received 1 L of LR and vancomycin and zosyn.   (14 Sep 2020 01:15)    patient reports that she did not feel any pain until Sunday, and then the toe "Blew up"  patient had been seen by podiatry, s/p debridement of the RIGHT hallux  foul smelling per podiatry team.    Patient see's PA at office of Dr. Presley.    Impression:  - diabetes  - neuropathy  - right foot infection  Right foot osteomyelitis  - WBC elevation    Plan:    - OM confirm in right hallux distal phalanx    WOUND cx with Strep sanguinis and  Staph aureus as well  surgical culture remain negative    continue Antibiotics:  ceFAZolin   IVPB 2000 milliGRAM(s) IV Intermittent every 8 hours  Bone cx from 9/17 remains negative  - will plan for 4 weeks CEFAZOLIN thru 10/14/2020      - A1c is elevated : on this patient  A1C with Estimated Average Glucose Result: 10.1 % (09-15-20 @ 08:04)  - will need good DM control      - WBC elevation from infection  - now resolved    - ESR and CRP ordered   Sedimentation Rate, Erythrocyte: 48 mm/hr (09-15-20 @ 08:04)       no contraindications to discharge to community from ID standpoint  patient to follow in my office in 2 weeks.   OK to discharge patient after PICC or mid-line placed and antibiotics arranged.

## 2020-09-21 NOTE — CONSULT NOTE ADULT - REASON FOR ADMISSION
Sepsis 2/2 Infected R 1st digit DM Ulcer

## 2020-09-23 LAB
CULTURE RESULTS: SIGNIFICANT CHANGE UP
SPECIMEN SOURCE: SIGNIFICANT CHANGE UP
SURGICAL PATHOLOGY STUDY: SIGNIFICANT CHANGE UP

## 2020-10-01 ENCOUNTER — APPOINTMENT (OUTPATIENT)
Dept: INTERNAL MEDICINE | Facility: CLINIC | Age: 55
End: 2020-10-01
Payer: COMMERCIAL

## 2020-10-01 VITALS
HEART RATE: 85 BPM | DIASTOLIC BLOOD PRESSURE: 85 MMHG | WEIGHT: 225 LBS | RESPIRATION RATE: 16 BRPM | SYSTOLIC BLOOD PRESSURE: 171 MMHG | BODY MASS INDEX: 35.31 KG/M2 | TEMPERATURE: 97.6 F | HEIGHT: 67 IN

## 2020-10-01 PROCEDURE — 99214 OFFICE O/P EST MOD 30 MIN: CPT

## 2020-10-01 NOTE — DATA REVIEWED
[FreeTextEntry1] : \par Labs:\par \par  13.0\par 11.25 )-----------( 322 ( 20 Sep 2020 07:53 )\par  39.5\par \par  09-20\par \par 136 | 100 | 19.0\par ----------------------------< 267<H>\par 4.3 | 22.0 | 0.59\par \par Ca 9.2 20 Sep 2020 07:53\par Phos 3.5 09-20\par Mg 1.9 09-20\par \par TPro 6.9 / Alb 3.1<L> / TBili 0.3<L> / DBili x / AST 50<H> /\par ALT 49<H> / AlkPhos 365<H> 09-20\par \par \par Culture - Tissue with Gram Stain (collected 09-18-20 @ 03:24)\par Source: .Tissue 1st metatarsal clear margin\par Gram Stain (09-18-20 @ 07:40):\par  No polymorphonuclear cells seen per low power field\par  No organisms seen per oil power field\par \par Culture - Surgical Swab (collected 09-14-20 @ 22:12)\par Source: .Surgical Swab right hallux\par Final Report (09-18-20 @ 20:40):\par  Numerous Streptococcus anginosus "Susceptibilities not performed"\par  Few Corynebacterium species "Susceptibilities not performed"\par  Few Staphylococcus aureus\par Organism: Staphylococcus aureus (09-18-20 @ 20:40)\par Organism: Staphylococcus aureus (09-18-20 @ 20:40)\par  Sensitivities:\par  - Ampicillin/Sulbactam: S <=8/4\par  - Cefazolin: S <=4\par  - Clindamycin: S <=0.5\par  - Erythromycin: S <=0.5\par  - Gentamicin: S <=4 Should not be used as monotherapy\par  - Oxacillin: S <=0.25\par  - RIF- Rifampin: S <=1 Should not be used as monotherapy\par  - Tetra/Doxy: S <=4\par  - Trimethoprim/Sulfamethoxazole: S <=0.5/9.5\par  - Vancomycin: S 1\par  Method Type: KAYLEY\par \par Culture - Urine (collected 09-14-20 @ 08:14)\par Source: .Urine Clean Catch (Midstream)\par Final Report (09-15-20 @ 07:38):\par  No growth\par \par Culture - Blood (collected 09-13-20 @ 21:18)\par Source: .Blood Blood-Peripheral\par Final Report (09-18-20 @ 23:00):\par  No growth at 5 days.\par \par Culture - Blood (collected 09-13-20 @ 20:26)\par Source: .Blood Blood-Peripheral\par Final Report (09-18-20 @ 21:00):\par  No growth at 5 days.\par \par \par Creatinine, Serum: 0.59 mg/dL (09-20-20 @ 07:53)\par Creatinine, Serum: 0.60 mg/dL (09-17-20 @ 07:47)\par Creatinine, Serum: 0.64 mg/dL (09-16-20 @ 17:22)\par \par WBC Count: 11.25 K/uL (09-20-20 @ 07:53)\par WBC Count: 10.68 K/uL (09-17-20 @ 07:47)\par WBC Count: 9.06 K/uL (09-16-20 @ 17:22)\par \par \par COVID-19 PCR: NotDetec (09-13-20 @ 21:35)\par \par \par Alkaline Phosphatase, Serum: 365 U/L (09-20-20 @ 07:53)\par Alanine Aminotransferase (ALT/SGPT): 49 U/L (09-20-20 @ 07:53)\par Aspartate Aminotransferase (AST/SGOT): 50 U/L (09-20-20 @ 07:53)\par Bilirubin Total, Serum: 0.3 mg/dL (09-20-20 @ 07:53)\par \par \par \par  < from: MR Foot No Cont, Right (09.16.20 @ 10:28) >\par \par  EXAM: MR FOOT RT\par \par PROCEDURE DATE: 09/16/2020\par \par \par \par INTERPRETATION: Clinical indication: Toe swelling and discoloration with an\par open wound.\par \par Multiplanar multisequence MRI of the right foot was performed from the level of\par the toes to the level of the talonavicular joint without intravenous contrast.\par \par FINDINGS:\par \par There is marked maceration and deficiency of the soft tissues along the medial\par plantar aspect of the first toe extending from the distal phalanx to\par theinterphalangeal joint. There appears to be exposure of the plantar aspect of\par the first distal phalanx. There is mild subcortical osseous edema involving the\par distal phalanx of the first toe with slightly decreased subcortical T1 marrow\par signal concerning for early osteomyelitis. There is diffuse cellulitis\par throughout the first toe which extends along the dorsal aspect of the foot.\par There is small fluid within the first toe flexor tendon sheath extending to the\par level of the midfoot consistent with mild tenosynovitis.\par \par There is osseous edema centered about the second and third tarsometatarsal\par articulations consistent with osseous stress reaction. Osseous stress reaction\par about the second tarsometatarsal articulation extends from the base of the\par second metatarsal to the mid shaft. These findings are seen in the setting of\par moderate to severe arthrosis. There is no evidence of acute fracture.\par \par There is mild hallux valgus with moderate first metatarsophalangeal and hallux\par sesamoid joint arthrosis. There is prominent subchondral cystic change along\par the plantar aspect of the first metatarsal head. There is a nonspecific\par moderate first metatarsal phalangeal joint effusion. Correlate clinically to\par exclude superimposed infection.\par \par There is edema and fatty atrophy throughout the plantar musculature consistent\par with denervation related changes. No acute tendinous injury is demonstrated.\par The Lisfranc ligament is preserved.\par \par IMPRESSION:\par \par Extensive maceration and deficiency of the soft tissues about the medial\par plantar aspect of the first toe. Findings suggestive of early subcortical\par osteomyelitis along the plantar aspect of the distal phalanx.\par \par Mild tenosynovitis within the first toe flexor tendon.\par \par Moderate first metatarsal phalangeal joint arthrosis with prominent subchondral\par cystic change along the plantar aspect of the first metatarsal head.\par Nonspecific moderate first metatarsal phalangeal joint effusion. Correlate\par clinically to this exclude superimposed infection.\par \par Prominent osseous stress reaction centered about the second and third\par tarsometatarsal articulations.\par \par \par \par TANIKA HEIN M.D., ATTENDING RADIOLOGIST\par This document has been electronically signed. Sep 16 2020 10:52AM\par \par < end of copied text >

## 2020-10-01 NOTE — HISTORY OF PRESENT ILLNESS
[FreeTextEntry1] : This 56 yo female with history of DM type 2, TIA, HLD, R hemicolectomy, primary\par anastomosis, ventral hernia repair with biologic mesh for strangulated ventral\par hernia\par last seen at [facility] on 9/21/2020 for:\par \par - diabetes\par - neuropathy\par - right foot infection\par Right foot osteomyelitis\par - WBC elevation\par \par - A1c is elevated : on this patient\par A1C with Estimated Average Glucose Result: 10.1 % (09-15-20 @ 08:04)\par \par \par - OM confirm in right hallux distal phalanx\par s/p Partial amputation of first ray of right foot by open approach on 9/17\par WOUND cx with Strep sanguinis and Staph aureus as well\par surgical culture remain negative\par \par She was planned for:\par ceFAZolin IVPB 2000 milliGRAM(s) IV Intermittent every 8 hours\par Bone cx from 9/17 remains negative\par - will plan for 4 weeks CEFAZOLIN thru 10/14/2020\par \par - will need good DM control\par \par \par - ESR and CRP ordered\par Sedimentation Rate, Erythrocyte: 48 mm/hr (09-15-20 @ 08:04)\par \par \par \par === \par she is here for follow up wth her . \par no drainage for toe\par had follow up with Dr. Yoo\par some dehiscence of surgical incision anteriorly. \par \par PICC Line right arm., occ bothers her. \par

## 2020-10-01 NOTE — PHYSICAL EXAM
[General Appearance - Alert] : alert [General Appearance - In No Acute Distress] : in no acute distress [Sclera] : the sclera and conjunctiva were normal [PERRL With Normal Accommodation] : pupils were equal in size, round, reactive to light [Extraocular Movements] : extraocular movements were intact [Outer Ear] : the ears and nose were normal in appearance [Oropharynx] : the oropharynx was normal with no thrush [Neck Appearance] : the appearance of the neck was normal [Neck Cervical Mass (___cm)] : no neck mass was observed [Jugular Venous Distention Increased] : there was no jugular-venous distention [Thyroid Diffuse Enlargement] : the thyroid was not enlarged [Auscultation Breath Sounds / Voice Sounds] : lungs were clear to auscultation bilaterally [Heart Rate And Rhythm] : heart rate was normal and rhythm regular [Heart Sounds] : normal S1 and S2 [Heart Sounds Gallop] : no gallops [Murmurs] : no murmurs [Heart Sounds Pericardial Friction Rub] : no pericardial rub [Full Pulse] : the pedal pulses are present [Edema] : there was no peripheral edema [Bowel Sounds] : normal bowel sounds [Abdomen Soft] : soft [Abdomen Tenderness] : non-tender [Abdomen Mass (___ Cm)] : no abdominal mass palpated [Costovertebral Angle Tenderness] : no CVA tenderness [No Palpable Adenopathy] : no palpable adenopathy [Musculoskeletal - Swelling] : no joint swelling [Nail Clubbing] : no clubbing  or cyanosis of the fingernails [Motor Tone] : muscle strength and tone were normal [Skin Color & Pigmentation] : normal skin color and pigmentation [] : no rash [FreeTextEntry1] : hallux stump, some dehiscence of incision anteriorly [Deep Tendon Reflexes (DTR)] : deep tendon reflexes were 2+ and symmetric [Sensation] : the sensory exam was normal to light touch and pinprick [No Focal Deficits] : no focal deficits [Oriented To Time, Place, And Person] : oriented to person, place, and time [Affect] : the affect was normal

## 2020-10-01 NOTE — ASSESSMENT
[FreeTextEntry1] : - clinically stable\par - separation at wound may be from walking\par \par - continue wound care\par \par continue antibiotics thru 10/15\par - will maintain PICC line\par ? need to extend course if not improving\par \par Needs good Dm control\par suggest PMD here.  [Treatment Education] : treatment education [Treatment Adherence] : treatment adherence [Risk Reduction] : risk reduction

## 2020-10-12 ENCOUNTER — APPOINTMENT (OUTPATIENT)
Dept: ENDOCRINOLOGY | Facility: CLINIC | Age: 55
End: 2020-10-12
Payer: COMMERCIAL

## 2020-10-12 VITALS
BODY MASS INDEX: 35.31 KG/M2 | HEIGHT: 67 IN | SYSTOLIC BLOOD PRESSURE: 122 MMHG | DIASTOLIC BLOOD PRESSURE: 78 MMHG | WEIGHT: 225 LBS

## 2020-10-12 PROCEDURE — 99204 OFFICE O/P NEW MOD 45 MIN: CPT

## 2020-10-12 RX ORDER — FLUCONAZOLE 150 MG/1
150 TABLET ORAL
Qty: 1 | Refills: 0 | Status: DISCONTINUED | COMMUNITY
Start: 2019-01-22 | End: 2020-10-12

## 2020-10-12 NOTE — HISTORY OF PRESENT ILLNESS
[FreeTextEntry1] : HFU: 54 yo female with history of DM type 2, TIA, HLD, R hemicolectomy, primary \par anastomosis, ventral hernia repair with biologic mesh for strangulated ventral \par hernia who now presents with 'blood blister' on toe. Patient reports she had 1 \par month ago removal of a wart on her right 1st toe with Dr. Mccollum podiatry, has \par not had issues with it since but yesterday noted that after stepping out of the \par shower she has blister on her toe. She called her podiatrist that night to make \par appointment and ended having nausea and vomiting. The following day she had \par enlargement of her toe and fever at home. \par In the ED for N/V ED ordered CT abdomen that did not show obstruction but did \par show large ventral wall seroma, seen by surgery no sign of infection.  Admitted \par for right foot OM s/p resection by podiatry and planned for IV antibiotics via \par picc line by ID.  noted to have incidental finding of ovarian cyst with MRI \par confirming 7.8cm right simple ovarian cyst. \par Noted to have uncontrolled diabetes and started on insulin therapy\par \par Pt states she got a blister in April from wearing moccasins, went to podiatrist who started to shave her calluses. The shaving caused an infection. Turned Septic this September and a right big toe and beyond amputation.\par \par T2DM\par Severity: uncontrolled \par Onset: routine labs\par Duration: at least 10 years ago\par Modifying Factors: newly on insulin\par \par Currently has a PICC line and on IV abx \par \par SMBG\par Checks BS 4x  a day\par On average fasting 140-250s\par Before lunch 100s\par Before dinner 100s\par Bedtime 100s\par \par HGA1C 10.1 9/15/2020\par Current drug regimen\par Basaglar 25 units QHS\par Humalog 8 units before meals \par  mg BID\par \par Eye exam: Plan for an apt for the end of 2020, denies DR\booker Foot exam: Dr. Guerrier office, Dr. Mccann did surgery- goes every week \par Kidney disease: denies \par Heart disease: denies\par \par Weight: stable\par Diet:\par B: 2 fried eggs and ham, 1/2 cup coffee with milk \par L: whole wheat toast with pb\par D: shrimp,  cocktail sauce \par S: sugar free jello/pudding, cool whip\par Drinking: water \par Exercise: currently recovering from surgery, using a walker \par Smoking: denies\par \par HLD\par Need updated lipid panel\par Atorvastatin 20 mg daily\par \par \par HTN\par BP in office 122/78\par Lisinopril 10 mg otto

## 2020-10-12 NOTE — ASSESSMENT
[FreeTextEntry1] : T2DM\par -Long discussion had with patient about severity of disease, insulin action times, plan of care, goals of diabetes, and potential complications. Pt is well educated and happy to learn about her disease management. \par -Fasting blood sugars not at goal, increase Basgalar to 28 units QHS\par -Continue 8 units of Humalog before meals\par -Continue  mg BID\par -Continue to check FS 4x a day, pt would benefit from catie. Will rx\par -Continue to watch diet, pt doing very well with her diet but she would like more education. NV to be with CDE\par -When cleared, increase exercise as tolerated, goal of 30 mins a day 5x a week\par -Continue to follow up with all specialists including ophtho, podiatry\par \par HTN\par -BP stable in office, continue aceI\par \par HLD\par -Continue statin\par \par RTO 2-3 weeks CDE, 5-6 weeks NP

## 2020-10-12 NOTE — REVIEW OF SYSTEMS
[Fatigue] : fatigue [Polyuria] : polyuria [Recent Weight Gain (___ Lbs)] : no recent weight gain [Recent Weight Loss (___ Lbs)] : no recent weight loss [Visual Field Defect] : no visual field defect [Blurred Vision] : no blurred vision [Dysphagia] : no dysphagia [Neck Pain] : no neck pain [Dysphonia] : no dysphonia [Chest Pain] : no chest pain [Palpitations] : no palpitations [Shortness Of Breath] : no shortness of breath [Nausea] : no nausea [Constipation] : no constipation [Vomiting] : no vomiting [Diarrhea] : no diarrhea [Headaches] : no headaches [Polydipsia] : no polydipsia [FreeTextEntry7] : history of IBS with diarrhea

## 2020-10-12 NOTE — PHYSICAL EXAM
[Alert] : alert [Well Nourished] : well nourished [No Acute Distress] : no acute distress [Normal Sclera/Conjunctiva] : normal sclera/conjunctiva [Normal Hearing] : hearing was normal [Thyroid Not Enlarged] : the thyroid was not enlarged [No Accessory Muscle Use] : no accessory muscle use [Clear to Auscultation] : lungs were clear to auscultation bilaterally [Normal S1, S2] : normal S1 and S2 [Normal Rate] : heart rate was normal [No Edema] : no peripheral edema [Not Tender] : non-tender [Soft] : abdomen soft [No Rash] : no rash [No Tremors] : no tremors [Oriented x3] : oriented to person, place, and time [de-identified] : Uses walker, has an active bandage/wound on right foot [de-identified] : right arm picc line in place

## 2020-10-13 RX ORDER — INSULIN LISPRO 100 [IU]/ML
100 INJECTION, SOLUTION INTRAVENOUS; SUBCUTANEOUS
Qty: 1 | Refills: 1 | Status: DISCONTINUED | COMMUNITY
Start: 2020-10-12 | End: 2020-10-13

## 2020-10-15 ENCOUNTER — APPOINTMENT (OUTPATIENT)
Dept: INTERNAL MEDICINE | Facility: CLINIC | Age: 55
End: 2020-10-15
Payer: COMMERCIAL

## 2020-10-15 VITALS
HEIGHT: 67 IN | SYSTOLIC BLOOD PRESSURE: 147 MMHG | RESPIRATION RATE: 16 BRPM | TEMPERATURE: 97.6 F | HEART RATE: 97 BPM | DIASTOLIC BLOOD PRESSURE: 81 MMHG | WEIGHT: 225 LBS | BODY MASS INDEX: 35.31 KG/M2

## 2020-10-15 PROCEDURE — 99215 OFFICE O/P EST HI 40 MIN: CPT

## 2020-10-15 NOTE — DATA REVIEWED
[No studies available for review at this time.] : No studies available for review at this time. [FreeTextEntry1] : \par Labs:\par \par  13.\par 11.25 )-----------( 322 ( 20 Sep 2020 07:53 )\par  39.5\par \par  09-20\par \par 136 | 100 | 19.0\par ----------------------------< 267<H>\par 4.3 | 22.0 | 0.59\par \par Ca 9.2 20 Sep 2020 07:53\par Phos 3.5 09-20\par Mg 1.9 09-20\par \par TPro 6.9 / Alb 3.1<L> / TBili 0.3<L> / DBili x / AST 50<H> /\par ALT 49<H> / AlkPhos 365<H> 09-20\par \par \par Culture - Tissue with Gram Stain (collected 09-18-20 @ 03:24)\par Source: .Tissue 1st metatarsal clear margin\par Gram Stain (09-18-20 @ 07:40):\par  No polymorphonuclear cells seen per low power field\par  No organisms seen per oil power field\par \par Culture - Surgical Swab (collected 09-14-20 @ 22:12)\par Source: .Surgical Swab right hallux\par Final Report (09-18-20 @ 20:40):\par  Numerous Streptococcus anginosus "Susceptibilities not performed"\par  Few Corynebacterium species "Susceptibilities not performed"\par  Few Staphylococcus aureus\par Organism: Staphylococcus aureus (09-18-20 @ 20:40)\par Organism: Staphylococcus aureus (09-18-20 @ 20:40)\par  Sensitivities:\par  - Ampicillin/Sulbactam: S <=8/4\par  - Cefazolin: S <=4\par  - Clindamycin: S <=0.5\par  - Erythromycin: S <=0.5\par  - Gentamicin: S <=4 Should not be used as monotherapy\par  - Oxacillin: S <=0.25\par  - RIF- Rifampin: S <=1 Should not be used as monotherapy\par  - Tetra/Doxy: S <=4\par  - Trimethoprim/Sulfamethoxazole: S <=0.5/9.5\par  - Vancomycin: S 1\par  Method Type: KAYLEY\par \par Culture - Urine (collected 09-14-20 @ 08:14)\par Source: .Urine Clean Catch (Midstream)\par Final Report (09-15-20 @ 07:38):\par  No growth\par \par Culture - Blood (collected 09-13-20 @ 21:18)\par Source: .Blood Blood-Peripheral\par Final Report (09-18-20 @ 23:00):\par  No growth at 5 days.\par \par Culture - Blood (collected 09-13-20 @ 20:26)\par Source: .Blood Blood-Peripheral\par Final Report (09-18-20 @ 21:00):\par  No growth at 5 days.\par \par \par Creatinine, Serum: 0.59 mg/dL (09-20-20 @ 07:53)\par Creatinine, Serum: 0.60 mg/dL (09-17-20 @ 07:47)\par Creatinine, Serum: 0.64 mg/dL (09-16-20 @ 17:22)\par \par WBC Count: 11.25 K/uL (09-20-20 @ 07:53)\par WBC Count: 10.68 K/uL (09-17-20 @ 07:47)\par WBC Count: 9.06 K/uL (09-16-20 @ 17:22)\par \par \par COVID-19 PCR: NotDetec (09-13-20 @ 21:35)\par \par \par Alkaline Phosphatase, Serum: 365 U/L (09-20-20 @ 07:53)\par Alanine Aminotransferase (ALT/SGPT): 49 U/L (09-20-20 @ 07:53)\par Aspartate Aminotransferase (AST/SGOT): 50 U/L (09-20-20 @ 07:53)\par Bilirubin Total, Serum: 0.3 mg/dL (09-20-20 @ 07:53)\par \par \par \par  < from: MR Foot No Cont, Right (09.16.20 @ 10:28) >\par \par  EXAM: MR FOOT RT\par \par PROCEDURE DATE: 09/16/2020\par \par \par \par INTERPRETATION: Clinical indication: Toe swelling and discoloration with an\par open wound.\par \par Multiplanar multisequence MRI of the right foot was performed from the level of\par the toes to the level of the talonavicular joint without intravenous contrast.\par \par FINDINGS:\par \par There is marked maceration and deficiency of the soft tissues along the medial\par plantar aspect of the first toe extending from the distal phalanx to\par theinterphalangeal joint. There appears to be exposure of the plantar aspect of\par the first distal phalanx. There is mild subcortical osseous edema involving the\par distal phalanx of the first toe with slightly decreased subcortical T1 marrow\par signal concerning for early osteomyelitis. There is diffuse cellulitis\par throughout the first toe which extends along the dorsal aspect of the foot.\par There is small fluid within the first toe flexor tendon sheath extending to the\par level of the midfoot consistent with mild tenosynovitis.\par \par There is osseous edema centered about the second and third tarsometatarsal\par articulations consistent with osseous stress reaction. Osseous stress reaction\par about the second tarsometatarsal articulation extends from the base of the\par second metatarsal to the mid shaft. These findings are seen in the setting of\par moderate to severe arthrosis. There is no evidence of acute fracture.\par \par There is mild hallux valgus with moderate first metatarsophalangeal and hallux\par sesamoid joint arthrosis. There is prominent subchondral cystic change along\par the plantar aspect of the first metatarsal head. There is a nonspecific\par moderate first metatarsal phalangeal joint effusion. Correlate clinically to\par exclude superimposed infection.\par \par There is edema and fatty atrophy throughout the plantar musculature consistent\par with denervation related changes. No acute tendinous injury is demonstrated.\par The Lisfranc ligament is preserved.\par \par IMPRESSION:\par \par Extensive maceration and deficiency of the soft tissues about the medial\par plantar aspect of the first toe. Findings suggestive of early subcortical\par osteomyelitis along the plantar aspect of the distal phalanx.\par \par Mild tenosynovitis within the first toe flexor tendon.\par \par Moderate first metatarsal phalangeal joint arthrosis with prominent subchondral\par cystic change along the plantar aspect of the first metatarsal head.\par Nonspecific moderate first metatarsal phalangeal joint effusion. Correlate\par clinically to this exclude superimposed infection.\par \par Prominent osseous stress reaction centered about the second and third\par tarsometatarsal articulations.\par \par \par \par TANIKA HEIN M.D., ATTENDING RADIOLOGIST\par This document has been electronically signed. Sep 16 2020 10:52AM\par \par < end of copied text >

## 2020-10-15 NOTE — PHYSICAL EXAM
[General Appearance - Alert] : alert [General Appearance - In No Acute Distress] : in no acute distress [PERRL With Normal Accommodation] : pupils were equal in size, round, reactive to light [Sclera] : the sclera and conjunctiva were normal [Extraocular Movements] : extraocular movements were intact [Outer Ear] : the ears and nose were normal in appearance [Oropharynx] : the oropharynx was normal with no thrush [Neck Appearance] : the appearance of the neck was normal [Neck Cervical Mass (___cm)] : no neck mass was observed [Thyroid Diffuse Enlargement] : the thyroid was not enlarged [Jugular Venous Distention Increased] : there was no jugular-venous distention [Auscultation Breath Sounds / Voice Sounds] : lungs were clear to auscultation bilaterally [Heart Rate And Rhythm] : heart rate was normal and rhythm regular [Heart Sounds] : normal S1 and S2 [Murmurs] : no murmurs [Heart Sounds Gallop] : no gallops [Heart Sounds Pericardial Friction Rub] : no pericardial rub [Full Pulse] : the pedal pulses are present [Edema] : there was no peripheral edema [Bowel Sounds] : normal bowel sounds [Abdomen Soft] : soft [Abdomen Tenderness] : non-tender [Abdomen Mass (___ Cm)] : no abdominal mass palpated [Costovertebral Angle Tenderness] : no CVA tenderness [No Palpable Adenopathy] : no palpable adenopathy [Musculoskeletal - Swelling] : no joint swelling [Nail Clubbing] : no clubbing  or cyanosis of the fingernails [Motor Tone] : muscle strength and tone were normal [Skin Color & Pigmentation] : normal skin color and pigmentation [] : no rash [Deep Tendon Reflexes (DTR)] : deep tendon reflexes were 2+ and symmetric [Sensation] : the sensory exam was normal to light touch and pinprick [No Focal Deficits] : no focal deficits [Oriented To Time, Place, And Person] : oriented to person, place, and time [Affect] : the affect was normal [FreeTextEntry1] : hallux stump, some dehiscence of incision anteriorly; granulation tissue at base of wound,  some lateral edge with devitalized tissue

## 2020-10-15 NOTE — ASSESSMENT
[Treatment Education] : treatment education [Treatment Adherence] : treatment adherence [Risk Reduction] : risk reduction [FreeTextEntry1] : - clinically stable\par - separation at wound may be from walking\par - continue wound care\par \par - completed course of IV antibiotics\par - clinically well appearing, \par \par still needs to close\par - will change IV cefazolin to PO Augmentin 875mg PO BID x 30 days\par \par \par Needs good Dm control\par \par \par Suggest wound care with XEROFORM, \par rx provided. \par \par to follow up in 2 weeks.  \par \par - will obtain labs at next visit.

## 2020-10-15 NOTE — HISTORY OF PRESENT ILLNESS
[FreeTextEntry1] : This 54 yo female with history of DM type 2, TIA, HLD, R hemicolectomy, primary\par anastomosis, ventral hernia repair with biologic mesh for strangulated ventral\par hernia\par last seen at [facility] on 9/21/2020 for:\par \par - diabetes\par - neuropathy\par - right foot infection\par Right foot osteomyelitis\par - WBC elevation\par \par - A1c is elevated : on this patient\par A1C with Estimated Average Glucose Result: 10.1 % (09-15-20 @ 08:04)\par \par - OM confirm in right hallux distal phalanx\par s/p Partial amputation of first ray of right foot by open approach on 9/17\par WOUND cx with Strep sanguinis and Staph aureus as well\par surgical culture remain negative\par \par patient is here for followup. \par she has finished last day of ABX on 10/14/2020\par \par \par she is here for follow up wth her . \par \par still with some dehiscence of surgical incision anteriorly.  s/p debridment of devitalized tissue on 10/14/2020 at podiatry - Dr. Yoo\par \par PICC Line right arm., occ bothers her. \par

## 2020-10-29 ENCOUNTER — APPOINTMENT (OUTPATIENT)
Dept: INTERNAL MEDICINE | Facility: CLINIC | Age: 55
End: 2020-10-29
Payer: COMMERCIAL

## 2020-10-29 VITALS
HEART RATE: 107 BPM | WEIGHT: 226 LBS | DIASTOLIC BLOOD PRESSURE: 85 MMHG | RESPIRATION RATE: 16 BRPM | SYSTOLIC BLOOD PRESSURE: 129 MMHG | BODY MASS INDEX: 35.47 KG/M2 | HEIGHT: 67 IN

## 2020-10-29 DIAGNOSIS — B37.9 CANDIDIASIS, UNSPECIFIED: ICD-10-CM

## 2020-10-29 PROCEDURE — 36415 COLL VENOUS BLD VENIPUNCTURE: CPT

## 2020-10-29 PROCEDURE — 99072 ADDL SUPL MATRL&STAF TM PHE: CPT

## 2020-10-29 PROCEDURE — 99214 OFFICE O/P EST MOD 30 MIN: CPT | Mod: 25

## 2020-10-29 NOTE — HISTORY OF PRESENT ILLNESS
[FreeTextEntry1] : This 56 yo female with history of DM type 2, TIA, HLD, R hemicolectomy, primary\par anastomosis, ventral hernia repair with biologic mesh for strangulated ventral\par hernia.  \par \par diabetes with A1c of 10.1 % (09-15-20 @ 08:04)0, neuropathy, right foot infection, Right foot osteomyelitis, and  WBC elevation.\par s/p Partial amputation of first ray of right foot by open approach on 9/17\par WOUND cx with Strep sanguinis and Staph aureus as well\par surgical culture remain negative\par \par Last seen at [facility] on 9/21/2020 \par she has finished last day of ABX on 10/14/2020\par \par last seen in office on 10.15 and started on PO Augmentin. \par \par \par Wound getting smaller\par \par went to Dr. Yoo 10/28/2020\par had some debridement. \par bleed a lot more \par also had some collagen placed. \par \par tolerating antibiotics\par thinks she has yeast infection. tried OTC cream, no improvement.  has a lot of vaginal itching

## 2020-10-29 NOTE — PHYSICAL EXAM
[General Appearance - Alert] : alert [General Appearance - In No Acute Distress] : in no acute distress [Sclera] : the sclera and conjunctiva were normal [PERRL With Normal Accommodation] : pupils were equal in size, round, reactive to light [Extraocular Movements] : extraocular movements were intact [Outer Ear] : the ears and nose were normal in appearance [Oropharynx] : the oropharynx was normal with no thrush [Neck Appearance] : the appearance of the neck was normal [Neck Cervical Mass (___cm)] : no neck mass was observed [Jugular Venous Distention Increased] : there was no jugular-venous distention [Thyroid Diffuse Enlargement] : the thyroid was not enlarged [Auscultation Breath Sounds / Voice Sounds] : lungs were clear to auscultation bilaterally [Heart Rate And Rhythm] : heart rate was normal and rhythm regular [Heart Sounds] : normal S1 and S2 [Heart Sounds Gallop] : no gallops [Murmurs] : no murmurs [Heart Sounds Pericardial Friction Rub] : no pericardial rub [Full Pulse] : the pedal pulses are present [Edema] : there was no peripheral edema [Bowel Sounds] : normal bowel sounds [Abdomen Soft] : soft [Abdomen Tenderness] : non-tender [Abdomen Mass (___ Cm)] : no abdominal mass palpated [Costovertebral Angle Tenderness] : no CVA tenderness [No Palpable Adenopathy] : no palpable adenopathy [Musculoskeletal - Swelling] : no joint swelling [Nail Clubbing] : no clubbing  or cyanosis of the fingernails [Motor Tone] : muscle strength and tone were normal [Skin Color & Pigmentation] : normal skin color and pigmentation [] : no rash [Deep Tendon Reflexes (DTR)] : deep tendon reflexes were 2+ and symmetric [Sensation] : the sensory exam was normal to light touch and pinprick [No Focal Deficits] : no focal deficits [Oriented To Time, Place, And Person] : oriented to person, place, and time [Affect] : the affect was normal [FreeTextEntry1] : distal hallux site with smaller area of dehiscence.  clean base with some  bleeding. no foul smell

## 2020-10-29 NOTE — ASSESSMENT
[Treatment Education] : treatment education [Treatment Adherence] : treatment adherence [Risk Reduction] : risk reduction [FreeTextEntry1] : - clinically IMPROVING\par - wound getting smaller\par - weight bearing as per PODIATRY recommendations\par - continue wound care daily\par \par - completed course of IV antibiotics\par - continue  PO Augmentin 875mg PO BID x 30 days, expected to be complete 11/13\par \par labs today - CBC CMP ESR CRP\par \par \par \par \par Needs good Dm control\par \par \par Suggest wound care with XEROFORM, \par rx provided. \par \par to follow up in 2 weeks.  \par \par - will obtain labs at next visit.

## 2020-10-30 LAB
ALBUMIN SERPL ELPH-MCNC: 4.3 G/DL
ALP BLD-CCNC: 132 U/L
ALT SERPL-CCNC: 9 U/L
ANION GAP SERPL CALC-SCNC: 15 MMOL/L
AST SERPL-CCNC: 9 U/L
BASOPHILS # BLD AUTO: 0.05 K/UL
BASOPHILS NFR BLD AUTO: 0.6 %
BILIRUB SERPL-MCNC: 0.5 MG/DL
BUN SERPL-MCNC: 19 MG/DL
CALCIUM SERPL-MCNC: 9.4 MG/DL
CHLORIDE SERPL-SCNC: 101 MMOL/L
CO2 SERPL-SCNC: 23 MMOL/L
CREAT SERPL-MCNC: 0.72 MG/DL
CRP SERPL-MCNC: 0.34 MG/DL
EOSINOPHIL # BLD AUTO: 0.16 K/UL
EOSINOPHIL NFR BLD AUTO: 1.9 %
ERYTHROCYTE [SEDIMENTATION RATE] IN BLOOD BY WESTERGREN METHOD: 36 MM/HR
GLUCOSE SERPL-MCNC: 202 MG/DL
HCT VFR BLD CALC: 45.2 %
HGB BLD-MCNC: 14.3 G/DL
IMM GRANULOCYTES NFR BLD AUTO: 0.4 %
LYMPHOCYTES # BLD AUTO: 2.46 K/UL
LYMPHOCYTES NFR BLD AUTO: 28.8 %
MAN DIFF?: NORMAL
MCHC RBC-ENTMCNC: 30.5 PG
MCHC RBC-ENTMCNC: 31.6 GM/DL
MCV RBC AUTO: 96.4 FL
MONOCYTES # BLD AUTO: 0.53 K/UL
MONOCYTES NFR BLD AUTO: 6.2 %
NEUTROPHILS # BLD AUTO: 5.32 K/UL
NEUTROPHILS NFR BLD AUTO: 62.1 %
PLATELET # BLD AUTO: 253 K/UL
POTASSIUM SERPL-SCNC: 4.5 MMOL/L
PROT SERPL-MCNC: 6.8 G/DL
RBC # BLD: 4.69 M/UL
RBC # FLD: 13.1 %
SODIUM SERPL-SCNC: 139 MMOL/L
WBC # FLD AUTO: 8.55 K/UL

## 2020-11-02 ENCOUNTER — APPOINTMENT (OUTPATIENT)
Dept: ENDOCRINOLOGY | Facility: CLINIC | Age: 55
End: 2020-11-02
Payer: COMMERCIAL

## 2020-11-02 PROCEDURE — 99072 ADDL SUPL MATRL&STAF TM PHE: CPT

## 2020-11-02 PROCEDURE — G0108 DIAB MANAGE TRN  PER INDIV: CPT

## 2020-11-12 ENCOUNTER — APPOINTMENT (OUTPATIENT)
Dept: INTERNAL MEDICINE | Facility: CLINIC | Age: 55
End: 2020-11-12
Payer: COMMERCIAL

## 2020-11-12 VITALS
HEIGHT: 67 IN | WEIGHT: 226 LBS | SYSTOLIC BLOOD PRESSURE: 146 MMHG | BODY MASS INDEX: 35.47 KG/M2 | RESPIRATION RATE: 16 BRPM | TEMPERATURE: 97 F | HEART RATE: 97 BPM | DIASTOLIC BLOOD PRESSURE: 80 MMHG

## 2020-11-12 PROCEDURE — 99072 ADDL SUPL MATRL&STAF TM PHE: CPT

## 2020-11-12 PROCEDURE — 99214 OFFICE O/P EST MOD 30 MIN: CPT

## 2020-11-12 NOTE — PHYSICAL EXAM
[General Appearance - Alert] : alert [General Appearance - In No Acute Distress] : in no acute distress [Sclera] : the sclera and conjunctiva were normal [PERRL With Normal Accommodation] : pupils were equal in size, round, reactive to light [Extraocular Movements] : extraocular movements were intact [Outer Ear] : the ears and nose were normal in appearance [Oropharynx] : the oropharynx was normal with no thrush [Neck Appearance] : the appearance of the neck was normal [Neck Cervical Mass (___cm)] : no neck mass was observed [Jugular Venous Distention Increased] : there was no jugular-venous distention [Thyroid Diffuse Enlargement] : the thyroid was not enlarged [Auscultation Breath Sounds / Voice Sounds] : lungs were clear to auscultation bilaterally [Heart Rate And Rhythm] : heart rate was normal and rhythm regular [Heart Sounds] : normal S1 and S2 [Heart Sounds Gallop] : no gallops [Murmurs] : no murmurs [Heart Sounds Pericardial Friction Rub] : no pericardial rub [Full Pulse] : the pedal pulses are present [Edema] : there was no peripheral edema [Bowel Sounds] : normal bowel sounds [Abdomen Soft] : soft [Abdomen Tenderness] : non-tender [Abdomen Mass (___ Cm)] : no abdominal mass palpated [Costovertebral Angle Tenderness] : no CVA tenderness [No Palpable Adenopathy] : no palpable adenopathy [Musculoskeletal - Swelling] : no joint swelling [Nail Clubbing] : no clubbing  or cyanosis of the fingernails [Motor Tone] : muscle strength and tone were normal [Skin Color & Pigmentation] : normal skin color and pigmentation [] : no rash [Deep Tendon Reflexes (DTR)] : deep tendon reflexes were 2+ and symmetric [Sensation] : the sensory exam was normal to light touch and pinprick [No Focal Deficits] : no focal deficits [Oriented To Time, Place, And Person] : oriented to person, place, and time [Affect] : the affect was normal [FreeTextEntry1] : distal hallux site with small wound, SUBCENTIMETER.  clean base with no significant bleeding. no foul smell

## 2020-11-12 NOTE — HISTORY OF PRESENT ILLNESS
[FreeTextEntry1] : This 54 yo female with history of DM type 2, TIA, HLD, R hemicolectomy, primary\par anastomosis, ventral hernia repair with biologic mesh for strangulated ventral\par hernia.  \par \par diabetes with A1c of 10.1 % (09-15-20 @ 08:04)0, neuropathy, right foot infection, Right foot osteomyelitis, and  WBC elevation.\par s/p Partial amputation of first ray of right foot by open approach on 9/17\par WOUND cx with Strep sanguinis and Staph aureus as well\par surgical culture remain negative\par \par Last seen at [facility] on 9/21/2020 \par she has finished last day of ABX on 10/14/2020\par \par last seen in office on 10.15 and started on PO Augmentin. \par \par  \par Since last visit,. wound still getting debrided at dr. Yoo's office. \par now using a SILVER COLLAGEN cream\par \par no fevers. \par tolerating oral antibiotics. \par \par no foul drainage. \par

## 2020-11-12 NOTE — ASSESSMENT
[Treatment Education] : treatment education [Treatment Adherence] : treatment adherence [Risk Reduction] : risk reduction [FreeTextEntry1] : - clinically IMPROVING\par - wound getting smaller compared to last week\par - weight bearing as per PODIATRY recommendations\par - continue wound care daily\par - agree With use of Siver COLLAGEN cream\par \par  \par - continue  PO Augmentin 875mg PO BID \par - will renew until 3 more weeks\par \par prior labs reviewed. \par repeat labs at next visit. \par \par labs today - CBC CMP ESR CRP\par \par \par \par \par Needs good Dm control\par \par \par Suggest wound care with XEROFORM, \par rx provided. \par \par to follow up in 2 weeks.  \par \par - will obtain labs at next visit.

## 2020-11-24 ENCOUNTER — APPOINTMENT (OUTPATIENT)
Dept: ENDOCRINOLOGY | Facility: CLINIC | Age: 55
End: 2020-11-24
Payer: COMMERCIAL

## 2020-11-24 VITALS
SYSTOLIC BLOOD PRESSURE: 148 MMHG | BODY MASS INDEX: 35.94 KG/M2 | TEMPERATURE: 97.4 F | HEIGHT: 67 IN | WEIGHT: 229 LBS | DIASTOLIC BLOOD PRESSURE: 76 MMHG

## 2020-11-24 PROCEDURE — 99214 OFFICE O/P EST MOD 30 MIN: CPT

## 2020-11-24 RX ORDER — FLASH GLUCOSE SCANNING READER
EACH MISCELLANEOUS
Qty: 1 | Refills: 0 | Status: DISCONTINUED | COMMUNITY
Start: 2020-10-12 | End: 2020-11-24

## 2020-11-24 RX ORDER — FLASH GLUCOSE SENSOR
KIT MISCELLANEOUS
Qty: 6 | Refills: 1 | Status: DISCONTINUED | COMMUNITY
Start: 2020-10-12 | End: 2020-11-24

## 2020-11-24 RX ORDER — BISMUTH TRIBROMOPH/PETROLATUM 5"X9"
BANDAGE TOPICAL DAILY
Qty: 30 | Refills: 0 | Status: DISCONTINUED | OUTPATIENT
Start: 2020-10-15 | End: 2020-11-24

## 2020-11-24 RX ORDER — LANCETS
EACH MISCELLANEOUS
Qty: 4 | Refills: 1 | Status: DISCONTINUED | COMMUNITY
Start: 2020-10-12 | End: 2020-11-24

## 2020-11-24 RX ORDER — FLUCONAZOLE 200 MG/1
200 TABLET ORAL
Qty: 3 | Refills: 0 | Status: DISCONTINUED | COMMUNITY
Start: 2020-10-29 | End: 2020-11-24

## 2020-11-24 NOTE — REVIEW OF SYSTEMS
[Recent Weight Loss (___ Lbs)] : recent weight loss: [unfilled] lbs [Fatigue] : no fatigue [Recent Weight Gain (___ Lbs)] : no recent weight gain [Visual Field Defect] : no visual field defect [Blurred Vision] : no blurred vision [Dysphagia] : no dysphagia [Neck Pain] : no neck pain [Dysphonia] : no dysphonia [Chest Pain] : no chest pain [Shortness Of Breath] : no shortness of breath [Nausea] : no nausea [Constipation] : no constipation [Vomiting] : no vomiting [Diarrhea] : no diarrhea [Polyuria] : no polyuria [Polydipsia] : no polydipsia

## 2020-11-24 NOTE — PHYSICAL EXAM
[Alert] : alert [Well Nourished] : well nourished [No Acute Distress] : no acute distress [Normal Sclera/Conjunctiva] : normal sclera/conjunctiva [Normal Hearing] : hearing was normal [Thyroid Not Enlarged] : the thyroid was not enlarged [No Accessory Muscle Use] : no accessory muscle use [Clear to Auscultation] : lungs were clear to auscultation bilaterally [Normal S1, S2] : normal S1 and S2 [Normal Rate] : heart rate was normal [No Edema] : no peripheral edema [Not Tender] : non-tender [Soft] : abdomen soft [No Rash] : no rash [No Tremors] : no tremors [Oriented x3] : oriented to person, place, and time [de-identified] : Uses walker, has an active bandage/wound on right foot

## 2020-11-24 NOTE — ASSESSMENT
[FreeTextEntry1] : T2DM\par -Pt doing much better. Once she is healed from foot wound, we will try to transition off insulin\par -Fasting blood sugars not at goal, increase Basgalar to 28 units QHS\par -Can decrease to 6- 8 units of Humalog before meals, based off how many carbs she is having/eating a larger meal etc \par -Continue  mg BID\par -Continue to check FS 4x a day\par -Continue to watch diet, pt doing very well with her diet\par -When cleared, increase exercise as tolerated, goal of 30 mins a day 5x a week\par -Continue to follow up with all specialists including ophtho, podiatry\par \par HTN\par -BP stable in office, continue aceI\par \par HLD\par -Continue statin\par \par Fasting labs due in 12/2020\par RTO 4 weeks

## 2020-11-24 NOTE — HISTORY OF PRESENT ILLNESS
[FreeTextEntry1] : HFU: 56 yo female with history of DM type 2, TIA, HLD, R hemicolectomy, primary \par anastomosis, ventral hernia repair with biologic mesh for strangulated ventral \par hernia who now presents with 'blood blister' on toe. Patient reports she had 1 \par month ago removal of a wart on her right 1st toe with Dr. Mccollum podiatry, has \par not had issues with it since but yesterday noted that after stepping out of the \par shower she has blister on her toe. She called her podiatrist that night to make \par appointment and ended having nausea and vomiting. The following day she had \par enlargement of her toe and fever at home. \par In the ED for N/V ED ordered CT abdomen that did not show obstruction but did \par show large ventral wall seroma, seen by surgery no sign of infection.  Admitted \par for right foot OM s/p resection by podiatry and planned for IV antibiotics via \par picc line by ID.  noted to have incidental finding of ovarian cyst with MRI \par confirming 7.8cm right simple ovarian cyst. \par Noted to have uncontrolled diabetes and started on insulin therapy\par \par Pt states she got a blister in April from wearing moccasins, went to podiatrist who started to shave her calluses. The shaving caused an infection. Turned Septic this September and a right big toe and beyond amputation.\par \par Pt on augmentin at this time. No longer experiencing yeast infection.\par Sees Dr. Mccann every Wednesday- podiatry\par Doing much better\par \par T2DM\par Severity: uncontrolled \par Onset: routine labs\par Duration: at least 10 years ago\par Modifying Factors: newly on insulin\par \par Currently has a PICC line and on IV abx \par \par SMBG\par Checks BS 4x  a day\par On average fasting \par Before lunch 80-100s\par Before dinner 80-160s\par Bedtime 100s\par \par HGA1C 10.1 9/15/2020\par \par Current drug regimen\par Basaglar 30 units QHS\par Humalog 8 units before meals \par  mg BID\par \par Eye exam: Plan for an apt for the end of 2020, denies DR\par Foot exam: Dr. Guerrier office, Dr. Mccann did surgery- goes every week \par Kidney disease: denies \par Heart disease: denies\par \par Weight: losing approx. 10 lbs\par Diet:\par B: 2 fried eggs and ham, 1/2 cup coffee with milk \par L: whole wheat toast with pb\par D: shrimp,  cocktail sauce \par S: sugar free jello/pudding, cool whip\par Drinking: water \par Exercise: currently recovering from surgery, using a walker \par Smoking: denies\par \par HLD\par Need updated lipid panel\par Atorvastatin 20 mg daily\par \par \par HTN\par BP in office 148/76\par Lisinopril 10 mg otto

## 2020-12-03 ENCOUNTER — TRANSCRIPTION ENCOUNTER (OUTPATIENT)
Age: 55
End: 2020-12-03

## 2020-12-07 ENCOUNTER — APPOINTMENT (OUTPATIENT)
Dept: ENDOCRINOLOGY | Facility: CLINIC | Age: 55
End: 2020-12-07

## 2020-12-10 ENCOUNTER — APPOINTMENT (OUTPATIENT)
Dept: INTERNAL MEDICINE | Facility: CLINIC | Age: 55
End: 2020-12-10
Payer: COMMERCIAL

## 2020-12-10 VITALS
DIASTOLIC BLOOD PRESSURE: 68 MMHG | BODY MASS INDEX: 35.94 KG/M2 | HEIGHT: 67 IN | SYSTOLIC BLOOD PRESSURE: 120 MMHG | WEIGHT: 229 LBS | TEMPERATURE: 96.7 F

## 2020-12-10 DIAGNOSIS — M86.10 OTHER ACUTE OSTEOMYELITIS, UNSPECIFIED SITE: ICD-10-CM

## 2020-12-10 DIAGNOSIS — A49.9 BACTERIAL INFECTION, UNSPECIFIED: ICD-10-CM

## 2020-12-10 PROCEDURE — 99213 OFFICE O/P EST LOW 20 MIN: CPT | Mod: 25

## 2020-12-10 PROCEDURE — 36415 COLL VENOUS BLD VENIPUNCTURE: CPT

## 2020-12-10 PROCEDURE — 99072 ADDL SUPL MATRL&STAF TM PHE: CPT

## 2020-12-10 RX ORDER — AMOXICILLIN AND CLAVULANATE POTASSIUM 875; 125 MG/1; MG/1
875-125 TABLET, COATED ORAL
Qty: 60 | Refills: 1 | Status: DISCONTINUED | COMMUNITY
Start: 2020-10-15 | End: 2020-12-10

## 2020-12-10 NOTE — PHYSICAL EXAM
[General Appearance - Alert] : alert [General Appearance - In No Acute Distress] : in no acute distress [Sclera] : the sclera and conjunctiva were normal [PERRL With Normal Accommodation] : pupils were equal in size, round, reactive to light [Extraocular Movements] : extraocular movements were intact [Outer Ear] : the ears and nose were normal in appearance [Oropharynx] : the oropharynx was normal with no thrush [Neck Appearance] : the appearance of the neck was normal [Neck Cervical Mass (___cm)] : no neck mass was observed [Jugular Venous Distention Increased] : there was no jugular-venous distention [Thyroid Diffuse Enlargement] : the thyroid was not enlarged [Auscultation Breath Sounds / Voice Sounds] : lungs were clear to auscultation bilaterally [Heart Rate And Rhythm] : heart rate was normal and rhythm regular [Heart Sounds] : normal S1 and S2 [Heart Sounds Gallop] : no gallops [Murmurs] : no murmurs [Heart Sounds Pericardial Friction Rub] : no pericardial rub [Full Pulse] : the pedal pulses are present [Edema] : there was no peripheral edema [Bowel Sounds] : normal bowel sounds [Abdomen Soft] : soft [Abdomen Tenderness] : non-tender [Abdomen Mass (___ Cm)] : no abdominal mass palpated [Costovertebral Angle Tenderness] : no CVA tenderness [No Palpable Adenopathy] : no palpable adenopathy [Musculoskeletal - Swelling] : no joint swelling [Nail Clubbing] : no clubbing  or cyanosis of the fingernails [Motor Tone] : muscle strength and tone were normal [Skin Color & Pigmentation] : normal skin color and pigmentation [] : no rash [Sensation] : the sensory exam was normal to light touch and pinprick [No Focal Deficits] : no focal deficits [Oriented To Time, Place, And Person] : oriented to person, place, and time [Affect] : the affect was normal [FreeTextEntry1] : RIGHT distal hallux sitePRIOR wound, now dried scab., no odor., SURROUDING SKIN is cool to touch [Cranial Nerves] : cranial nerves 2-12 were intact

## 2020-12-10 NOTE — ASSESSMENT
[Treatment Education] : treatment education [Treatment Adherence] : treatment adherence [Risk Reduction] : risk reduction [FreeTextEntry1] : MUCH better\par dried scab at prior site of right wound\par - agree With use of Siver COLLAGEN cream\par \par \par - continue  PO Augmentin 875mg PO BID \par to complete this week - has 3 more pills\par \par continue diabetic care\par \par labs today - CBC CMP ESR CRP\par \par do not anticipate any need for follow ups. \par  \par  \par

## 2020-12-10 NOTE — HISTORY OF PRESENT ILLNESS
[FreeTextEntry1] : folow up for right foot infection\par \par since the last visit on 11/12/2020\par applying silver collagen creme to the foot. \par \par doing better\par dried now\par still with occ weekly debridement at podiatry\par

## 2020-12-11 LAB
ALBUMIN SERPL ELPH-MCNC: 4.5 G/DL
ALP BLD-CCNC: 132 U/L
ALT SERPL-CCNC: 13 U/L
ANION GAP SERPL CALC-SCNC: 15 MMOL/L
AST SERPL-CCNC: 15 U/L
BASOPHILS # BLD AUTO: 0.07 K/UL
BASOPHILS NFR BLD AUTO: 0.6 %
BILIRUB SERPL-MCNC: 0.4 MG/DL
BUN SERPL-MCNC: 23 MG/DL
CALCIUM SERPL-MCNC: 9.7 MG/DL
CHLORIDE SERPL-SCNC: 102 MMOL/L
CO2 SERPL-SCNC: 24 MMOL/L
CREAT SERPL-MCNC: 0.64 MG/DL
CRP SERPL-MCNC: 0.52 MG/DL
EOSINOPHIL # BLD AUTO: 0.16 K/UL
EOSINOPHIL NFR BLD AUTO: 1.5 %
ERYTHROCYTE [SEDIMENTATION RATE] IN BLOOD BY WESTERGREN METHOD: 45 MM/HR
GLUCOSE SERPL-MCNC: 128 MG/DL
HCT VFR BLD CALC: 46.5 %
HGB BLD-MCNC: 15 G/DL
IMM GRANULOCYTES NFR BLD AUTO: 0.6 %
LYMPHOCYTES # BLD AUTO: 3.33 K/UL
LYMPHOCYTES NFR BLD AUTO: 30.8 %
MAN DIFF?: NORMAL
MCHC RBC-ENTMCNC: 31 PG
MCHC RBC-ENTMCNC: 32.3 GM/DL
MCV RBC AUTO: 96.1 FL
MONOCYTES # BLD AUTO: 0.63 K/UL
MONOCYTES NFR BLD AUTO: 5.8 %
NEUTROPHILS # BLD AUTO: 6.55 K/UL
NEUTROPHILS NFR BLD AUTO: 60.7 %
PLATELET # BLD AUTO: 244 K/UL
POTASSIUM SERPL-SCNC: 4.5 MMOL/L
PROT SERPL-MCNC: 6.8 G/DL
RBC # BLD: 4.84 M/UL
RBC # FLD: 13.2 %
SODIUM SERPL-SCNC: 141 MMOL/L
WBC # FLD AUTO: 10.81 K/UL

## 2020-12-22 ENCOUNTER — LABORATORY RESULT (OUTPATIENT)
Age: 55
End: 2020-12-22

## 2020-12-30 ENCOUNTER — APPOINTMENT (OUTPATIENT)
Dept: ENDOCRINOLOGY | Facility: CLINIC | Age: 55
End: 2020-12-30
Payer: COMMERCIAL

## 2020-12-30 VITALS
BODY MASS INDEX: 35.63 KG/M2 | SYSTOLIC BLOOD PRESSURE: 126 MMHG | TEMPERATURE: 97.6 F | DIASTOLIC BLOOD PRESSURE: 62 MMHG | HEIGHT: 67 IN | WEIGHT: 227 LBS

## 2020-12-30 DIAGNOSIS — R19.7 DIARRHEA, UNSPECIFIED: ICD-10-CM

## 2020-12-30 PROCEDURE — 99072 ADDL SUPL MATRL&STAF TM PHE: CPT

## 2020-12-30 PROCEDURE — 99214 OFFICE O/P EST MOD 30 MIN: CPT

## 2020-12-30 RX ORDER — LISINOPRIL 10 MG/1
10 TABLET ORAL
Qty: 90 | Refills: 1 | Status: DISCONTINUED | COMMUNITY
Start: 2020-10-12 | End: 2020-12-30

## 2020-12-30 NOTE — ASSESSMENT
[FreeTextEntry1] : T2DM\par -Pt doing much better. Once she is healed from foot wound, we will try to transition off insulin\par -Fasting blood sugars not at goal, increase Basgalar to 35 units QHS\par -Can decrease to 5 units of Humalog before meals\par -Continue  mg BID\par -Continue to check FS 4x a day\par -Continue to watch diet, pt doing very well with her diet\par -When cleared, increase exercise as tolerated, goal of 30 mins a day 5x a week\par -Continue to follow up with all specialists including ophtho, podiatry\par \par HTN\par -BP stable in office, continue aceI\par \par HLD\par -Continue statin\par \par RTO 6 weeks, goal to begin the transition off insulin by next apt

## 2020-12-30 NOTE — REVIEW OF SYSTEMS
[Fatigue] : fatigue [Recent Weight Gain (___ Lbs)] : no recent weight gain [Recent Weight Loss (___ Lbs)] : no recent weight loss [Visual Field Defect] : no visual field defect [Blurred Vision] : no blurred vision [Dysphagia] : no dysphagia [Dysphonia] : no dysphonia [Chest Pain] : no chest pain [Shortness Of Breath] : no shortness of breath [Constipation] : no constipation [Diarrhea] : no diarrhea [Polyuria] : no polyuria [Polydipsia] : no polydipsia

## 2020-12-30 NOTE — HISTORY OF PRESENT ILLNESS
[FreeTextEntry1] : Great improvement noted. \par Foot injury is almost healed. \par \par T2DM\par Severity: uncontrolled \par Onset: routine labs\par Duration: at least 10 years ago\par Modifying Factors: newly on insulin\par \par \par SMBG\par Checks BS 4x  a day\par On average fasting 100-180\par Before lunch 80-100s\par Before dinner 70-160s\par Bedtime 100s\par \par HGA1C 6.3- 12/2020 - great improvement \par \par Current drug regimen\par Basaglar 33 units QHS\par Humalog 6-8 units before meals , holds less than 90 \par  mg BID\par \par Eye exam: Dec 2020-denies DR\par Foot exam: Dr. Guerrier office, Dr. Mccann did surgery- goes every two weeks\par Kidney disease: denies \par Heart disease: denies\par \par Weight: stable\par Diet:\par B: 2 fried eggs and ham, 1/2 cup coffee with milk \par L: whole wheat toast with pb\par D: shrimp,  cocktail sauce \par S: sugar free jello/pudding, cool whip\par Drinking: water \par Exercise: currently recovering from surgery, not cleared for exercise yet \par Smoking: denies\par \par HLD\par Triglyceride 175, Total cholesterol 142, HDL 34, LDL 73\par Atorvastatin 20 mg daily\par \par \par HTN\par BP in office 126/62\par Lisinopri-HCTZ l 10-12.5 mg otto\par \par Vit B12 - low- 218\par On no supplementation\par \par Vit D Def- low- 20.9\par On no supplementation

## 2020-12-30 NOTE — PHYSICAL EXAM
[Alert] : alert [Well Nourished] : well nourished [No Acute Distress] : no acute distress [Normal Sclera/Conjunctiva] : normal sclera/conjunctiva [Normal Hearing] : hearing was normal [Thyroid Not Enlarged] : the thyroid was not enlarged [No Accessory Muscle Use] : no accessory muscle use [Clear to Auscultation] : lungs were clear to auscultation bilaterally [Normal S1, S2] : normal S1 and S2 [Normal Rate] : heart rate was normal [No Edema] : no peripheral edema [Not Tender] : non-tender [Soft] : abdomen soft [No Rash] : no rash [No Tremors] : no tremors [Oriented x3] : oriented to person, place, and time [de-identified] : Uses walker, has an active bandage/wound on right foot

## 2021-02-10 ENCOUNTER — APPOINTMENT (OUTPATIENT)
Dept: ENDOCRINOLOGY | Facility: CLINIC | Age: 56
End: 2021-02-10
Payer: COMMERCIAL

## 2021-02-10 VITALS
BODY MASS INDEX: 35.47 KG/M2 | DIASTOLIC BLOOD PRESSURE: 70 MMHG | WEIGHT: 226 LBS | SYSTOLIC BLOOD PRESSURE: 150 MMHG | TEMPERATURE: 96.6 F | HEIGHT: 67 IN

## 2021-02-10 PROCEDURE — 99214 OFFICE O/P EST MOD 30 MIN: CPT

## 2021-02-10 PROCEDURE — 99072 ADDL SUPL MATRL&STAF TM PHE: CPT

## 2021-02-10 NOTE — HISTORY OF PRESENT ILLNESS
[FreeTextEntry1] : Great improvement noted. \par Foot injury is almost healed. \par \par T2DM\par Severity: uncontrolled \par Onset: routine labs\par Duration: at least 10 years ago\par Modifying Factors: newly on insulin\par \par \par SMBG\par Checks BS 4x  a day\par On average fasting 100-180\par Before lunch 80-100s\par Before dinner 70-160s\par Bedtime 100s\par \par HGA1C 6.3- 12/2020 - great improvement \par \par Current drug regimen\par Basaglar 35 units QHS\par Humalog 6-8 units before meals , holds less than 90 \par  mg BID\par \par Eye exam: Dec 2020-denies DR\par Foot exam: Dr. Guerrier office- +neuropathy\par Kidney disease: denies \par Heart disease: denies\par \par Weight: stable\par Diet:\par B: 2 fried eggs and ham, 1/2 cup coffee with milk \par L: whole wheat toast with pb\par D: shrimp,  cocktail sauce \par S: sugar free jello/pudding, cool whip\par Drinking: water \par Exercise: currently recovering from surgery, not cleared for exercise yet \par Smoking: denies\par \par HLD\par Triglyceride 175, Total cholesterol 142, HDL 34, LDL 73\par Atorvastatin 20 mg daily\par \par \par HTN\par BP in office 150/70\par Lisinopril-HCTZ l 10-12.5 mg otto\par \par Vit B12 - low- 218\par 1000 mcg daily supplement\par \par Vit D Def- low- 20.9\par 1000 IU daily supplement

## 2021-02-10 NOTE — ASSESSMENT
[FreeTextEntry1] : T2DM\par -Pt doing much better. We will continue to transition off insulin\par -For now, continue Basaglar 35 units QHS\par -Eliminate Humalog pre meal\par -Increase MFN to 1000 mg BID\par -Next medication we plan to add will be a GLP1\par -Continue to check FS 4x a day, send in logs bi weekly so we can continue to make medication changes \par -Continue to watch diet, pt doing very well with her diet\par -When cleared, increase exercise as tolerated, goal of 30 mins a day 5x a week\par -Continue to follow up with all specialists including ophtho, podiatry\par \par HTN\par -BP stable in office, continue aceI\par \par HLD\par -Continue statin\par \par RTO 6 weeks, labs before next apt

## 2021-02-10 NOTE — PHYSICAL EXAM
[Alert] : alert [Well Nourished] : well nourished [No Acute Distress] : no acute distress [Normal Sclera/Conjunctiva] : normal sclera/conjunctiva [Normal Hearing] : hearing was normal [Thyroid Not Enlarged] : the thyroid was not enlarged [No Accessory Muscle Use] : no accessory muscle use [Clear to Auscultation] : lungs were clear to auscultation bilaterally [Normal S1, S2] : normal S1 and S2 [Normal Rate] : heart rate was normal [No Edema] : no peripheral edema [Not Tender] : non-tender [Soft] : abdomen soft [No Rash] : no rash [No Tremors] : no tremors [Oriented x3] : oriented to person, place, and time [de-identified] : Uses walker, has an active bandage/wound on right foot

## 2021-02-10 NOTE — REVIEW OF SYSTEMS
[Fatigue] : no fatigue [Recent Weight Gain (___ Lbs)] : no recent weight gain [Recent Weight Loss (___ Lbs)] : no recent weight loss [Visual Field Defect] : no visual field defect [Blurred Vision] : no blurred vision [Chest Pain] : no chest pain [Shortness Of Breath] : no shortness of breath [Constipation] : no constipation [Diarrhea] : no diarrhea [Polyuria] : no polyuria [Polydipsia] : no polydipsia

## 2021-03-02 ENCOUNTER — NON-APPOINTMENT (OUTPATIENT)
Age: 56
End: 2021-03-02

## 2021-03-10 ENCOUNTER — NON-APPOINTMENT (OUTPATIENT)
Age: 56
End: 2021-03-10

## 2021-03-26 ENCOUNTER — LABORATORY RESULT (OUTPATIENT)
Age: 56
End: 2021-03-26

## 2021-03-31 ENCOUNTER — APPOINTMENT (OUTPATIENT)
Dept: ENDOCRINOLOGY | Facility: CLINIC | Age: 56
End: 2021-03-31
Payer: COMMERCIAL

## 2021-03-31 VITALS
DIASTOLIC BLOOD PRESSURE: 80 MMHG | WEIGHT: 216 LBS | SYSTOLIC BLOOD PRESSURE: 120 MMHG | HEIGHT: 67 IN | BODY MASS INDEX: 33.9 KG/M2

## 2021-03-31 PROCEDURE — 99072 ADDL SUPL MATRL&STAF TM PHE: CPT

## 2021-03-31 PROCEDURE — 99214 OFFICE O/P EST MOD 30 MIN: CPT

## 2021-03-31 RX ORDER — INSULIN ASPART 100 [IU]/ML
100 INJECTION, SOLUTION INTRAVENOUS; SUBCUTANEOUS
Qty: 1 | Refills: 0 | Status: DISCONTINUED | COMMUNITY
Start: 2020-10-13 | End: 2021-03-31

## 2021-03-31 NOTE — ASSESSMENT
[FreeTextEntry1] : T2DM\par -Pt doing much better. We will continue to transition off insulin\par -For now, continue Basaglar 28 units QHS and MFN to 1000 mg BID\par -Begin Ozempic 0.25 mg weekly for 4 weeks then increase to 0.5 mg weekly\par -Continue to check FS 4x a day, send in logs bi weekly so we can continue to make medication changes \par -Continue to watch diet, pt doing very well with her diet\par -When cleared, increase exercise as tolerated, goal of 30 mins a day 5x a week\par -Continue to follow up with all specialists including ophtho, podiatry\par \par HTN\par -BP stable in office, continue aceI\par \par HLD\par -Continue statin\par \par Continue Vitamin B and D supplements, levels WNL\par \par RTO 3 months, labs before next apt

## 2021-03-31 NOTE — REVIEW OF SYSTEMS
[Recent Weight Loss (___ Lbs)] : recent weight loss: [unfilled] lbs [Fatigue] : no fatigue [Decreased Appetite] : appetite not decreased [Recent Weight Gain (___ Lbs)] : no recent weight gain [Visual Field Defect] : no visual field defect [Blurred Vision] : no blurred vision [Chest Pain] : no chest pain [Shortness Of Breath] : no shortness of breath [Constipation] : no constipation [Diarrhea] : no diarrhea [Polyuria] : no polyuria [Polydipsia] : no polydipsia

## 2021-03-31 NOTE — PHYSICAL EXAM
[Alert] : alert [Well Nourished] : well nourished [No Acute Distress] : no acute distress [Normal Sclera/Conjunctiva] : normal sclera/conjunctiva [Normal Hearing] : hearing was normal [No Accessory Muscle Use] : no accessory muscle use [Thyroid Not Enlarged] : the thyroid was not enlarged [Clear to Auscultation] : lungs were clear to auscultation bilaterally [Normal S1, S2] : normal S1 and S2 [Normal Rate] : heart rate was normal [No Edema] : no peripheral edema [Not Tender] : non-tender [Soft] : abdomen soft [No Rash] : no rash [No Tremors] : no tremors [Oriented x3] : oriented to person, place, and time [de-identified] : Uses walker, has an active bandage/wound on right foot

## 2021-04-09 ENCOUNTER — NON-APPOINTMENT (OUTPATIENT)
Age: 56
End: 2021-04-09

## 2021-04-22 ENCOUNTER — NON-APPOINTMENT (OUTPATIENT)
Age: 56
End: 2021-04-22

## 2021-04-29 ENCOUNTER — NON-APPOINTMENT (OUTPATIENT)
Age: 56
End: 2021-04-29

## 2021-05-12 ENCOUNTER — NON-APPOINTMENT (OUTPATIENT)
Age: 56
End: 2021-05-12

## 2021-05-20 ENCOUNTER — NON-APPOINTMENT (OUTPATIENT)
Age: 56
End: 2021-05-20

## 2021-05-20 RX ORDER — SEMAGLUTIDE 1.34 MG/ML
2 INJECTION, SOLUTION SUBCUTANEOUS
Qty: 1 | Refills: 2 | Status: DISCONTINUED | COMMUNITY
Start: 2021-03-31 | End: 2021-05-20

## 2021-06-22 ENCOUNTER — RX RENEWAL (OUTPATIENT)
Age: 56
End: 2021-06-22

## 2021-06-25 ENCOUNTER — LABORATORY RESULT (OUTPATIENT)
Age: 56
End: 2021-06-25

## 2021-06-29 ENCOUNTER — APPOINTMENT (OUTPATIENT)
Dept: ENDOCRINOLOGY | Facility: CLINIC | Age: 56
End: 2021-06-29

## 2021-06-29 RX ORDER — INSULIN GLARGINE 100 [IU]/ML
100 INJECTION, SOLUTION SUBCUTANEOUS DAILY
Qty: 2 | Refills: 1 | Status: DISCONTINUED | COMMUNITY
Start: 2020-10-12 | End: 2021-06-29

## 2021-06-29 NOTE — PHYSICAL EXAM
[Alert] : alert [Well Nourished] : well nourished [No Acute Distress] : no acute distress [Normal Sclera/Conjunctiva] : normal sclera/conjunctiva [Normal Hearing] : hearing was normal [Thyroid Not Enlarged] : the thyroid was not enlarged [No Accessory Muscle Use] : no accessory muscle use [Clear to Auscultation] : lungs were clear to auscultation bilaterally [Normal S1, S2] : normal S1 and S2 [Normal Rate] : heart rate was normal [No Edema] : no peripheral edema [Not Tender] : non-tender [Soft] : abdomen soft [No Rash] : no rash [No Tremors] : no tremors [Oriented x3] : oriented to person, place, and time [de-identified] : Uses walker, has an active bandage/wound on right foot

## 2021-06-29 NOTE — HISTORY OF PRESENT ILLNESS
[FreeTextEntry1] : T2DM\par Severity: uncontrolled \par Onset: routine labs\par Duration: at least 10 years ago\par Modifying Factors: newly on insulin\par \par SMBG\par Checks BS 4x  a day\par All logs scanned in- on average 100s\par \par HGA1C 6.3\par \par Current drug regimen\par Januvia 50 mg daily\par MFN 1000 mg BID\par \par Has history of yeast infection\par \par Eye exam: Dec 2020-denies DR\par Foot exam: Dr. Guerrier office- +neuropathy\par Kidney disease: denies \par Heart disease: denies\par \par Weight: down 10 lbs since last visit \par Diet:\par B: 2 fried eggs and ham, 1/2 cup coffee with milk \par L: whole wheat toast with pb\par D: shrimp,  cocktail sauce \par S: sugar free jello/pudding, cool whip\par Drinking: water \par Exercise: goes for walks but needs orthotics \par Smoking: denies\par \par HLD\par Triglyceride 259, Total cholesterol 140, HDL 32, LDL 57\par Atorvastatin 20 mg daily\par \par HTN\par BP in office 120/80\par Lisinopril-HCTZ l 10-12.5 mg otto\par \par Vit B12 -381\par 1000 mcg daily supplement\par \par Vit D Def-36.5\par 1000 IU daily supplement

## 2021-06-30 ENCOUNTER — RX RENEWAL (OUTPATIENT)
Age: 56
End: 2021-06-30

## 2021-07-08 ENCOUNTER — RX RENEWAL (OUTPATIENT)
Age: 56
End: 2021-07-08

## 2021-08-16 ENCOUNTER — RX RENEWAL (OUTPATIENT)
Age: 56
End: 2021-08-16

## 2021-10-07 ENCOUNTER — LABORATORY RESULT (OUTPATIENT)
Age: 56
End: 2021-10-07

## 2021-11-03 ENCOUNTER — RX RENEWAL (OUTPATIENT)
Age: 56
End: 2021-11-03

## 2021-11-16 ENCOUNTER — APPOINTMENT (OUTPATIENT)
Dept: ENDOCRINOLOGY | Facility: CLINIC | Age: 56
End: 2021-11-16
Payer: COMMERCIAL

## 2021-11-16 ENCOUNTER — RESULT CHARGE (OUTPATIENT)
Age: 56
End: 2021-11-16

## 2021-11-16 VITALS — HEART RATE: 92 BPM | DIASTOLIC BLOOD PRESSURE: 78 MMHG | OXYGEN SATURATION: 99 % | SYSTOLIC BLOOD PRESSURE: 132 MMHG

## 2021-11-16 VITALS — BODY MASS INDEX: 33.74 KG/M2 | HEIGHT: 67 IN | WEIGHT: 215 LBS

## 2021-11-16 LAB — GLUCOSE BLDC GLUCOMTR-MCNC: 118

## 2021-11-16 PROCEDURE — 82962 GLUCOSE BLOOD TEST: CPT

## 2021-11-16 PROCEDURE — 99214 OFFICE O/P EST MOD 30 MIN: CPT | Mod: 25

## 2021-11-16 RX ORDER — GABAPENTIN 100 MG/1
100 CAPSULE ORAL
Qty: 90 | Refills: 0 | Status: DISCONTINUED | COMMUNITY
Start: 2021-06-09 | End: 2021-11-16

## 2021-11-16 NOTE — HISTORY OF PRESENT ILLNESS
[FreeTextEntry1] : Last seen 3/2021\par Sister  on pancreatic ca last month\par \par T2DM\par Severity: uncontrolled \par Onset: routine labs\par Duration: at least 10 years ago\par Modifying Factors: insulin in past on oral medication now\par \par \par SMBG\par Checks BS 2x a day\par On average fasting 140-160s \par Throughtout day before meals 100s\par After meal or before bed 150-160\par \par Current FS: 118 ate 1 hour \par \par HGA1C 6.9 (increased)\par \par Current drug regimen\par MFN 1000 mg BID\par Januvia 50 mg QD \par \par Has been off Basaglar since 2021\par \par Has history of yeast infection and could not tolerate Ozempic\par \par Eye exam: 2021 -denies DR\par Foot exam: Dr. Guerrier office ebery month- +neuropathy\par Kidney disease: denies \par Heart disease: denies\par \par Weight: stable \par Diet:\par B: 2 fried eggs and ham, 1/2 cup coffee with milk \par L: whole wheat toast with pb\par D: shrimp, cocktail sauce \par S: sugar free jello/pudding, cool whip\par Drinking: water \par Exercise: goes for walks but needs orthotics \par Smoking: denies\par \par HLD\par Triglyceride 161, Total cholesterol 155, HDL 40, LDL 83\par Atorvastatin 20 mg daily\par \par \par HTN\par BP in office 132/780\par Lisinopril-HCTZ l 10-12.5 mg otto\par \par Vit B12 - 866\par 1000 mcg daily supplement\par \par Vit D Def- 32.6 \par 1000 IU daily supplement \par

## 2021-11-16 NOTE — ASSESSMENT
[Diabetes Foot Care] : diabetes foot care [Long Term Vascular Complications] : long term vascular complications of diabetes [Carbohydrate Consistent Diet] : carbohydrate consistent diet [Importance of Diet and Exercise] : importance of diet and exercise to improve glycemic control, achieve weight loss and improve cardiovascular health [Exercise/Effect on Glucose] : exercise/effect on glucose [Retinopathy Screening] : Patient was referred to ophthalmology for retinopathy screening [FreeTextEntry1] : T2DM\par -Pt now doing worse off insulin\par -Pt hesistant to restart insulin \par -For now, continue MFN 1000 mg BId and Januvia 50 mg QD\par -Add Glipizide XL 5 mg QD\par -Continue to check FS 4x a day, send in logs bi weekly so we can continue to make medication changes \par -Continue to watch diet, and increase efforts, limiting carbs\par -Increase exercise as tolerated, goal of 30 mins a day 5x a week\par -Continue to follow up with all specialists including ophtho, podiatry\par \par HTN\par -BP stable in office, continue aceI\par \par HLD\par -Continue statin\par \par Continue Vitamin B \par Increase vit D 2000 units QD\par \par Fasting Labs before next appointment\par \par RTO in 8 weeks \par

## 2022-01-25 ENCOUNTER — LABORATORY RESULT (OUTPATIENT)
Age: 57
End: 2022-01-25

## 2022-02-02 ENCOUNTER — APPOINTMENT (OUTPATIENT)
Dept: ENDOCRINOLOGY | Facility: CLINIC | Age: 57
End: 2022-02-02
Payer: COMMERCIAL

## 2022-02-02 VITALS
WEIGHT: 215 LBS | HEIGHT: 67 IN | DIASTOLIC BLOOD PRESSURE: 80 MMHG | HEART RATE: 92 BPM | BODY MASS INDEX: 33.74 KG/M2 | OXYGEN SATURATION: 97 % | SYSTOLIC BLOOD PRESSURE: 122 MMHG

## 2022-02-02 LAB — GLUCOSE BLDC GLUCOMTR-MCNC: 123

## 2022-02-02 PROCEDURE — 82962 GLUCOSE BLOOD TEST: CPT

## 2022-02-02 PROCEDURE — 99214 OFFICE O/P EST MOD 30 MIN: CPT | Mod: 25

## 2022-02-02 RX ORDER — PEN NEEDLE, DIABETIC 32GX 5/32"
32G X 4 MM NEEDLE, DISPOSABLE MISCELLANEOUS
Qty: 4 | Refills: 1 | Status: DISCONTINUED | COMMUNITY
Start: 2020-10-12 | End: 2022-02-02

## 2022-02-02 NOTE — REVIEW OF SYSTEMS
[Fatigue] : fatigue [Recent Weight Gain (___ Lbs)] : no recent weight gain [Recent Weight Loss (___ Lbs)] : no recent weight loss [Chest Pain] : no chest pain [Shortness Of Breath] : no shortness of breath [Constipation] : no constipation [Diarrhea] : no diarrhea [Polyuria] : no polyuria [Polydipsia] : no polydipsia

## 2022-02-02 NOTE — HISTORY OF PRESENT ILLNESS
[FreeTextEntry1] : T2DM\par Severity: uncontrolled \par Onset: routine labs\par Duration: at least 10 years ago\par Modifying Factors: newly on insulin\par \par SMBG\par Has not been checking blood sugars\par FS in office 123- had healthy choice meal and pear \par \par HGA1C 7.3- worse\par \par Current drug regimen\par MFN 1000 mg BID\par Januvia 50 mg QD \par Glipizide 5 mg daily- has not been taking\par \par Has been off Basaglar since 5/2021\par \par Has history of yeast infection and could not tolerate Ozempic\par \par \par Eye exam: Dec 2020-denies DR\par Foot exam: Dr. Guerrier office- +neuropathy\par Kidney disease: denies \par Heart disease: denies\par \par Weight: stable\par Diet:\par B: 2 fried eggs and ham, 1/2 cup coffee with milk \par L: whole wheat toast with pb\par D: shrimp,  cocktail sauce \par S: sugar free jello/pudding, cool whip\par Drinking: water \par Exercise: was going for walks but now too cold- needs orthotics \par Smoking: denies\par \par HLD\par No updated lipid panel\par Atorvastatin 20 mg daily\par \par HTN\par BP in office 122/80\par Lisinopril-HCTZ l 10-12.5 mg otto\par \par Vit B12 -1963- too high now\par 3000 mcg daily supplement\par \par Vit D Def-30.0\par 1000 IU daily supplement

## 2022-02-02 NOTE — ASSESSMENT
[FreeTextEntry1] : T2DM\par -Pt  admits to a difficult winter with COVID, loss of her sister, holidays. HGA1C higher then goal.\par -Pt would like a chance to watch diet again and begin to check BS again before we make medication changes. \par -For now, continue MFN 1000 mg BID and Januvia 50 mg daily.\par -Increase exercise as tolerated, goal of 30 mins a day 5x a week\par -Continue to follow up with all specialists including ophtho, podiatry\par \par HTN\par -BP stable in office, continue aceI\par \par HLD\par -Continue statin\par \par Vit D Def\par -Continue daily 1000 IU supplement\par \par Vit B Def\par -Decrease 3000 mcg supplement to 1000 mcg daily\par \par RTO 3 months, labs before next apt

## 2022-02-02 NOTE — PHYSICAL EXAM
[Alert] : alert [Well Nourished] : well nourished [No Acute Distress] : no acute distress [Normal Sclera/Conjunctiva] : normal sclera/conjunctiva [Normal Hearing] : hearing was normal [Thyroid Not Enlarged] : the thyroid was not enlarged [No Accessory Muscle Use] : no accessory muscle use [Clear to Auscultation] : lungs were clear to auscultation bilaterally [Normal S1, S2] : normal S1 and S2 [Normal Rate] : heart rate was normal [No Edema] : no peripheral edema [Not Tender] : non-tender [Soft] : abdomen soft [No Rash] : no rash [No Tremors] : no tremors [Oriented x3] : oriented to person, place, and time [de-identified] : Uses walker, has an active bandage/wound on right foot

## 2022-05-10 ENCOUNTER — APPOINTMENT (OUTPATIENT)
Dept: ENDOCRINOLOGY | Facility: CLINIC | Age: 57
End: 2022-05-10

## 2022-05-11 NOTE — ED ADULT TRIAGE NOTE - SOURCE OF INFORMATION
Patient Tissue Cultured Epidermal Autograft Text: The defect edges were debeveled with a #15 scalpel blade.  Given the location of the defect, shape of the defect and the proximity to free margins a tissue cultured epidermal autograft was deemed most appropriate.  The graft was then trimmed to fit the size of the defect.  The graft was then placed in the primary defect and oriented appropriately.

## 2022-10-05 ENCOUNTER — RX RENEWAL (OUTPATIENT)
Age: 57
End: 2022-10-05

## 2022-11-01 LAB
25(OH)D3 SERPL-MCNC: 24.8 NG/ML
ALBUMIN SERPL ELPH-MCNC: 4.1 G/DL
ALP BLD-CCNC: 143 U/L
ALT SERPL-CCNC: 12 U/L
ANION GAP SERPL CALC-SCNC: 11 MMOL/L
AST SERPL-CCNC: 9 U/L
BASOPHILS # BLD AUTO: 0.07 K/UL
BASOPHILS NFR BLD AUTO: 0.7 %
BILIRUB SERPL-MCNC: 0.7 MG/DL
BUN SERPL-MCNC: 15 MG/DL
CALCIUM SERPL-MCNC: 9.4 MG/DL
CHLORIDE SERPL-SCNC: 103 MMOL/L
CHOLEST SERPL-MCNC: 163 MG/DL
CO2 SERPL-SCNC: 26 MMOL/L
CREAT SERPL-MCNC: 0.56 MG/DL
CREAT SPEC-SCNC: 53 MG/DL
EGFR: 105 ML/MIN/1.73M2
EOSINOPHIL # BLD AUTO: 0.14 K/UL
EOSINOPHIL NFR BLD AUTO: 1.3 %
GLUCOSE SERPL-MCNC: 268 MG/DL
HCT VFR BLD CALC: 44.8 %
HDLC SERPL-MCNC: 40 MG/DL
HGB BLD-MCNC: 14.8 G/DL
IMM GRANULOCYTES NFR BLD AUTO: 1.3 %
LDLC SERPL CALC-MCNC: 70 MG/DL
LYMPHOCYTES # BLD AUTO: 2.73 K/UL
LYMPHOCYTES NFR BLD AUTO: 25.9 %
MAN DIFF?: NORMAL
MCHC RBC-ENTMCNC: 31.6 PG
MCHC RBC-ENTMCNC: 33 GM/DL
MCV RBC AUTO: 95.7 FL
MICROALBUMIN 24H UR DL<=1MG/L-MCNC: 42.7 MG/DL
MICROALBUMIN/CREAT 24H UR-RTO: 799 MG/G
MONOCYTES # BLD AUTO: 0.54 K/UL
MONOCYTES NFR BLD AUTO: 5.1 %
NEUTROPHILS # BLD AUTO: 6.93 K/UL
NEUTROPHILS NFR BLD AUTO: 65.7 %
NONHDLC SERPL-MCNC: 123 MG/DL
PLATELET # BLD AUTO: 210 K/UL
POTASSIUM SERPL-SCNC: 4.6 MMOL/L
PROT SERPL-MCNC: 6.9 G/DL
RBC # BLD: 4.68 M/UL
RBC # FLD: 12.5 %
SODIUM SERPL-SCNC: 141 MMOL/L
T3FREE SERPL-MCNC: 2.51 PG/ML
T4 FREE SERPL-MCNC: 1.5 NG/DL
TRIGL SERPL-MCNC: 266 MG/DL
TSH SERPL-ACNC: 0.72 UIU/ML
VIT B12 SERPL-MCNC: 1001 PG/ML
WBC # FLD AUTO: 10.55 K/UL

## 2022-11-02 ENCOUNTER — APPOINTMENT (OUTPATIENT)
Dept: ENDOCRINOLOGY | Facility: CLINIC | Age: 57
End: 2022-11-02
Payer: COMMERCIAL

## 2022-11-02 ENCOUNTER — RESULT CHARGE (OUTPATIENT)
Age: 57
End: 2022-11-02

## 2022-11-02 VITALS
SYSTOLIC BLOOD PRESSURE: 134 MMHG | WEIGHT: 228 LBS | HEIGHT: 67 IN | DIASTOLIC BLOOD PRESSURE: 80 MMHG | HEART RATE: 99 BPM | BODY MASS INDEX: 35.79 KG/M2

## 2022-11-02 LAB
ESTIMATED AVERAGE GLUCOSE: 223 MG/DL
GLUCOSE BLDC GLUCOMTR-MCNC: 207
HBA1C MFR BLD HPLC: 9.4 %

## 2022-11-02 PROCEDURE — 99214 OFFICE O/P EST MOD 30 MIN: CPT | Mod: 25

## 2022-11-02 PROCEDURE — 82962 GLUCOSE BLOOD TEST: CPT

## 2022-11-02 NOTE — REVIEW OF SYSTEMS
[Recent Weight Gain (___ Lbs)] : recent weight gain: [unfilled] lbs [Constipation] : constipation [Diarrhea] : diarrhea [Fatigue] : no fatigue [Recent Weight Loss (___ Lbs)] : no recent weight loss [Visual Field Defect] : no visual field defect [Blurred Vision] : no blurred vision [Dysphagia] : no dysphagia [Neck Pain] : no neck pain [Dysphonia] : no dysphonia [Chest Pain] : no chest pain [Shortness Of Breath] : no shortness of breath [Polyuria] : no polyuria [Polydipsia] : no polydipsia [FreeTextEntry8] : +yeast infection

## 2022-11-02 NOTE — PHYSICAL EXAM
[Alert] : alert [Well Nourished] : well nourished [No Acute Distress] : no acute distress [Normal Sclera/Conjunctiva] : normal sclera/conjunctiva [Normal Hearing] : hearing was normal [Thyroid Not Enlarged] : the thyroid was not enlarged [No Accessory Muscle Use] : no accessory muscle use [Clear to Auscultation] : lungs were clear to auscultation bilaterally [Normal S1, S2] : normal S1 and S2 [Normal Rate] : heart rate was normal [No Edema] : no peripheral edema [Not Tender] : non-tender [Soft] : abdomen soft [No Rash] : no rash [No Tremors] : no tremors [Oriented x3] : oriented to person, place, and time [de-identified] : Uses walker, has an active bandage/wound on right foot

## 2022-11-02 NOTE — ASSESSMENT
[FreeTextEntry1] : T2DM\par -Pt  admits that she fell off her tight diabetes control but she knows she can do it. \par -Pt would like a chance to watch diet again and begin to check BS again twice daily.\par -For now, continue MFN 1000 mg BID (be compliant with both doses) and Januvia 50 mg daily.\par -Begin Glipizide 5 mg daily (pt will take on her lunch break when she eats)\par -Pt has history of and currently has a yeast infection- diflucan 2 tabs sent \par -Pt not interested in CDE, states she knows what she has to do and she just has to do it\par -Increase exercise as tolerated, goal of 30 mins a day 5x a week\par -Continue to follow up with all specialists including ophtho, podiatry\par \par HTN\par -BP stable in office, continue aceI\par \par HLD\par -Continue statin, triglycerides will likely improve with diet changes \par \par Vit D Def\par -Double daily 1000 IU supplement to 2000 IU daily \par \par Vit B Def\par -Continue daily supplement\par \par Will check in with pt in 6 weeks via telephone as she needs an accountability partner for her DM\par RTO 3 months, labs before next apt

## 2022-11-02 NOTE — HISTORY OF PRESENT ILLNESS
[FreeTextEntry1] : Pt lost to follow up since 9/2022 and now DM is uncontrolled\par Pt endorses a lot of stress and that she can go all day without eating then binges at home late night \par \par T2DM\par Severity: uncontrolled \par Onset: routine labs\par Duration: at least 10 years ago\par Modifying Factors: newly on insulin\par \par SMBG\par Has been checking once a day- 2 hour post meal high 100s \par FS in office 207- [place] \par \par HGA1C 9.4- worse\par \par Current drug regimen\par MFN 1000 mg BID (most days on 1000 mg ) \par Januvia 50 mg QD \par \par Has been off Basaglar since 5/2021\par \par Has history of yeast infection and could not tolerate Ozempic\par \par Eye exam: Dec 2021-denies DR\par Foot exam: Dr. Guerrier office, goes every 10-12 weeks- +neuropathy\par Kidney disease: denies \par Heart disease: denies\par \par Weight:has gained 13 lbs 2/2022\par Diet:\par B: 2 fried eggs and ham, 1/2 cup coffee with milk \par L: whole wheat toast with pb\par D: shrimp,  cocktail sauce \par S: sugar free jello/pudding, cool whip\par Drinking: water \par Exercise: was going for walks but now having difficulty with balance  \par Smoking: denies\par \par HLD\par Triglyceride 266, Total cholesterol 163, HDL 40, LDL 70 \par Atorvastatin 20 mg daily\par \par HTN\par BP in office 134/80\par Lisinopril-HCTZ l 10-12.5 mg otto\par \par Vit B12 -1001- \par 3000 mcg daily supplement\par \par Vit D Def-24.8\par 1000 IU daily supplement

## 2022-12-14 ENCOUNTER — TRANSCRIPTION ENCOUNTER (OUTPATIENT)
Age: 57
End: 2022-12-14

## 2023-01-16 ENCOUNTER — RX RENEWAL (OUTPATIENT)
Age: 58
End: 2023-01-16

## 2023-01-22 DIAGNOSIS — M25.561 PAIN IN RIGHT KNEE: ICD-10-CM

## 2023-01-23 ENCOUNTER — APPOINTMENT (OUTPATIENT)
Dept: ORTHOPEDIC SURGERY | Facility: CLINIC | Age: 58
End: 2023-01-23
Payer: OTHER MISCELLANEOUS

## 2023-01-23 VITALS
HEIGHT: 67 IN | SYSTOLIC BLOOD PRESSURE: 178 MMHG | DIASTOLIC BLOOD PRESSURE: 97 MMHG | BODY MASS INDEX: 35.79 KG/M2 | WEIGHT: 228 LBS

## 2023-01-23 DIAGNOSIS — M17.11 UNILATERAL PRIMARY OSTEOARTHRITIS, RIGHT KNEE: ICD-10-CM

## 2023-01-23 PROCEDURE — 99455 WORK RELATED DISABILITY EXAM: CPT

## 2023-01-23 PROCEDURE — 73564 X-RAY EXAM KNEE 4 OR MORE: CPT | Mod: LT

## 2023-01-23 PROCEDURE — 99072 ADDL SUPL MATRL&STAF TM PHE: CPT

## 2023-01-23 RX ORDER — MELOXICAM 15 MG/1
15 TABLET ORAL
Qty: 30 | Refills: 0 | Status: ACTIVE | COMMUNITY
Start: 2023-01-23 | End: 1900-01-01

## 2023-01-23 NOTE — REASON FOR VISIT
[Initial Visit] : an initial visit for [Workers' Comp: Date of Injury: _____] : This visit is related to worker's compensation. Date of Injury: [unfilled] [Other: ____] : [unfilled]

## 2023-01-23 NOTE — HISTORY OF PRESENT ILLNESS
[FreeTextEntry1] : Patient is a 57-year-old female here today for evaluation of right knee pain after Worker's Comp. injury that occurred approximately 2 weeks ago.  She states she was slip and fall on the carpet and landed onto her right knee.  Was told at an urgent care that she may have a patella fracture.  Was placed in a brace.  Followed up with another orthopedist who told her that she may weight-bear as well as bend the knee.  States that she is still having pain in the knee.  Has not been wearing a brace.  Has been ambulating with a walker due to the severe pain in the knee.  States it is severely tender over the tibial tubercle.  Has some swelling.  Has not been taking any pain medication.  Not been in physical therapy.  Denies any neurovascular compromise or hip pain

## 2023-01-23 NOTE — PHYSICAL EXAM
[de-identified] : GENERAL APPEARANCE: Well nourished and hydrated, pleasant, alert, and oriented x 3. Appears their stated age. \par HEENT: Normocephalic, extraocular eye motion intact. Nasal septum midline. Oral cavity clear. External auditory canal clear. \par RESPIRATORY: Breath sounds clear and audible in all lobes. No wheezing, No accessory muscle use.\par CARDIOVASCULAR: No apparent abnormalities. No lower leg edema. No varicosities. Pedal pulses are palpable.\par NEUROLOGIC: Sensation is normal, no muscle weakness in the upper or lower extremities.\par DERMATOLOGIC: No apparent skin lesions, moist, warm, no rash.\par SPINE: Cervical spine appears normal and moves freely; thoracic spine appears normal and moves freely; lumbosacral spine appears normal and moves freely, normal, nontender.\par MUSCULOSKELETAL: Hands, wrists, and elbows are normal and move freely, shoulders are normal and move freely. \par Psychiatric: Oriented to person, place, and time, insight and judgement were intact and the affect was normal. \par Musculoskeletal:. Right knee exam shows mild effusion, ROM is 0-1 20 degrees, no instability, no pain with Edna, no joint line tenderness.  There is tenderness palpation over the tibial tubercle.  Full extensor mechanism strength against resistance\par 5/5 motor strength in bilateral lower extremities. Sensory: Intact in bilateral lower extremities. DTRs: Biceps, brachioradialis, triceps, patellar, ankle and plantar 2+ and symmetric bilaterally. Pulses: dorsalis pedis, posterior tibial, femoral, popliteal, and radial 2+ and symmetric bilaterally. \par Constitutional: Alert and in no acute distress, but well-appearing. \par  [de-identified] : 4 views left knee obtained the office today show no acute fracture or dislocation.  There is severe medial joint space narrowing bone-on-bone osteoarthritis tricompartmental degenerative changes consistent with Kellgren-Ar grade 4 changes.

## 2023-01-23 NOTE — DISCUSSION/SUMMARY
[Medication Risks Reviewed] : Medication risks reviewed [Surgical risks reviewed] : Surgical risks reviewed [de-identified] : Patient is a 57-year-old female with severe right knee osteoarthritis presenting today for initial evaluation.  While she does have significant knee arthritis on her x-ray she is not having much symptoms from this at this time.  She is exquisitely tender over her tibial tubercle as well as have ecchymosis and edema.  Due to the fact that she is having such severe pain is been going on for over 2 weeks I recommended an MRI of her right knee for further evaluation management or to rule out any occult fracture or patella tendon injury.  I recommended physical therapy and given her prescription for that.  She may weight-bear as tolerated with use of a walker.  She should be out of work at this time due to the severe pain she is experiencing.  I will see her back after the MRI for repeat evaluation management.  All questions were asked and answered.

## 2023-02-06 ENCOUNTER — APPOINTMENT (OUTPATIENT)
Dept: ORTHOPEDIC SURGERY | Facility: CLINIC | Age: 58
End: 2023-02-06
Payer: OTHER MISCELLANEOUS

## 2023-02-06 PROCEDURE — 99441: CPT

## 2023-02-07 ENCOUNTER — APPOINTMENT (OUTPATIENT)
Dept: ORTHOPEDIC SURGERY | Facility: CLINIC | Age: 58
End: 2023-02-07
Payer: OTHER MISCELLANEOUS

## 2023-02-07 VITALS
BODY MASS INDEX: 35.79 KG/M2 | HEIGHT: 67 IN | DIASTOLIC BLOOD PRESSURE: 99 MMHG | HEART RATE: 116 BPM | SYSTOLIC BLOOD PRESSURE: 176 MMHG | WEIGHT: 228 LBS

## 2023-02-07 DIAGNOSIS — S82.109A UNSPECIFIED FRACTURE OF UPPER END OF UNSPECIFIED TIBIA, INITIAL ENCOUNTER FOR CLOSED FRACTURE: ICD-10-CM

## 2023-02-07 PROCEDURE — 99455 WORK RELATED DISABILITY EXAM: CPT | Mod: 57

## 2023-02-07 PROCEDURE — 27530 TREAT KNEE FRACTURE: CPT | Mod: RT

## 2023-02-07 PROCEDURE — 99072 ADDL SUPL MATRL&STAF TM PHE: CPT

## 2023-02-07 PROCEDURE — 73564 X-RAY EXAM KNEE 4 OR MORE: CPT | Mod: RT

## 2023-02-07 NOTE — PHYSICAL EXAM
[de-identified] : Musculoskeletal:. Right knee exam shows mild effusion, ROM is 0-1 20 degrees, no instability, no pain with Edna, no joint line tenderness.  There is tenderness palpation over the tibial tubercle and proximal tibia.  Full extensor mechanism strength against resistance\par 5/5 motor strength in bilateral lower extremities. Sensory: Intact in bilateral lower extremities. DTRs: Biceps, brachioradialis, triceps, patellar, ankle and plantar 2+ and symmetric bilaterally. Pulses: dorsalis pedis, posterior tibial, femoral, popliteal, and radial 2+ and symmetric bilaterally. \par Constitutional: Alert and in no acute distress, but well-appearing. \par  [de-identified] : 4 views of the right knee obtained the office today showed no acute fracture or dislocation.  Severe medial joint space narrowing osteophyte formation consistent with Kellgren-Ar grade 4 changes\par \par MRI of the right knee dated 1/30/2023 shows subacute proximal tibia fracture with tricompartmental osteoarthritis.

## 2023-02-07 NOTE — DISCUSSION/SUMMARY
[Medication Risks Reviewed] : Medication risks reviewed [Surgical risks reviewed] : Surgical risks reviewed [de-identified] : Patient is a 57-year-old female with a right proximal tibia fracture presenting today for follow-up.  She has no evidence of displacement of her fracture on x-ray however she is having exquisite pain in her right leg.  Her MRI does show a proximal tibia fracture.  I have therefore recommended that she be nonweightbearing in her right lower extremity.  I given her a prescription for hinged knee brace that may be unlocked to work on range of motion with physical therapy.  She will continue with her crutches.  I will see her back in 1 month for repeat evaluation possible advancement of weightbearing status.  All questions were asked and answered.

## 2023-02-07 NOTE — HISTORY OF PRESENT ILLNESS
[FreeTextEntry1] : Patient is a 57-year-old female here today for follow-up of her right knee pain.  She has recently obtained an MRI of her right knee that showed a proximal tibia fracture.  She has now been nonweightbearing on crutches and a walker after this was found on her MRI.  She states that the pain has been slowly improving in her knee however it still continues to bother her.  Hurts over the proximal aspect of her leg.  States that the swelling has been improving.  Has been taking Tylenol for the pain.  Denies any new trauma or falls.  Denies any neurovascular compromise

## 2023-02-08 ENCOUNTER — RX RENEWAL (OUTPATIENT)
Age: 58
End: 2023-02-08

## 2023-02-27 ENCOUNTER — APPOINTMENT (OUTPATIENT)
Dept: ORTHOPEDIC SURGERY | Facility: CLINIC | Age: 58
End: 2023-02-27

## 2023-03-14 ENCOUNTER — APPOINTMENT (OUTPATIENT)
Dept: ENDOCRINOLOGY | Facility: CLINIC | Age: 58
End: 2023-03-14

## 2023-04-05 ENCOUNTER — RX RENEWAL (OUTPATIENT)
Age: 58
End: 2023-04-05

## 2023-05-12 ENCOUNTER — RX RENEWAL (OUTPATIENT)
Age: 58
End: 2023-05-12

## 2023-05-15 ENCOUNTER — RX RENEWAL (OUTPATIENT)
Age: 58
End: 2023-05-15

## 2023-06-09 ENCOUNTER — NON-APPOINTMENT (OUTPATIENT)
Age: 58
End: 2023-06-09

## 2023-06-10 ENCOUNTER — EMERGENCY (EMERGENCY)
Facility: HOSPITAL | Age: 58
LOS: 1 days | Discharge: HOME | End: 2023-06-10
Attending: EMERGENCY MEDICINE
Payer: COMMERCIAL

## 2023-06-10 VITALS
OXYGEN SATURATION: 98 % | HEART RATE: 114 BPM | WEIGHT: 195.11 LBS | SYSTOLIC BLOOD PRESSURE: 141 MMHG | RESPIRATION RATE: 22 BRPM | DIASTOLIC BLOOD PRESSURE: 84 MMHG | TEMPERATURE: 98 F

## 2023-06-10 VITALS
OXYGEN SATURATION: 98 % | DIASTOLIC BLOOD PRESSURE: 82 MMHG | SYSTOLIC BLOOD PRESSURE: 140 MMHG | RESPIRATION RATE: 18 BRPM | HEART RATE: 82 BPM

## 2023-06-10 DIAGNOSIS — Z90.49 ACQUIRED ABSENCE OF OTHER SPECIFIED PARTS OF DIGESTIVE TRACT: Chronic | ICD-10-CM

## 2023-06-10 DIAGNOSIS — Z98.890 OTHER SPECIFIED POSTPROCEDURAL STATES: Chronic | ICD-10-CM

## 2023-06-10 LAB
ALBUMIN SERPL ELPH-MCNC: 4 G/DL — SIGNIFICANT CHANGE UP (ref 3.3–5.2)
ALP SERPL-CCNC: 105 U/L — SIGNIFICANT CHANGE UP (ref 40–120)
ALT FLD-CCNC: 11 U/L — SIGNIFICANT CHANGE UP
ANION GAP SERPL CALC-SCNC: 19 MMOL/L — HIGH (ref 5–17)
AST SERPL-CCNC: 23 U/L — SIGNIFICANT CHANGE UP
BASOPHILS # BLD AUTO: 0.07 K/UL — SIGNIFICANT CHANGE UP (ref 0–0.2)
BASOPHILS NFR BLD AUTO: 0.5 % — SIGNIFICANT CHANGE UP (ref 0–2)
BILIRUB SERPL-MCNC: 0.5 MG/DL — SIGNIFICANT CHANGE UP (ref 0.4–2)
BUN SERPL-MCNC: 32.4 MG/DL — HIGH (ref 8–20)
CALCIUM SERPL-MCNC: 9.2 MG/DL — SIGNIFICANT CHANGE UP (ref 8.4–10.5)
CHLORIDE SERPL-SCNC: 102 MMOL/L — SIGNIFICANT CHANGE UP (ref 96–108)
CO2 SERPL-SCNC: 18 MMOL/L — LOW (ref 22–29)
CREAT SERPL-MCNC: 1.06 MG/DL — SIGNIFICANT CHANGE UP (ref 0.5–1.3)
EGFR: 61 ML/MIN/1.73M2 — SIGNIFICANT CHANGE UP
EOSINOPHIL # BLD AUTO: 0.12 K/UL — SIGNIFICANT CHANGE UP (ref 0–0.5)
EOSINOPHIL NFR BLD AUTO: 0.9 % — SIGNIFICANT CHANGE UP (ref 0–6)
GLUCOSE SERPL-MCNC: 156 MG/DL — HIGH (ref 70–99)
HCT VFR BLD CALC: 43.9 % — SIGNIFICANT CHANGE UP (ref 34.5–45)
HGB BLD-MCNC: 14.9 G/DL — SIGNIFICANT CHANGE UP (ref 11.5–15.5)
IMM GRANULOCYTES NFR BLD AUTO: 1.2 % — HIGH (ref 0–0.9)
LIDOCAIN IGE QN: 37 U/L — SIGNIFICANT CHANGE UP (ref 22–51)
LYMPHOCYTES # BLD AUTO: 23.2 % — SIGNIFICANT CHANGE UP (ref 13–44)
LYMPHOCYTES # BLD AUTO: 3.02 K/UL — SIGNIFICANT CHANGE UP (ref 1–3.3)
MCHC RBC-ENTMCNC: 30.8 PG — SIGNIFICANT CHANGE UP (ref 27–34)
MCHC RBC-ENTMCNC: 33.9 GM/DL — SIGNIFICANT CHANGE UP (ref 32–36)
MCV RBC AUTO: 90.7 FL — SIGNIFICANT CHANGE UP (ref 80–100)
MONOCYTES # BLD AUTO: 0.76 K/UL — SIGNIFICANT CHANGE UP (ref 0–0.9)
MONOCYTES NFR BLD AUTO: 5.8 % — SIGNIFICANT CHANGE UP (ref 2–14)
NEUTROPHILS # BLD AUTO: 8.91 K/UL — HIGH (ref 1.8–7.4)
NEUTROPHILS NFR BLD AUTO: 68.4 % — SIGNIFICANT CHANGE UP (ref 43–77)
PLATELET # BLD AUTO: 248 K/UL — SIGNIFICANT CHANGE UP (ref 150–400)
POTASSIUM SERPL-MCNC: 5.2 MMOL/L — SIGNIFICANT CHANGE UP (ref 3.5–5.3)
POTASSIUM SERPL-SCNC: 5.2 MMOL/L — SIGNIFICANT CHANGE UP (ref 3.5–5.3)
PROT SERPL-MCNC: 7.5 G/DL — SIGNIFICANT CHANGE UP (ref 6.6–8.7)
RBC # BLD: 4.84 M/UL — SIGNIFICANT CHANGE UP (ref 3.8–5.2)
RBC # FLD: 12.9 % — SIGNIFICANT CHANGE UP (ref 10.3–14.5)
SODIUM SERPL-SCNC: 139 MMOL/L — SIGNIFICANT CHANGE UP (ref 135–145)
TROPONIN T SERPL-MCNC: <0.01 NG/ML — SIGNIFICANT CHANGE UP (ref 0–0.06)
WBC # BLD: 13.04 K/UL — HIGH (ref 3.8–10.5)
WBC # FLD AUTO: 13.04 K/UL — HIGH (ref 3.8–10.5)

## 2023-06-10 PROCEDURE — 85025 COMPLETE CBC W/AUTO DIFF WBC: CPT

## 2023-06-10 PROCEDURE — 71045 X-RAY EXAM CHEST 1 VIEW: CPT | Mod: 26

## 2023-06-10 PROCEDURE — 99285 EMERGENCY DEPT VISIT HI MDM: CPT

## 2023-06-10 PROCEDURE — 93005 ELECTROCARDIOGRAM TRACING: CPT

## 2023-06-10 PROCEDURE — 96375 TX/PRO/DX INJ NEW DRUG ADDON: CPT

## 2023-06-10 PROCEDURE — 71045 X-RAY EXAM CHEST 1 VIEW: CPT

## 2023-06-10 PROCEDURE — 36415 COLL VENOUS BLD VENIPUNCTURE: CPT

## 2023-06-10 PROCEDURE — 84484 ASSAY OF TROPONIN QUANT: CPT

## 2023-06-10 PROCEDURE — 83690 ASSAY OF LIPASE: CPT

## 2023-06-10 PROCEDURE — 96374 THER/PROPH/DIAG INJ IV PUSH: CPT

## 2023-06-10 PROCEDURE — 93010 ELECTROCARDIOGRAM REPORT: CPT

## 2023-06-10 PROCEDURE — 80053 COMPREHEN METABOLIC PANEL: CPT

## 2023-06-10 PROCEDURE — 99285 EMERGENCY DEPT VISIT HI MDM: CPT | Mod: 25

## 2023-06-10 RX ORDER — ONDANSETRON 8 MG/1
4 TABLET, FILM COATED ORAL ONCE
Refills: 0 | Status: COMPLETED | OUTPATIENT
Start: 2023-06-10 | End: 2023-06-10

## 2023-06-10 RX ORDER — SODIUM CHLORIDE 9 MG/ML
1000 INJECTION INTRAMUSCULAR; INTRAVENOUS; SUBCUTANEOUS ONCE
Refills: 0 | Status: COMPLETED | OUTPATIENT
Start: 2023-06-10 | End: 2023-06-10

## 2023-06-10 RX ORDER — PANTOPRAZOLE SODIUM 20 MG/1
40 TABLET, DELAYED RELEASE ORAL ONCE
Refills: 0 | Status: COMPLETED | OUTPATIENT
Start: 2023-06-10 | End: 2023-06-10

## 2023-06-10 RX ORDER — ONDANSETRON 8 MG/1
1 TABLET, FILM COATED ORAL
Qty: 10 | Refills: 0
Start: 2023-06-10 | End: 2023-06-14

## 2023-06-10 RX ADMIN — SODIUM CHLORIDE 1000 MILLILITER(S): 9 INJECTION INTRAMUSCULAR; INTRAVENOUS; SUBCUTANEOUS at 16:47

## 2023-06-10 RX ADMIN — ONDANSETRON 4 MILLIGRAM(S): 8 TABLET, FILM COATED ORAL at 18:22

## 2023-06-10 RX ADMIN — PANTOPRAZOLE SODIUM 40 MILLIGRAM(S): 20 TABLET, DELAYED RELEASE ORAL at 16:50

## 2023-06-10 NOTE — ED PROVIDER NOTE - PATIENT PORTAL LINK FT
You can access the FollowMyHealth Patient Portal offered by [facility] by registering at the following website: [email or web address]. By joining [employer] [facility]’s FollowMyHealth portal, you will also be able to view your health information using other applications (apps) compatible with our system.

## 2023-06-10 NOTE — ED PROVIDER NOTE - CARE PROVIDER_API CALL
Steve Campuzano  Gastroenterology  [address]  [place] [address]  Phone: [phone]  Fax: [phone]  Follow Up Time: 1-3 Days

## 2023-06-10 NOTE — ED PROVIDER NOTE - PRO INTERPRETER NEED 2
Goal Outcome Evaluation:  Plan of Care Reviewed With: patient        Progress: no change  Outcome Summary: Pt medicated with IV and PO pain medicine for c/o L AKA pain. L AKA d/i with ace bandage. IID. Anuric. Small open area to buttock; mepilex d/i. Turn q2 hours. Worked today with PT and sat on the side of the bed. R permacath d/i. VSS.   English

## 2023-06-10 NOTE — ED PROVIDER NOTE - PROGRESS NOTE DETAILS
JK - labs WNL. Patient reports improvement in her symptoms. Tolerating PO, well appearing. Ready for DC with return precautions, zofran and prednisone scripts, close GI follow up.

## 2023-06-10 NOTE — ED PROVIDER NOTE - CLINICAL SUMMARY MEDICAL DECISION MAKING FREE TEXT BOX
Patient is a 58 yo female with PMHx DM2 on metformin, HTN, IBS, ventral hernia repair s/p colectomy s/p anastomosis presenting with n/v/d as well as abnormal ekg at her urgent care. Tachycardic, vss otherwise.      Will check labs, r/o electrolyte abnormalities, r/o pancreatitis, evaluate for acs with troponin ekg, cxr to r/o pna, hydrate, treat nausea, reassess.

## 2023-06-10 NOTE — ED PROVIDER NOTE - ATTENDING CONTRIBUTION TO CARE
I personally saw the patient with the resident, and completed the key components of the history and physical exam. I then discussed the management plan with the resident.    58 y/o F with PMH DM2 on metformin, HTN, IBS presents 3 days of nausea, vomiting, diarrhea and epigastric discomfort, unable to tolerate anything PO since then. She went to urgent care who noted an abnormal EKG showing sinus tach and baseline artifact from poor lead placement and patient was sent to ED. She denies fevers, cough, congestion, sore throat.    I agree with exam as documented.    EKG sinus tach (has been in sinus tach  before), no ischemia, abd soft NT/ND, vitals otherwise WNL - will check labs, IV fluids, protonix, zofran and reassess - PO challenge.

## 2023-06-10 NOTE — ED PROVIDER NOTE - OBJECTIVE STATEMENT
Patient is a 58 yo female with PMHx DM2 on metformin, HTN, IBS, ventral hernia repair s/p colectomy s/p anastomosis presenting with n/v/d as well as abnormal ekg at her urgent care. Patient is a 56 yo female with PMHx DM2 on metformin, HTN, IBS, ventral hernia repair s/p colectomy s/p anastomosis presenting with n/v/d as well as abnormal ekg at her urgent care. As per patient and  at bedside, patient has IBS and intermittently has n/v/d however has had nonbilious nonbloody vomiting as well as nonbloody diarrhea since Monday; patient went to urgent care where EKG showed sinus tachycardia and was sent in for evaluation. Denies CP, SOB, peripheral edema, recent travel, syncope, abdominal pain. Denies fevers, chills, dizziness, lightheadedness, dysphagia, dysarthria, diplopia, photophobia, syncope, cough, congestion, SOB, CP, abdominal pain, neck pain, back pain, dysuria, hematuria, hematochezia, hematemesis, recent travel, sick contacts, leg swelling.

## 2023-06-21 ENCOUNTER — RX RENEWAL (OUTPATIENT)
Age: 58
End: 2023-06-21

## 2023-06-23 ENCOUNTER — LABORATORY RESULT (OUTPATIENT)
Age: 58
End: 2023-06-23

## 2023-06-26 ENCOUNTER — APPOINTMENT (OUTPATIENT)
Dept: ENDOCRINOLOGY | Facility: CLINIC | Age: 58
End: 2023-06-26
Payer: COMMERCIAL

## 2023-06-26 VITALS
BODY MASS INDEX: 35.31 KG/M2 | WEIGHT: 225 LBS | OXYGEN SATURATION: 96 % | HEIGHT: 67 IN | SYSTOLIC BLOOD PRESSURE: 128 MMHG | HEART RATE: 90 BPM | DIASTOLIC BLOOD PRESSURE: 80 MMHG

## 2023-06-26 LAB — GLUCOSE BLDC GLUCOMTR-MCNC: 167

## 2023-06-26 PROCEDURE — 82962 GLUCOSE BLOOD TEST: CPT

## 2023-06-26 PROCEDURE — 99214 OFFICE O/P EST MOD 30 MIN: CPT | Mod: 25

## 2023-06-26 RX ORDER — ONDANSETRON 4 MG/1
4 TABLET, ORALLY DISINTEGRATING ORAL
Qty: 10 | Refills: 0 | Status: ACTIVE | COMMUNITY
Start: 2023-06-10

## 2023-06-26 RX ORDER — METOPROLOL SUCCINATE 50 MG/1
50 TABLET, EXTENDED RELEASE ORAL
Qty: 30 | Refills: 0 | Status: ACTIVE | COMMUNITY
Start: 2023-06-15

## 2023-06-26 RX ORDER — PREDNISONE 50 MG/1
50 TABLET ORAL
Qty: 4 | Refills: 0 | Status: DISCONTINUED | COMMUNITY
Start: 2023-06-10 | End: 2023-06-26

## 2023-06-26 RX ORDER — GLIPIZIDE 5 MG/1
5 TABLET, FILM COATED, EXTENDED RELEASE ORAL DAILY
Qty: 90 | Refills: 0 | Status: DISCONTINUED | COMMUNITY
Start: 2022-11-02 | End: 2023-06-26

## 2023-06-26 NOTE — ASSESSMENT
[FreeTextEntry1] : T2DM\par -Pt  admits that she fell off her tight diabetes control , has been having several oreos a day\par -Pt scared now because her vision is blurry- maybe due to a rapid change in blood sugars? Ophtho ruled out any ophtho emergencies. \par -For now, continue MFN 1000 mg BID (be compliant with both doses) and Januvia 50 mg daily.\par -Begin Glipizide 2. 5 mg daily with dinner\par -Pt not interested in CDE, states she knows what she has to do and she just has to do it\par -Increase exercise as tolerated, goal of 30 mins a day 5x a week\par -Continue to follow up with all specialists including ophtho, podiatry\par \par HTN\par -BP stable in office, continue aceI\par \par HLD\par -Continue statin\par Vit D Def\par -Restart daily 1000 IU supplement \par \par Vit B Def\par -Ok to continue on no supplement \par \par Will check in with pt in 2 weeks- she needs to send logs \par RTO 3 months, labs before next apt

## 2023-06-26 NOTE — REVIEW OF SYSTEMS
[Blurred Vision] : blurred vision [Fatigue] : no fatigue [Recent Weight Gain (___ Lbs)] : no recent weight gain [Recent Weight Loss (___ Lbs)] : no recent weight loss [Dysphagia] : no dysphagia [Neck Pain] : no neck pain [Dysphonia] : no dysphonia [Chest Pain] : no chest pain [Shortness Of Breath] : no shortness of breath [Constipation] : no constipation [Diarrhea] : no diarrhea [Polyuria] : no polyuria [Polydipsia] : no polydipsia [FreeTextEntry9] : walks with walker

## 2023-06-26 NOTE — PHYSICAL EXAM
[Alert] : alert [Well Nourished] : well nourished [No Acute Distress] : no acute distress [Normal Sclera/Conjunctiva] : normal sclera/conjunctiva [Normal Hearing] : hearing was normal [Thyroid Not Enlarged] : the thyroid was not enlarged [No Accessory Muscle Use] : no accessory muscle use [Clear to Auscultation] : lungs were clear to auscultation bilaterally [Normal S1, S2] : normal S1 and S2 [Normal Rate] : heart rate was normal [No Edema] : no peripheral edema [Not Tender] : non-tender [Soft] : abdomen soft [No Rash] : no rash [No Tremors] : no tremors [Oriented x3] : oriented to person, place, and time [de-identified] : Uses walker, has an active bandage/wound on right foot

## 2023-06-26 NOTE — HISTORY OF PRESENT ILLNESS
[FreeTextEntry1] : Interval history: Pt had a " viral infection" that caused nausea, dizziness, and blurry vision? Never took the prescribed steroids and is feeling better but blurry vision maintains (ophtho states vision is fine). Could be because blood sugars are poorly controlled.  \par \par Pt lost to follow up since 11/2022 and now DM is uncontrolled\par \par Pt endorses a lot of stress and that she can go all day without eating then binges at home late night \par \par T2DM\par Severity: uncontrolled \par Onset: routine labs\par Duration: at least 10 years ago\par Modifying Factors: newly on insulin\par \par SMBG\par Has been checking once a day- was often high 100s, last 2 days low 100s\par 2 hour post meal high 100s , low 200s \par FS in office 167- peanut butter jelly on white bread and a coffee \par \par HGA1C 10.7- worse\par \par Current drug regimen\par MFN 1000 mg BID (most days on 1000 mg but for past few days has gotten the 2nd dose in ) \par Januvia 50 mg QD \par \par Never started glipizide \par Has been off Basaglar since 5/2021\par \par Has history of yeast infection and could not tolerate Ozempic\par \par Eye exam: Dec 2021-denies \par Foot exam: Dr. Guerrier office, goes every 10-12 weeks- +neuropathy\par Kidney disease: denies , small proteinuria(improved)\par Heart disease: denies\par \par Weight:has gained 13 lbs 2/2022\par Diet:\par B: 2 fried eggs and ham, 1/2 cup coffee with milk \par L: whole wheat toast with pb\par D: shrimp,  cocktail sauce \par S: sugar free jello/pudding, cool whip\par Drinking: water \par Exercise: was going for walks but now having difficulty with balance  \par Smoking: denies\par \par HLD\par Triglyceride 165, Total cholesterol 137, HDL 37, LDL 66\par Atorvastatin 20 mg daily\par \par HTN\par BP in office 128/80\par Lisinopril-HCTZ l 10-12.5 mg otto\par \par Vit B12 - normal on no supplement \par \par Vit D Def-28.3\par On no supplement

## 2023-06-27 RX ORDER — FLUCONAZOLE 150 MG/1
150 TABLET ORAL
Qty: 2 | Refills: 0 | Status: ACTIVE | COMMUNITY
Start: 2022-11-02 | End: 1900-01-01

## 2023-07-11 ENCOUNTER — NON-APPOINTMENT (OUTPATIENT)
Age: 58
End: 2023-07-11

## 2023-07-17 ENCOUNTER — RX RENEWAL (OUTPATIENT)
Age: 58
End: 2023-07-17

## 2023-09-23 ENCOUNTER — RX RENEWAL (OUTPATIENT)
Age: 58
End: 2023-09-23

## 2023-10-02 ENCOUNTER — RX RENEWAL (OUTPATIENT)
Age: 58
End: 2023-10-02

## 2023-10-30 ENCOUNTER — APPOINTMENT (OUTPATIENT)
Dept: ENDOCRINOLOGY | Facility: CLINIC | Age: 58
End: 2023-10-30

## 2023-12-05 ENCOUNTER — LABORATORY RESULT (OUTPATIENT)
Age: 58
End: 2023-12-05

## 2023-12-13 ENCOUNTER — APPOINTMENT (OUTPATIENT)
Dept: ENDOCRINOLOGY | Facility: CLINIC | Age: 58
End: 2023-12-13
Payer: COMMERCIAL

## 2023-12-13 PROCEDURE — 99214 OFFICE O/P EST MOD 30 MIN: CPT | Mod: 95

## 2023-12-13 RX ORDER — LISINOPRIL AND HYDROCHLOROTHIAZIDE TABLETS 10; 12.5 MG/1; MG/1
10-12.5 TABLET ORAL
Qty: 90 | Refills: 1 | Status: DISCONTINUED | COMMUNITY
Start: 2020-11-02 | End: 2023-12-13

## 2023-12-13 NOTE — HISTORY OF PRESENT ILLNESS
[Home] : at home, [unfilled] , at the time of the visit. [Medical Office: ([facility])___] : at the medical office located in  [Verbal consent obtained from patient] : the patient, [unfilled] [FreeTextEntry1] : Interval history: Pt is sick with viral infection- head cold  Pt endorses a lot of stress and that she can go all day without eating then binges at home late night . Still trying to make better choices.  T2DM Severity: uncontrolled  Onset: routine labs Duration: at least 10 years ago Modifying Factors: newly on insulin  SMBG Has been checking 1-2x a day On average fasting- 120s-150s Throughout the day- low to mid 100s   HGA1C 7.6 - improved  Current drug regimen MFN 1000 mg BID  Januvia 50 mg QD   Has not been taking glipizide  Has been off Basaglar since 5/2021 Has history of yeast infection and could not tolerate Ozempic  Eye exam: Has apt end of 2023-denies DR Foot exam: Dr. Guerrier office, goes every 10-12 weeks- +neuropathy Kidney disease: denies , moderate proteinuria (worse) (no longer on aceI) Heart disease: denies  Weight: relatively stable Diet: B: 2 fried eggs and ham, 1/2 cup coffee with milk  L: whole wheat toast with pb D: shrimp,  cocktail sauce  S: sugar free jello/pudding, cool whip Drinking: water  Exercise: alot of desk time Smoking: denies  HLD Triglyceride 186, Total cholesterol 162, HDL 43, LDL 87 Atorvastatin 20 mg daily  HTN Metoprolol 50 mg daily No longer on aceI  Vit B12 - normal on no supplement   Vit D Def-21 On supplement when she rememebrs

## 2023-12-13 NOTE — ASSESSMENT
[FreeTextEntry1] : T2DM  -Pt doing better but still has room for improvement   -For now, continue MFN 1000 mg BID (be compliant with both doses) and Januvia 50 mg daily.  -Begin Glipizide 2. 5 mg daily with dinner (was doing this for a few weeks and self d.c for unknown reason)  -Pt not interested in CDE, states she knows what she has to do and she just has to do it  -Increase exercise as tolerated, goal of 30 mins a day 5x a week  -Continue to follow up with all specialists including ophtho, podiatry    HTN  -May need to restart aceI due to proteinuria- pt to ask cardiologist if appropriate 2/2 already on BB   HLD  -Continue statin  Vit D Def  -Restart daily 1000 IU supplement    Vit B Def  -Ok to continue on no supplement   RTO 2 months

## 2024-03-05 ENCOUNTER — APPOINTMENT (OUTPATIENT)
Dept: ENDOCRINOLOGY | Facility: CLINIC | Age: 59
End: 2024-03-05

## 2024-03-11 ENCOUNTER — RX RENEWAL (OUTPATIENT)
Age: 59
End: 2024-03-11

## 2024-03-25 ENCOUNTER — RX RENEWAL (OUTPATIENT)
Age: 59
End: 2024-03-25

## 2024-04-17 LAB
25(OH)D3 SERPL-MCNC: 16.5 NG/ML
ALBUMIN SERPL ELPH-MCNC: 4.2 G/DL
ALP BLD-CCNC: 150 U/L
ALT SERPL-CCNC: 20 U/L
ANION GAP SERPL CALC-SCNC: 10 MMOL/L
AST SERPL-CCNC: 13 U/L
BASOPHILS # BLD AUTO: 0.06 K/UL
BASOPHILS NFR BLD AUTO: 0.6 %
BILIRUB SERPL-MCNC: 0.5 MG/DL
BUN SERPL-MCNC: 17 MG/DL
CALCIUM SERPL-MCNC: 9.2 MG/DL
CHLORIDE SERPL-SCNC: 106 MMOL/L
CHOLEST SERPL-MCNC: 168 MG/DL
CO2 SERPL-SCNC: 26 MMOL/L
CREAT SERPL-MCNC: 0.68 MG/DL
CREAT SPEC-SCNC: 100 MG/DL
EGFR: 100 ML/MIN/1.73M2
EOSINOPHIL # BLD AUTO: 0.15 K/UL
EOSINOPHIL NFR BLD AUTO: 1.5 %
ESTIMATED AVERAGE GLUCOSE: 189 MG/DL
GLUCOSE SERPL-MCNC: 207 MG/DL
HBA1C MFR BLD HPLC: 8.2 %
HCT VFR BLD CALC: 44.2 %
HDLC SERPL-MCNC: 43 MG/DL
HGB BLD-MCNC: 14.1 G/DL
IMM GRANULOCYTES NFR BLD AUTO: 1.3 %
LDLC SERPL CALC-MCNC: 90 MG/DL
LYMPHOCYTES # BLD AUTO: 2.56 K/UL
LYMPHOCYTES NFR BLD AUTO: 26.3 %
MAN DIFF?: NORMAL
MCHC RBC-ENTMCNC: 30.3 PG
MCHC RBC-ENTMCNC: 31.9 GM/DL
MCV RBC AUTO: 95.1 FL
MICROALBUMIN 24H UR DL<=1MG/L-MCNC: 24.1 MG/DL
MICROALBUMIN/CREAT 24H UR-RTO: 241 MG/G
MONOCYTES # BLD AUTO: 0.64 K/UL
MONOCYTES NFR BLD AUTO: 6.6 %
NEUTROPHILS # BLD AUTO: 6.18 K/UL
NEUTROPHILS NFR BLD AUTO: 63.7 %
NONHDLC SERPL-MCNC: 126 MG/DL
PLATELET # BLD AUTO: 224 K/UL
POTASSIUM SERPL-SCNC: 5 MMOL/L
PROT SERPL-MCNC: 6.9 G/DL
RBC # BLD: 4.65 M/UL
RBC # FLD: 13 %
SODIUM SERPL-SCNC: 142 MMOL/L
T3FREE SERPL-MCNC: 2.93 PG/ML
T4 FREE SERPL-MCNC: 1.4 NG/DL
TRIGL SERPL-MCNC: 212 MG/DL
TSH SERPL-ACNC: 0.69 UIU/ML
VIT B12 SERPL-MCNC: 348 PG/ML
WBC # FLD AUTO: 9.72 K/UL

## 2024-04-24 ENCOUNTER — APPOINTMENT (OUTPATIENT)
Dept: ENDOCRINOLOGY | Facility: CLINIC | Age: 59
End: 2024-04-24
Payer: COMMERCIAL

## 2024-04-24 DIAGNOSIS — E53.8 DEFICIENCY OF OTHER SPECIFIED B GROUP VITAMINS: ICD-10-CM

## 2024-04-24 DIAGNOSIS — R79.89 OTHER SPECIFIED ABNORMAL FINDINGS OF BLOOD CHEMISTRY: ICD-10-CM

## 2024-04-24 DIAGNOSIS — E11.40 TYPE 2 DIABETES MELLITUS WITH DIABETIC NEUROPATHY, UNSPECIFIED: ICD-10-CM

## 2024-04-24 DIAGNOSIS — I10 ESSENTIAL (PRIMARY) HYPERTENSION: ICD-10-CM

## 2024-04-24 DIAGNOSIS — E78.5 HYPERLIPIDEMIA, UNSPECIFIED: ICD-10-CM

## 2024-04-24 PROCEDURE — G2211 COMPLEX E/M VISIT ADD ON: CPT

## 2024-04-24 PROCEDURE — 99214 OFFICE O/P EST MOD 30 MIN: CPT

## 2024-04-24 RX ORDER — GLIPIZIDE 2.5 MG/1
2.5 TABLET, FILM COATED, EXTENDED RELEASE ORAL TWICE DAILY
Qty: 180 | Refills: 0 | Status: ACTIVE | COMMUNITY
Start: 2021-11-16 | End: 1900-01-01

## 2024-04-24 RX ORDER — BLOOD SUGAR DIAGNOSTIC
STRIP MISCELLANEOUS
Qty: 400 | Refills: 1 | Status: ACTIVE | COMMUNITY
Start: 2020-10-12 | End: 1900-01-01

## 2024-04-24 RX ORDER — SITAGLIPTIN 50 MG/1
50 TABLET, FILM COATED ORAL
Qty: 90 | Refills: 1 | Status: ACTIVE | COMMUNITY
Start: 2021-05-20 | End: 1900-01-01

## 2024-04-24 RX ORDER — LANCETS
EACH MISCELLANEOUS
Qty: 400 | Refills: 1 | Status: ACTIVE | COMMUNITY
Start: 2020-11-02 | End: 1900-01-01

## 2024-04-24 NOTE — HISTORY OF PRESENT ILLNESS
[Home] : at home, [unfilled] , at the time of the visit. [Medical Office: ([facility])___] : at the medical office located in  [Verbal consent obtained from patient] : the patient, [unfilled] [FreeTextEntry1] : Interval history: Pt is sick with a IBS flare up   Pt endorses a lot of stress and that she can go all day without eating then binges at home late night . Still trying to make better choices.  T2DM Severity: uncontrolled  Onset: routine labs Duration: at least 10 years ago Modifying Factors: newly on insulin  SMBG Has been checking 1-2x a day On average fasting- mid to high 100s  Throughout the day- mid 100s   HGA1C 8.2- 4/2024- worse   Current drug regimen MFN 1000 mg BID (takes 2/7 weeks...always takes at least 2 ) Januvia 50 mg QD (takes 7/7 days of the week)  Glipizide 2.5 mg daily with dinner (takes 7/7 days a week)   Has been off Basaglar since 5/2021 Has history of yeast infection and could not tolerate Ozempic  Eye exam: Had apt end of 2023-denies DR Foot exam: Dr. Guerrier office, goes every 10-12 weeks- +neuropathy Kidney disease: denies , moderate proteinuria (worse) (no longer on aceI) Heart disease: denies  Weight: relatively stable Diet: not a bfast or lunch eater, eats large meals at night  Drinking: water  Exercise: alot of desk time Smoking: denies  HLD Triglyceride 212, Total cholesterol 168,, HDL 43, LDL 90 Atorvastatin 20 mg daily  HTN No BP due to TEB  Metoprolol 50 mg daily No longer on aceI  Vit B12 - normal on no supplement   Vit D Def- 16.5 On supplement when she rememebrs

## 2024-04-24 NOTE — ASSESSMENT
[FreeTextEntry1] : Pt should follow up [facility]- appears her IBS flares up frequently effecting her quality of life.  T2DM  -Pt DM control has room for improvement.   -For now, continue MFN 1000 mg BID (be compliant with both doses) and Januvia 50 mg daily.  -Increase Glipizide 2. 5 mg to BID  -Pt not interested in CDE, states she knows what she has to do and she just has to do it  -Increase exercise as tolerated, goal of 30 mins a day 5x a week  -Continue to follow up with all specialists including ophtho, podiatry    HTN  -May need to restart aceI due to proteinuria- pt to ask cardiologist if appropriate 2/2 already on BB   HLD  -Continue statin   Vit D Def  -Restart daily 1000 IU supplement    Vit B Def  -Ok to continue on no supplement   RTO 3 months, labs before next visit

## 2024-04-24 NOTE — REVIEW OF SYSTEMS
[Fatigue] : no fatigue [Recent Weight Gain (___ Lbs)] : no recent weight gain [Recent Weight Loss (___ Lbs)] : no recent weight loss [Visual Field Defect] : no visual field defect [Blurred Vision] : no blurred vision [Dysphagia] : no dysphagia [Neck Pain] : no neck pain [Dysphonia] : no dysphonia [Chest Pain] : no chest pain [Shortness Of Breath] : no shortness of breath [Diarrhea] : diarrhea [Polyuria] : no polyuria [Polydipsia] : no polydipsia [FreeTextEntry7] : ibs flare up frequently

## 2024-04-25 ENCOUNTER — TRANSCRIPTION ENCOUNTER (OUTPATIENT)
Age: 59
End: 2024-04-25

## 2024-05-28 ENCOUNTER — NON-APPOINTMENT (OUTPATIENT)
Age: 59
End: 2024-05-28

## 2024-07-25 ENCOUNTER — RX RENEWAL (OUTPATIENT)
Age: 59
End: 2024-07-25

## 2024-08-16 NOTE — PATIENT PROFILE ADULT - NSPROPTRIGHTNOTIFYOBTAINDET_GEN_A_NUR
Pt has been informed and has an up coming appointment   
Pt returning call from office about test results.  Pt also stated she would like to get her iron checked.      Please call pt  
pt intubated and sedated

## 2024-10-31 ENCOUNTER — RX RENEWAL (OUTPATIENT)
Age: 59
End: 2024-10-31

## 2024-11-01 ENCOUNTER — RX RENEWAL (OUTPATIENT)
Age: 59
End: 2024-11-01

## 2024-12-27 ENCOUNTER — RX RENEWAL (OUTPATIENT)
Age: 59
End: 2024-12-27

## 2025-01-23 ENCOUNTER — NON-APPOINTMENT (OUTPATIENT)
Age: 60
End: 2025-01-23

## 2025-01-23 ENCOUNTER — APPOINTMENT (OUTPATIENT)
Dept: ENDOCRINOLOGY | Facility: CLINIC | Age: 60
End: 2025-01-23
Payer: COMMERCIAL

## 2025-01-23 VITALS
HEART RATE: 90 BPM | WEIGHT: 240 LBS | OXYGEN SATURATION: 96 % | SYSTOLIC BLOOD PRESSURE: 148 MMHG | BODY MASS INDEX: 37.67 KG/M2 | DIASTOLIC BLOOD PRESSURE: 70 MMHG | HEIGHT: 67 IN

## 2025-01-23 DIAGNOSIS — E11.40 TYPE 2 DIABETES MELLITUS WITH DIABETIC NEUROPATHY, UNSPECIFIED: ICD-10-CM

## 2025-01-23 DIAGNOSIS — E53.8 DEFICIENCY OF OTHER SPECIFIED B GROUP VITAMINS: ICD-10-CM

## 2025-01-23 DIAGNOSIS — R79.89 OTHER SPECIFIED ABNORMAL FINDINGS OF BLOOD CHEMISTRY: ICD-10-CM

## 2025-01-23 DIAGNOSIS — E78.5 HYPERLIPIDEMIA, UNSPECIFIED: ICD-10-CM

## 2025-01-23 PROCEDURE — 99214 OFFICE O/P EST MOD 30 MIN: CPT

## 2025-01-23 PROCEDURE — G2211 COMPLEX E/M VISIT ADD ON: CPT | Mod: NC

## 2025-01-23 RX ORDER — ERGOCALCIFEROL 1.25 MG/1
1.25 MG CAPSULE ORAL
Qty: 12 | Refills: 1 | Status: ACTIVE | COMMUNITY
Start: 2025-01-23 | End: 1900-01-01

## 2025-01-23 RX ORDER — TIRZEPATIDE 2.5 MG/.5ML
2.5 INJECTION, SOLUTION SUBCUTANEOUS
Qty: 1 | Refills: 0 | Status: ACTIVE | COMMUNITY
Start: 2025-01-23 | End: 1900-01-01

## 2025-02-08 NOTE — ED ADULT NURSE REASSESSMENT NOTE - NURSING GU BLADDER
Surgery Focus Note:     Wound vac taken down at bedside due to high VAC output. No active arterial bleeding noted. No significant oozing. Tissue appeared healthy. Wound re-packed with two sheets of QuickClot and multiple ABD pads. No further VAC therapy at this time. Will continue to closely monitor in the SICU    Chaka Alford MD, Physician [facility]   [phone]   non-tender/non-distended

## 2025-02-13 NOTE — ED ADULT TRIAGE NOTE - BMI (KG/M2)
36
Detail Level: Simple
Additional Notes: Consent was obtained with risks, benefits, and alternatives discussed for the documented procedures. Photographs were taken. Preoperative medications were taken as above. This therapy was medically necessary because the patient has failed a 3-month trial of conservative therapy (such as: exercise, periodic leg elevation, weight loss, compressive therapy and avoidance of prolonged immobility) and no evidence of aneurysm in the target segment. \\n\\nThe patient was seen today for ultrasound-guided micro foam chemical ablation with Varithena (polidocanol foam) 1%. The procedure was explained in depth to the patient and informed consent was signed. Alternative treatment options discussed. Questions answered. The patient voiced understanding of the procedure and potential complications including but not limited to infection, bleeding, DVT, pulmonary embolism, stroke, skin discoloration, ulcer, arterial injury paresthesia’s, anaphylactic reaction. \\n\\nThe patient was prepped and draped in the usual sterile fashion. The targeted vessel was identified using ultrasound guidance. The vein (s) were cannulated using a scalp vein set 25G x 3/4. Blood return was verified. The leg was elevated for 5 minutes. Using aseptic technique, Varithena was withdrawn from the drug canister using the Varithena Transfer Unit to a sterile syringe. Under ultrasound guidance, Varithena was injected into the cannulated vein(s) and the foam was followed through the veins with ultrasound visualization. Compression was applied at the junction while foam was injected. Venospam of the treated vein was confirmed using ultrasound. Total volume of Varithena used was 2.5 mL,. No foam was identified entering the deep venous system. Direct pressure was held at the puncture site manually and hemostasis was obtained. The area was cleansed with alcohol and dried thoroughly. The leg(s) was elevated with patient remaining on the table for 10-15 minutes to observe for anaphylactic reaction without incident. A 20-30 mmHg compression stocking was placed on patient. The leg(s) was lowered only after compression had been applied and the patient was immediately ambulatory. Patient tolerated the procedure well and left the operating room ambulating in stable condition without apparent concerns at time of release. \\n\\nMicro-Phlebectomy alleviates refractory painful high venous pressure by removing portions of the incompetent vein. This procedure is to be done on varicose veins which are nonresponsive to Endovenous Laser Treatment (EVLT) or Ultrasound Guided Sclerotherapy (USGS). This is necessary to treat vessels that are involved in transmission of high venous pressure, which can cause the patient to continue to have pain and the veins may clot. \\n\\nThe procedure was explained in depth. Alternative treatment options were discussed. Potential complications including but, not limited to infections, bleeding, DVT, pulmonary embolism, skin burns, discoloration, nerve injury, arterial injury, ulcers, and paresthesia’s were discussed. Questions were sought and answered. Patient expressed understanding. Patient agreed to proceed. Venous branches were marked on the leg while the patient was in a standing position using a surgical marker.  Area is prepped with hibiclens soap. Sterile drapes placed. Tumescent solution (450 ml 0.9 NS, 50 ml lidocaine 1% with epinephrine and 16 ml of Sodium Bicarbonate) was injected sub-dermally 2 inches around the marked areas.  Small incisions were made at the previously marked veins using a #11 blade. Small incisions were made at the previously marked veins using a #11 blade. A micro-phlebectomy hook was then used to avulse the veins.\\n\\nLeg washed with antibacterial soap & water, dried, applied Dermaka cream to treated areas, compression foam pads, ABD pads, kerlix, coban, ACE wraps, tape, & secured with safety pins. Patient tolerated the procedure well and left the operating room ambulating in stable condition. Post-operative instructions given and reviewed with patient verbally and in writing. Instructed to take Ibuprofen 600mg every 4-6 hours or Aleve 500mg every 12 hours or Tylenol 500mg every 6 hours for one week with food, walk for 10 minutes every hour the patient is awake. Patient to remove dressings in 24/48 hours and wear thigh high compression stockings for 7 days if tolerated. No heavy lifting over 25 lbs and no strenuous exercising. The patient was given written instructions with a phone number to contact with any issues or concerns. Patient verbalized understanding of all instructions. Questions encouraged and answered. Patient is to return to clinic in 5-7 days for follow-up post treatment ultrasound.
Render Risk Assessment In Note?: no

## 2025-02-24 NOTE — PATIENT PROFILE ADULT - FUNCTIONAL SCREEN CURRENT LEVEL: COMMUNICATION, MLM
Keep appt w/ me on 2/25/2025. Esmer Duckworth PA-C   0 = understands/communicates without difficulty

## 2025-03-20 ENCOUNTER — APPOINTMENT (OUTPATIENT)
Dept: ENDOCRINOLOGY | Facility: CLINIC | Age: 60
End: 2025-03-20
Payer: COMMERCIAL

## 2025-03-20 VITALS
OXYGEN SATURATION: 98 % | HEIGHT: 67 IN | SYSTOLIC BLOOD PRESSURE: 159 MMHG | HEART RATE: 100 BPM | BODY MASS INDEX: 35.16 KG/M2 | WEIGHT: 224 LBS | DIASTOLIC BLOOD PRESSURE: 72 MMHG

## 2025-03-20 DIAGNOSIS — R79.89 OTHER SPECIFIED ABNORMAL FINDINGS OF BLOOD CHEMISTRY: ICD-10-CM

## 2025-03-20 DIAGNOSIS — I10 ESSENTIAL (PRIMARY) HYPERTENSION: ICD-10-CM

## 2025-03-20 DIAGNOSIS — E53.8 DEFICIENCY OF OTHER SPECIFIED B GROUP VITAMINS: ICD-10-CM

## 2025-03-20 DIAGNOSIS — E11.40 TYPE 2 DIABETES MELLITUS WITH DIABETIC NEUROPATHY, UNSPECIFIED: ICD-10-CM

## 2025-03-20 DIAGNOSIS — E78.5 HYPERLIPIDEMIA, UNSPECIFIED: ICD-10-CM

## 2025-03-20 LAB — GLUCOSE BLDC GLUCOMTR-MCNC: 146

## 2025-03-20 PROCEDURE — 82962 GLUCOSE BLOOD TEST: CPT

## 2025-03-20 PROCEDURE — 99214 OFFICE O/P EST MOD 30 MIN: CPT

## 2025-03-20 PROCEDURE — G2211 COMPLEX E/M VISIT ADD ON: CPT | Mod: NC

## 2025-03-20 RX ORDER — LISINOPRIL 30 MG/1
TABLET ORAL
Refills: 0 | Status: ACTIVE | COMMUNITY

## 2025-03-26 ENCOUNTER — RX RENEWAL (OUTPATIENT)
Age: 60
End: 2025-03-26

## 2025-05-08 NOTE — ED ADULT NURSE NOTE - EXTENSIONS OF SELF_ADULT
Is This A New Presentation, Or A Follow-Up?: Rash Additional History: -pt went to urgent care and was prescribed prednisone (completed today). Pt was told by urgent care rash was caused by poison ivy \\n-very itchy\\n-currently using calamine lotion and taking allegra and ibuprofen None

## 2025-05-09 ENCOUNTER — RX RENEWAL (OUTPATIENT)
Age: 60
End: 2025-05-09

## 2025-06-12 ENCOUNTER — APPOINTMENT (OUTPATIENT)
Dept: ENDOCRINOLOGY | Facility: CLINIC | Age: 60
End: 2025-06-12
Payer: COMMERCIAL

## 2025-06-12 VITALS
DIASTOLIC BLOOD PRESSURE: 80 MMHG | BODY MASS INDEX: 35.47 KG/M2 | WEIGHT: 226 LBS | OXYGEN SATURATION: 98 % | HEIGHT: 67 IN | SYSTOLIC BLOOD PRESSURE: 140 MMHG | HEART RATE: 105 BPM

## 2025-06-12 LAB — GLUCOSE BLDC GLUCOMTR-MCNC: 237

## 2025-06-12 PROCEDURE — G2211 COMPLEX E/M VISIT ADD ON: CPT | Mod: NC

## 2025-06-12 PROCEDURE — 99214 OFFICE O/P EST MOD 30 MIN: CPT

## 2025-06-12 PROCEDURE — 82962 GLUCOSE BLOOD TEST: CPT

## 2025-06-12 RX ORDER — METFORMIN HYDROCHLORIDE 500 MG/1
500 TABLET, COATED ORAL
Refills: 0 | Status: ACTIVE | COMMUNITY

## 2025-06-18 NOTE — ED ADULT NURSE REASSESSMENT NOTE - URINE CHARACTERISTICS
DISPLAY PLAN FREE TEXT DISPLAY PLAN FREE TEXT DISPLAY PLAN FREE TEXT DISPLAY PLAN FREE TEXT DISPLAY PLAN FREE TEXT DISPLAY PLAN FREE TEXT DISPLAY PLAN FREE TEXT DISPLAY PLAN FREE TEXT DISPLAY PLAN FREE TEXT DISPLAY PLAN FREE TEXT DISPLAY PLAN FREE TEXT DISPLAY PLAN FREE TEXT DISPLAY PLAN FREE TEXT DISPLAY PLAN FREE TEXT DISPLAY PLAN FREE TEXT DISPLAY PLAN FREE TEXT DISPLAY PLAN FREE TEXT DISPLAY PLAN FREE TEXT clear/byrnes catheter in place

## 2025-09-18 ENCOUNTER — APPOINTMENT (OUTPATIENT)
Dept: ENDOCRINOLOGY | Facility: CLINIC | Age: 60
End: 2025-09-18
Payer: COMMERCIAL

## 2025-09-18 VITALS
HEART RATE: 103 BPM | DIASTOLIC BLOOD PRESSURE: 90 MMHG | BODY MASS INDEX: 35.47 KG/M2 | HEIGHT: 67 IN | WEIGHT: 226 LBS | OXYGEN SATURATION: 98 % | SYSTOLIC BLOOD PRESSURE: 150 MMHG

## 2025-09-18 DIAGNOSIS — E53.8 DEFICIENCY OF OTHER SPECIFIED B GROUP VITAMINS: ICD-10-CM

## 2025-09-18 DIAGNOSIS — E78.5 HYPERLIPIDEMIA, UNSPECIFIED: ICD-10-CM

## 2025-09-18 DIAGNOSIS — R79.89 OTHER SPECIFIED ABNORMAL FINDINGS OF BLOOD CHEMISTRY: ICD-10-CM

## 2025-09-18 DIAGNOSIS — I10 ESSENTIAL (PRIMARY) HYPERTENSION: ICD-10-CM

## 2025-09-18 DIAGNOSIS — E11.40 TYPE 2 DIABETES MELLITUS WITH DIABETIC NEUROPATHY, UNSPECIFIED: ICD-10-CM

## 2025-09-18 LAB — GLUCOSE BLDC GLUCOMTR-MCNC: 236

## 2025-09-18 PROCEDURE — 99214 OFFICE O/P EST MOD 30 MIN: CPT

## 2025-09-18 PROCEDURE — 82962 GLUCOSE BLOOD TEST: CPT

## 2025-09-18 PROCEDURE — G2211 COMPLEX E/M VISIT ADD ON: CPT | Mod: NC

## 2025-09-18 RX ORDER — BLOOD-GLUCOSE METER
W/DEVICE EACH MISCELLANEOUS
Qty: 1 | Refills: 0 | Status: ACTIVE | COMMUNITY
Start: 2025-09-18 | End: 1900-01-01

## 2025-09-18 RX ORDER — OLMESARTAN MEDOXOMIL 40 MG/1
TABLET, FILM COATED ORAL
Refills: 0 | Status: ACTIVE | COMMUNITY

## 2025-09-18 RX ORDER — TORSEMIDE 5 MG/1
TABLET ORAL
Refills: 0 | Status: ACTIVE | COMMUNITY

## 2025-09-25 RX ORDER — SITAGLIPTIN 50 MG/1
50 TABLET, FILM COATED ORAL
Qty: 90 | Refills: 1 | Status: ACTIVE | COMMUNITY
Start: 2025-09-18 | End: 1900-01-01